# Patient Record
Sex: MALE | Race: WHITE | Employment: OTHER | URBAN - METROPOLITAN AREA
[De-identification: names, ages, dates, MRNs, and addresses within clinical notes are randomized per-mention and may not be internally consistent; named-entity substitution may affect disease eponyms.]

---

## 2017-12-06 ENCOUNTER — APPOINTMENT (OUTPATIENT)
Dept: GENERAL RADIOLOGY | Age: 47
End: 2017-12-06
Payer: COMMERCIAL

## 2017-12-06 ENCOUNTER — HOSPITAL ENCOUNTER (EMERGENCY)
Age: 47
Discharge: HOME | End: 2017-12-06
Attending: EMERGENCY MEDICINE
Payer: COMMERCIAL

## 2017-12-06 VITALS
SYSTOLIC BLOOD PRESSURE: 154 MMHG | WEIGHT: 281.2 LBS | BODY MASS INDEX: 37.27 KG/M2 | HEART RATE: 105 BPM | OXYGEN SATURATION: 95 % | DIASTOLIC BLOOD PRESSURE: 100 MMHG | RESPIRATION RATE: 20 BRPM | TEMPERATURE: 97.2 F | HEIGHT: 73 IN

## 2017-12-06 DIAGNOSIS — M77.9 TENDONITIS: Primary | ICD-10-CM

## 2017-12-06 LAB
ABSOLUTE EOS #: 0.2 K/UL (ref 0–0.4)
ABSOLUTE IMMATURE GRANULOCYTE: ABNORMAL K/UL (ref 0–0.3)
ABSOLUTE LYMPH #: 1.5 K/UL (ref 1–4.8)
ABSOLUTE MONO #: 0.8 K/UL (ref 0.2–0.8)
BASOPHILS # BLD: 2 % (ref 0–2)
BASOPHILS ABSOLUTE: 0.1 K/UL (ref 0–0.2)
DIFFERENTIAL TYPE: ABNORMAL
EOSINOPHILS RELATIVE PERCENT: 2 % (ref 1–4)
HCT VFR BLD CALC: 49.4 % (ref 41–53)
HEMOGLOBIN: 16.4 G/DL (ref 13.5–17.5)
IMMATURE GRANULOCYTES: ABNORMAL %
LYMPHOCYTES # BLD: 19 % (ref 24–44)
MCH RBC QN AUTO: 28.9 PG (ref 26–34)
MCHC RBC AUTO-ENTMCNC: 33.2 G/DL (ref 31–37)
MCV RBC AUTO: 87 FL (ref 80–100)
MONOCYTES # BLD: 10 % (ref 1–7)
PDW BLD-RTO: 13.1 % (ref 11.5–14.5)
PLATELET # BLD: 248 K/UL (ref 130–400)
PLATELET ESTIMATE: ABNORMAL
PMV BLD AUTO: 8.3 FL (ref 6–12)
RBC # BLD: 5.68 M/UL (ref 4.5–5.9)
RBC # BLD: ABNORMAL 10*6/UL
SEG NEUTROPHILS: 67 % (ref 36–66)
SEGMENTED NEUTROPHILS ABSOLUTE COUNT: 5.6 K/UL (ref 1.8–7.7)
URIC ACID: 7 MG/DL (ref 3.4–7)
WBC # BLD: 8.3 K/UL (ref 3.5–11)
WBC # BLD: ABNORMAL 10*3/UL

## 2017-12-06 PROCEDURE — 73630 X-RAY EXAM OF FOOT: CPT

## 2017-12-06 PROCEDURE — 84550 ASSAY OF BLOOD/URIC ACID: CPT

## 2017-12-06 PROCEDURE — 85025 COMPLETE CBC W/AUTO DIFF WBC: CPT

## 2017-12-06 PROCEDURE — 99283 EMERGENCY DEPT VISIT LOW MDM: CPT

## 2017-12-06 RX ORDER — IBUPROFEN 800 MG/1
800 TABLET ORAL EVERY 8 HOURS PRN
Qty: 30 TABLET | Refills: 0 | Status: SHIPPED | OUTPATIENT
Start: 2017-12-06 | End: 2018-10-18

## 2017-12-06 RX ORDER — HYDROCODONE BITARTRATE AND ACETAMINOPHEN 5; 325 MG/1; MG/1
1 TABLET ORAL EVERY 6 HOURS PRN
Qty: 10 TABLET | Refills: 0 | Status: SHIPPED | OUTPATIENT
Start: 2017-12-06 | End: 2017-12-13

## 2017-12-06 ASSESSMENT — PAIN DESCRIPTION - FREQUENCY: FREQUENCY: CONTINUOUS

## 2017-12-06 ASSESSMENT — PAIN DESCRIPTION - ORIENTATION: ORIENTATION: RIGHT

## 2017-12-06 ASSESSMENT — PAIN DESCRIPTION - LOCATION: LOCATION: FOOT

## 2017-12-06 ASSESSMENT — PAIN DESCRIPTION - DESCRIPTORS: DESCRIPTORS: THROBBING

## 2017-12-06 ASSESSMENT — PAIN SCALES - GENERAL: PAINLEVEL_OUTOF10: 10

## 2017-12-06 ASSESSMENT — PAIN SCALES - WONG BAKER: WONGBAKER_NUMERICALRESPONSE: 10

## 2017-12-07 ENCOUNTER — OFFICE VISIT (OUTPATIENT)
Dept: PODIATRY | Age: 47
End: 2017-12-07
Payer: COMMERCIAL

## 2017-12-07 VITALS — BODY MASS INDEX: 37.24 KG/M2 | HEIGHT: 73 IN | WEIGHT: 281 LBS

## 2017-12-07 DIAGNOSIS — M25.571 SINUS TARSI SYNDROME OF RIGHT FOOT: Primary | ICD-10-CM

## 2017-12-07 PROBLEM — G47.33 OBSTRUCTIVE SLEEP APNEA SYNDROME: Status: ACTIVE | Noted: 2017-06-09

## 2017-12-07 PROBLEM — M54.16 LUMBAR RADICULOPATHY: Status: ACTIVE | Noted: 2017-06-09

## 2017-12-07 PROBLEM — G25.81 RESTLESS LEGS SYNDROME: Status: ACTIVE | Noted: 2017-06-09

## 2017-12-07 PROBLEM — E11.9 DIABETES MELLITUS (HCC): Status: ACTIVE | Noted: 2017-06-09

## 2017-12-07 PROBLEM — I10 HYPERTENSION: Status: ACTIVE | Noted: 2017-06-09

## 2017-12-07 PROBLEM — E78.5 HYPERLIPIDEMIA: Status: ACTIVE | Noted: 2017-06-09

## 2017-12-07 PROCEDURE — 99203 OFFICE O/P NEW LOW 30 MIN: CPT | Performed by: PODIATRIST

## 2017-12-07 RX ORDER — ROPINIROLE 2 MG/1
2 TABLET, FILM COATED ORAL
COMMUNITY
Start: 2017-06-09 | End: 2018-10-18 | Stop reason: ALTCHOICE

## 2017-12-07 RX ORDER — ROSUVASTATIN CALCIUM 40 MG/1
40 TABLET, COATED ORAL
COMMUNITY
Start: 2017-06-09 | End: 2018-10-18

## 2017-12-07 RX ORDER — PREDNISONE 10 MG/1
TABLET ORAL
Qty: 20 TABLET | Refills: 0 | Status: SHIPPED | OUTPATIENT
Start: 2017-12-07 | End: 2018-10-18

## 2017-12-07 RX ORDER — HYDROCHLOROTHIAZIDE 25 MG/1
25 TABLET ORAL
COMMUNITY
Start: 2017-06-09 | End: 2018-10-18

## 2017-12-07 NOTE — PROGRESS NOTES
[facility]  New Patient History and Physical    Chief Complaint   Patient presents with    New Patient    Foot Pain     Calcium build up- Right Foot    Nail Problem     [place]         HPI: hCepe Turpin is a 49 y.o. male who presents to the office today complaining of possible calcium build and pain in bilateral feet. Symptoms began several month(s) ago. Patient relates pain is Present. Pain is rated 7 out of 10 and is described as constant, moderate, severe. Treatments prior to today's visit include: yes, previous seen [facility] ED. Currently denies F/C/N/V. Allergies   Allergen Reactions    Codeine     Ibuprofen        Past Medical History:   Diagnosis Date    Hyperlipidemia     Hypertension     Kidney calculi     Lumbar radiculopathy 6/9/2017    Obstructive sleep apnea syndrome 6/9/2017    Restless legs syndrome 6/9/2017       Prior to Admission medications    Medication Sig Start Date End Date Taking? Authorizing Provider   hydrochlorothiazide (HYDRODIURIL) 25 MG tablet Take 25 mg by mouth 6/9/17  Yes Historical Provider, MD   rosuvastatin (CRESTOR) 40 MG tablet Take 40 mg by mouth 6/9/17  Yes Historical Provider, MD   rOPINIRole (REQUIP) 2 MG tablet Take 2 mg by mouth 6/9/17  Yes Historical Provider, MD   ketorolac (TORADOL) 10 MG tablet Take 1 tablet by mouth every 6 hours as needed for Pain 7/10/15  Yes aRy Goodrich DO   ibuprofen (ADVIL;MOTRIN) 800 MG tablet Take 1 tablet by mouth every 8 hours as needed for Pain 12/6/17   Sarthak Neely CNP   HYDROcodone-acetaminophen (NORCO) 5-325 MG per tablet Take 1 tablet by mouth every 6 hours as needed for Pain . 12/6/17 12/13/17  Sarthak Neely CNP   tamsulosin (FLOMAX) 0.4 MG CAPS Take 0.4 mg by mouth daily for 5 doses 7/10/15 7/15/15  Donna Mcdonald DO       Past Surgical History:   Procedure Laterality Date    ANKLE SURGERY Right     SHOULDER SURGERY Right        History reviewed. No pertinent family history.     Social History Substance Use Topics    Smoking status: Former Smoker    Smokeless tobacco: Never Used    Alcohol use Yes      Comment: occasionally       Review of Systems    Lower Extremity Physical Examination:     Vitals: There were no vitals filed for this visit. General: AAO x 3 in NAD. Dermatologic Exam:  Skin lesion/ulceration Absent . Skin No rashes or nodules noted. .       Musculoskeletal:     1st MPJ ROM decreased, Bilateral.  Muscle strength 5/5, Bilateral.  Pain present upon palpation of left and right sinus tarsi area with pain elicited with eversion. .  Medial longitudinal arch, Bilateral WNL. Ankle ROM WNL,Bilateral.    Dorsally contracted digits absent digits 1-5 Bilateral.     Vascular: DP and PT pulses palpable 2/4, Bilateral.  CFT <3 seconds, Bilateral.  Hair growth present to the level of the digits, Bilateral.  Edema absent, Bilateral.  Varicosities absent, Bilateral. Erythema absent, Bilateral    Neurological: Sensation intact to light touch to level of digits, Bilateral.  Protective sensation intact 10/10 sites via 5.07/10g [place] Monofilament, Bilateral.  negative Tinel's, Bilateral.  negative Valleix sign, Bilateral.      Integument: Warm, dry, supple, Bilateral.  Open lesion absent, Bilateral.  Interdigital maceration absent to web spaces 1-4, Bilateral.  Nails are normal in length, thickness and color 1-5 bilateral.  Fissures absent, Bilateral.           Asessment: Patient is a 49 y.o. male with:   1. Sinus tarsi syndrome of right foot         Plan: Patient examined and evaluated. Current condition and treatment options discussed in detail. Discussed conservative and surgical options with the patient. rx given for prednisone to be tapered daily. Advised pt to obtain OTC orthotics and pt is understanding of that. Verbal and written instructions given to patient. Contact office with any questions/problems/concerns. RTC in 2week(s).     12/7/2017      Electronically signed by

## 2017-12-07 NOTE — ED PROVIDER NOTES
Attending Supervisory Note/Shared Visit   I have personally performed a face to face diagnostic evaluation on this patient. I have reviewed the mid-levels findings and agree.   History and Exam by me shows Foot pain      (Please note that portions of this note were completed with a voice recognition program.  Efforts were made to edit the dictations but occasionally words are mis-transcribed.)    Phuong Gonzalez MD  Attending Emergency Physician        Phuong Gonzalez MD  12/06/17 2173

## 2017-12-08 ASSESSMENT — ENCOUNTER SYMPTOMS
DIARRHEA: 0
CONSTIPATION: 0
COUGH: 0
SINUS PRESSURE: 0
SHORTNESS OF BREATH: 0
WHEEZING: 0
ABDOMINAL PAIN: 0
RHINORRHEA: 0
VOMITING: 0
COLOR CHANGE: 0
SORE THROAT: 0
NAUSEA: 0

## 2017-12-08 NOTE — ED PROVIDER NOTES
[address] [facility] ED  eMERGENCY dEPARTMENT eNCOUnter      Pt Name: Darin Sinha  MRN: [id number]  [place] 1970  Date of evaluation: 12/6/2017  Provider: Abiel Romero NP, Rehan 7631       Chief Complaint   Patient presents with    Foot Swelling     right    Foot Pain     right         HISTORY OF PRESENT ILLNESS  (Location/Symptom, Timing/Onset, Context/Setting, Quality, Duration, Modifying Factors, Severity.)   Darin Sinha is a 49 y.o. male who presents to the emergency department Today by private vehicle for evaluation of pain and swelling to the right lateral foot. Patient states that the last month he's been having pain and swelling to the right lateral foot. The pain has gotten Progressively worse. The pain is worse when he tries to put weight on it. He rates the pain a 10 on a 0-10 scale. He denies any known injury, fall, or trauma. Nursing Notes were reviewed. ALLERGIES     Codeine and Ibuprofen    CURRENT MEDICATIONS       Discharge Medication List as of 12/6/2017 10:19 PM      CONTINUE these medications which have NOT CHANGED    Details   tamsulosin (FLOMAX) 0.4 MG CAPS Take 0.4 mg by mouth daily for 5 doses, Disp-5 capsule, R-0      ketorolac (TORADOL) 10 MG tablet Take 1 tablet by mouth every 6 hours as needed for Pain, Disp-20 tablet, R-3             PAST MEDICAL HISTORY         Diagnosis Date    Hyperlipidemia     Hypertension     Kidney calculi     Lumbar radiculopathy 6/9/2017    Obstructive sleep apnea syndrome 6/9/2017    Restless legs syndrome 6/9/2017       SURGICAL HISTORY           Procedure Laterality Date    ANKLE SURGERY Right     SHOULDER SURGERY Right          FAMILY HISTORY     History reviewed. No pertinent family history. No family status information on file. SOCIAL HISTORY      reports that he has quit smoking. He has never used smokeless tobacco. He reports that he drinks alcohol. He reports that he does not use drugs.     REVIEW OF SYSTEMS    (2-9 systems for level 4, 10 or more for level 5)     Review of Systems   Constitutional: Negative for chills, fever and unexpected weight change. HENT: Negative for congestion, rhinorrhea, sinus pressure and sore throat. Respiratory: Negative for cough, shortness of breath and wheezing. Cardiovascular: Negative for chest pain and palpitations. Gastrointestinal: Negative for abdominal pain, constipation, diarrhea, nausea and vomiting. Genitourinary: Negative for dysuria and hematuria. Musculoskeletal: Negative for arthralgias and myalgias. Right foot pain   Skin: Negative for color change and rash. Neurological: Negative for dizziness, weakness and headaches. Hematological: Negative for adenopathy. Except as noted above the remainder of the review of systems was reviewed and negative. PHYSICAL EXAM    (up to 7 for level 4, 8 or more for level 5)     ED Triage Vitals [12/06/17 2120]   BP Temp Temp Source Pulse Resp SpO2 Height Weight   (!) 154/100 97.2 °F (36.2 °C) Oral 121 20 95 % 6' 1\" (1.854 m) 281 lb 3.2 oz (127.6 kg)       Physical Exam   Constitutional: He is oriented to person, place, and time. He appears well-developed and well-nourished. HENT:   Head: Normocephalic and atraumatic. Mouth/Throat: Oropharynx is clear and moist.   Eyes: Conjunctivae are normal. Pupils are equal, round, and reactive to light. Neck: Normal range of motion. Neck supple. Cardiovascular: Normal rate and regular rhythm. Pulmonary/Chest: Effort normal and breath sounds normal. No stridor. No respiratory distress. Abdominal: Soft. Bowel sounds are normal.   Musculoskeletal: Normal range of motion. Feet:    Lymphadenopathy:     He has no cervical adenopathy. Neurological: He is alert and oriented to person, place, and time. Skin: Skin is warm and dry. No rash noted. Psychiatric: He has a normal mood and affect. Vitals reviewed.         DIAGNOSTIC RESULTS RADIOLOGY:   Non-plain film images such as CT, Ultrasound and MRI are read by the radiologist. Martine Fields radiographic images are visualized and preliminarily interpreted by the emergency physician with the below findings:    Xr Foot Right (min 3 Views)    Result Date: 12/6/2017  EXAMINATION: 3 VIEWS OF THE RIGHT FOOT 12/6/2017 9:46 pm COMPARISON: None HISTORY: ORDERING SYSTEM PROVIDED HISTORY: Pain TECHNOLOGIST PROVIDED HISTORY: Reason for exam:->Pain Ordering Physician Provided Reason for Exam: right foot pain Acuity: Chronic Type of Exam: Initial FINDINGS: The examination demonstrated a small exostosis at the base of the 1st distal phalanx. Mild hypertrophy of the 1st metatarsal head present with early osteoarthritic change at 1st metatarsophalangeal joint. There is a well corticated bone density along the lateral aspect of the cuboid likely representing ossicle within the peroneus tendon versus old avulsed bone fragment. Bony cortices are otherwise smooth alignment and joint spaces are maintained. There are mild osteoarthritic changes present at distal interphalangeal joint. Nonsegmentation of the distal 5th phalanges noted. A well corticated bone density along the lateral aspect of the cuboid could potentially represent ossicle within the tendon versus old avulsed bone fragment. Mild hyperplasia/bunion 1st metatarsal head with mild osteoarthritic change at 1st metatarsophalangeal joint. No evidence of acute fracture or dislocation. Interpretation per the Radiologist below, if available at the time of this note:    XR FOOT RIGHT (MIN 3 VIEWS)   Final Result   A well corticated bone density along the lateral aspect of the cuboid could   potentially represent ossicle within the tendon versus old avulsed bone   fragment. Mild hyperplasia/bunion 1st metatarsal head with mild osteoarthritic change   at 1st metatarsophalangeal joint. No evidence of acute fracture or dislocation. LABS:  Labs Reviewed   CBC WITH AUTO DIFFERENTIAL - Abnormal; Notable for the following:        Result Value    Seg Neutrophils 67 (*)     Lymphocytes 19 (*)     Monocytes 10 (*)     All other components within normal limits   URIC ACID       All other labs were within normal range or not returned as of this dictation. EMERGENCY DEPARTMENT COURSE and DIFFERENTIAL DIAGNOSIS/MDM:   Vitals:    Vitals:    12/06/17 2120 12/06/17 2129   BP: (!) 154/100    Pulse: 121 105   Resp: 20    Temp: 97.2 °F (36.2 °C)    TempSrc: Oral    SpO2: 95%    Weight: 281 lb 3.2 oz (127.6 kg)    Height: 6' 1\" (1.854 m)        Medical Decision Making: Patient was placed in a postop shoe Patient checked by me and found to be appropriate and patient's neurovascular intact. He'll be given podiatry follow-up and some Norco for pain. FINAL IMPRESSION      1.  Tendonitis          DISPOSITION/PLAN   DISPOSITION Decision to Discharge    PATIENT REFERRED TO:   Samir Ferreira, ROBERT  [address]  [phone]    Call in 2 days      [facility] ED  [address] [facility]  [phone]    If symptoms worsen      DISCHARGE MEDICATIONS:     Discharge Medication List as of 12/6/2017 10:19 PM      START taking these medications    Details   ibuprofen (ADVIL;MOTRIN) 800 MG tablet Take 1 tablet by mouth every 8 hours as needed for Pain, Disp-30 tablet, R-0Print      HYDROcodone-acetaminophen (NORCO) 5-325 MG per tablet Take 1 tablet by mouth every 6 hours as needed for Pain ., Disp-10 tablet, R-0Print                 (Please note that portions of this note were completed with a voice recognition program.  Efforts were made to edit the dictations but occasionally words are mis-transcribed.)    [address] [facility] NP, CNP  Certified Nurse Practitioner           Malissa Asher, [facility]  12/08/17 9101

## 2017-12-12 ENCOUNTER — OFFICE VISIT (OUTPATIENT)
Dept: PODIATRY | Age: 47
End: 2017-12-12
Payer: COMMERCIAL

## 2017-12-12 VITALS
DIASTOLIC BLOOD PRESSURE: 77 MMHG | WEIGHT: 281 LBS | RESPIRATION RATE: 18 BRPM | SYSTOLIC BLOOD PRESSURE: 130 MMHG | BODY MASS INDEX: 37.24 KG/M2 | HEIGHT: 73 IN | HEART RATE: 83 BPM

## 2017-12-12 DIAGNOSIS — M25.571 SINUS TARSI SYNDROME OF RIGHT ANKLE: ICD-10-CM

## 2017-12-12 DIAGNOSIS — R60.0 EDEMA OF LOWER EXTREMITY: Primary | ICD-10-CM

## 2017-12-12 DIAGNOSIS — M79.604 LOWER LIMB PAIN, INFERIOR, RIGHT: ICD-10-CM

## 2017-12-12 PROCEDURE — 99213 OFFICE O/P EST LOW 20 MIN: CPT | Performed by: PODIATRIST

## 2017-12-19 ENCOUNTER — OFFICE VISIT (OUTPATIENT)
Dept: PODIATRY | Age: 47
End: 2017-12-19
Payer: COMMERCIAL

## 2017-12-19 VITALS
TEMPERATURE: 98.2 F | DIASTOLIC BLOOD PRESSURE: 78 MMHG | HEART RATE: 72 BPM | WEIGHT: 283 LBS | SYSTOLIC BLOOD PRESSURE: 128 MMHG | RESPIRATION RATE: 16 BRPM | BODY MASS INDEX: 34.46 KG/M2 | HEIGHT: 76 IN

## 2017-12-19 DIAGNOSIS — E13.49 OTHER DIABETIC NEUROLOGICAL COMPLICATION ASSOCIATED WITH OTHER SPECIFIED DIABETES MELLITUS (HCC): Primary | ICD-10-CM

## 2017-12-19 DIAGNOSIS — M79.604 LOWER LIMB PAIN, INFERIOR, RIGHT: ICD-10-CM

## 2017-12-19 DIAGNOSIS — G60.3 IDIOPATHIC PROGRESSIVE POLYNEUROPATHY: ICD-10-CM

## 2017-12-19 PROCEDURE — 99213 OFFICE O/P EST LOW 20 MIN: CPT | Performed by: PODIATRIST

## 2018-02-02 ENCOUNTER — HOSPITAL ENCOUNTER (EMERGENCY)
Age: 48
Discharge: HOME | End: 2018-02-02
Attending: EMERGENCY MEDICINE
Payer: COMMERCIAL

## 2018-02-02 VITALS
SYSTOLIC BLOOD PRESSURE: 146 MMHG | HEART RATE: 116 BPM | WEIGHT: 276 LBS | OXYGEN SATURATION: 96 % | TEMPERATURE: 98.1 F | RESPIRATION RATE: 16 BRPM | BODY MASS INDEX: 36.58 KG/M2 | HEIGHT: 73 IN | DIASTOLIC BLOOD PRESSURE: 83 MMHG

## 2018-02-02 DIAGNOSIS — L02.91 ABSCESS: Primary | ICD-10-CM

## 2018-02-02 LAB
ABSOLUTE EOS #: 0.1 K/UL (ref 0–0.4)
ABSOLUTE IMMATURE GRANULOCYTE: ABNORMAL K/UL (ref 0–0.3)
ABSOLUTE LYMPH #: 1.1 K/UL (ref 1–4.8)
ABSOLUTE MONO #: 0.8 K/UL (ref 0.2–0.8)
ANION GAP SERPL CALCULATED.3IONS-SCNC: 16 MMOL/L (ref 9–17)
BASOPHILS # BLD: 0 % (ref 0–2)
BASOPHILS ABSOLUTE: 0 K/UL (ref 0–0.2)
BUN BLDV-MCNC: 15 MG/DL (ref 6–20)
BUN/CREAT BLD: 20 (ref 9–20)
CALCIUM SERPL-MCNC: 9.4 MG/DL (ref 8.6–10.4)
CHLORIDE BLD-SCNC: 94 MMOL/L (ref 98–107)
CO2: 25 MMOL/L (ref 20–31)
CREAT SERPL-MCNC: 0.76 MG/DL (ref 0.7–1.2)
DIFFERENTIAL TYPE: ABNORMAL
EOSINOPHILS RELATIVE PERCENT: 1 % (ref 1–4)
GFR AFRICAN AMERICAN: >60 ML/MIN
GFR NON-AFRICAN AMERICAN: >60 ML/MIN
GFR SERPL CREATININE-BSD FRML MDRD: ABNORMAL ML/MIN/{1.73_M2}
GFR SERPL CREATININE-BSD FRML MDRD: ABNORMAL ML/MIN/{1.73_M2}
GLUCOSE BLD-MCNC: 360 MG/DL (ref 70–99)
HCT VFR BLD CALC: 50.1 % (ref 41–53)
HEMOGLOBIN: 16.8 G/DL (ref 13.5–17.5)
IMMATURE GRANULOCYTES: ABNORMAL %
LYMPHOCYTES # BLD: 12 % (ref 24–44)
MCH RBC QN AUTO: 28.6 PG (ref 26–34)
MCHC RBC AUTO-ENTMCNC: 33.5 G/DL (ref 31–37)
MCV RBC AUTO: 85.2 FL (ref 80–100)
MONOCYTES # BLD: 9 % (ref 1–7)
NRBC AUTOMATED: ABNORMAL PER 100 WBC
PDW BLD-RTO: 12.4 % (ref 11.5–14.5)
PLATELET # BLD: 228 K/UL (ref 130–400)
PLATELET ESTIMATE: ABNORMAL
PMV BLD AUTO: ABNORMAL FL (ref 6–12)
POTASSIUM SERPL-SCNC: 3.9 MMOL/L (ref 3.7–5.3)
RBC # BLD: 5.88 M/UL (ref 4.5–5.9)
RBC # BLD: ABNORMAL 10*6/UL
SEG NEUTROPHILS: 78 % (ref 36–66)
SEGMENTED NEUTROPHILS ABSOLUTE COUNT: 6.7 K/UL (ref 1.8–7.7)
SODIUM BLD-SCNC: 135 MMOL/L (ref 135–144)
WBC # BLD: 8.7 K/UL (ref 3.5–11)
WBC # BLD: ABNORMAL 10*3/UL

## 2018-02-02 PROCEDURE — 96375 TX/PRO/DX INJ NEW DRUG ADDON: CPT

## 2018-02-02 PROCEDURE — 96365 THER/PROPH/DIAG IV INF INIT: CPT

## 2018-02-02 PROCEDURE — 2580000003 HC RX 258: Performed by: EMERGENCY MEDICINE

## 2018-02-02 PROCEDURE — 87070 CULTURE OTHR SPECIMN AEROBIC: CPT

## 2018-02-02 PROCEDURE — 6360000002 HC RX W HCPCS: Performed by: EMERGENCY MEDICINE

## 2018-02-02 PROCEDURE — 87205 SMEAR GRAM STAIN: CPT

## 2018-02-02 PROCEDURE — 85025 COMPLETE CBC W/AUTO DIFF WBC: CPT

## 2018-02-02 PROCEDURE — 80048 BASIC METABOLIC PNL TOTAL CA: CPT

## 2018-02-02 PROCEDURE — 99283 EMERGENCY DEPT VISIT LOW MDM: CPT

## 2018-02-02 PROCEDURE — 96367 TX/PROPH/DG ADDL SEQ IV INF: CPT

## 2018-02-02 RX ORDER — DOXYCYCLINE 100 MG/1
100 TABLET ORAL 2 TIMES DAILY
Qty: 20 TABLET | Refills: 0 | Status: SHIPPED | OUTPATIENT
Start: 2018-02-02 | End: 2018-02-12

## 2018-02-02 RX ORDER — CEPHALEXIN 500 MG/1
500 CAPSULE ORAL 3 TIMES DAILY
Qty: 30 CAPSULE | Refills: 0 | Status: SHIPPED | OUTPATIENT
Start: 2018-02-02 | End: 2018-10-18

## 2018-02-02 RX ORDER — ONDANSETRON 2 MG/ML
4 INJECTION INTRAMUSCULAR; INTRAVENOUS ONCE
Status: COMPLETED | OUTPATIENT
Start: 2018-02-02 | End: 2018-02-02

## 2018-02-02 RX ORDER — 0.9 % SODIUM CHLORIDE 0.9 %
1000 INTRAVENOUS SOLUTION INTRAVENOUS ONCE
Status: COMPLETED | OUTPATIENT
Start: 2018-02-02 | End: 2018-02-02

## 2018-02-02 RX ORDER — OXYCODONE HYDROCHLORIDE AND ACETAMINOPHEN 5; 325 MG/1; MG/1
1 TABLET ORAL 3 TIMES DAILY PRN
Qty: 20 TABLET | Refills: 0 | Status: SHIPPED | OUTPATIENT
Start: 2018-02-02 | End: 2018-02-09

## 2018-02-02 RX ADMIN — HYDROMORPHONE HYDROCHLORIDE 1 MG: 1 INJECTION, SOLUTION INTRAMUSCULAR; INTRAVENOUS; SUBCUTANEOUS at 12:41

## 2018-02-02 RX ADMIN — CEFTRIAXONE 2 G: 2 INJECTION, POWDER, FOR SOLUTION INTRAMUSCULAR; INTRAVENOUS at 15:31

## 2018-02-02 RX ADMIN — SODIUM CHLORIDE 1000 ML: 9 INJECTION, SOLUTION INTRAVENOUS at 12:31

## 2018-02-02 RX ADMIN — DEXTROSE MONOHYDRATE 1750 MG: 50 INJECTION, SOLUTION INTRAVENOUS at 13:04

## 2018-02-02 RX ADMIN — ONDANSETRON 4 MG: 2 INJECTION INTRAMUSCULAR; INTRAVENOUS at 12:39

## 2018-02-02 ASSESSMENT — PAIN DESCRIPTION - ORIENTATION: ORIENTATION: RIGHT

## 2018-02-02 ASSESSMENT — PAIN DESCRIPTION - PROGRESSION: CLINICAL_PROGRESSION: GRADUALLY IMPROVING

## 2018-02-02 ASSESSMENT — PAIN DESCRIPTION - LOCATION: LOCATION: GROIN

## 2018-02-02 ASSESSMENT — PAIN DESCRIPTION - DESCRIPTORS: DESCRIPTORS: PRESSURE;THROBBING

## 2018-02-02 ASSESSMENT — PAIN SCALES - GENERAL
PAINLEVEL_OUTOF10: 7
PAINLEVEL_OUTOF10: 9

## 2018-02-02 ASSESSMENT — PAIN DESCRIPTION - PAIN TYPE: TYPE: ACUTE PAIN

## 2018-02-04 LAB
CULTURE: ABNORMAL
CULTURE: ABNORMAL
DIRECT EXAM: ABNORMAL
DIRECT EXAM: ABNORMAL
Lab: ABNORMAL
SPECIMEN DESCRIPTION: ABNORMAL
STATUS: ABNORMAL

## 2018-03-21 ENCOUNTER — HOSPITAL ENCOUNTER (EMERGENCY)
Age: 48
Discharge: HOME | End: 2018-03-21
Attending: EMERGENCY MEDICINE
Payer: COMMERCIAL

## 2018-03-21 VITALS
SYSTOLIC BLOOD PRESSURE: 105 MMHG | WEIGHT: 274.31 LBS | HEART RATE: 124 BPM | TEMPERATURE: 97.8 F | DIASTOLIC BLOOD PRESSURE: 74 MMHG | OXYGEN SATURATION: 96 % | BODY MASS INDEX: 36.35 KG/M2 | RESPIRATION RATE: 18 BRPM | HEIGHT: 73 IN

## 2018-03-21 DIAGNOSIS — T23.261A PARTIAL THICKNESS BURN OF BACK OF RIGHT HAND, INITIAL ENCOUNTER: Primary | ICD-10-CM

## 2018-03-21 PROCEDURE — 99282 EMERGENCY DEPT VISIT SF MDM: CPT

## 2018-03-21 PROCEDURE — 6370000000 HC RX 637 (ALT 250 FOR IP): Performed by: NURSE PRACTITIONER

## 2018-03-21 PROCEDURE — 2500000003 HC RX 250 WO HCPCS: Performed by: NURSE PRACTITIONER

## 2018-03-21 RX ORDER — HYDROCODONE BITARTRATE AND ACETAMINOPHEN 5; 325 MG/1; MG/1
1 TABLET ORAL EVERY 8 HOURS PRN
Qty: 15 TABLET | Refills: 0 | Status: SHIPPED | OUTPATIENT
Start: 2018-03-21 | End: 2018-03-26

## 2018-03-21 RX ORDER — HYDROCODONE BITARTRATE AND ACETAMINOPHEN 5; 325 MG/1; MG/1
2 TABLET ORAL ONCE
Status: COMPLETED | OUTPATIENT
Start: 2018-03-21 | End: 2018-03-21

## 2018-03-21 RX ORDER — GABAPENTIN 300 MG/1
300 CAPSULE ORAL 3 TIMES DAILY
COMMUNITY
End: 2018-10-18

## 2018-03-21 RX ADMIN — HYDROCODONE BITARTRATE AND ACETAMINOPHEN 2 TABLET: 5; 325 TABLET ORAL at 20:53

## 2018-03-21 RX ADMIN — SILVER SULFADIAZINE: 10 CREAM TOPICAL at 20:54

## 2018-03-21 ASSESSMENT — PAIN DESCRIPTION - LOCATION: LOCATION: HAND

## 2018-03-21 ASSESSMENT — PAIN DESCRIPTION - ORIENTATION: ORIENTATION: RIGHT

## 2018-03-21 ASSESSMENT — ENCOUNTER SYMPTOMS
VOMITING: 0
SORE THROAT: 0
SHORTNESS OF BREATH: 0
CONSTIPATION: 0
WHEEZING: 0
NAUSEA: 0
COLOR CHANGE: 0
ABDOMINAL PAIN: 0
COUGH: 0
RHINORRHEA: 0
DIARRHEA: 0
SINUS PRESSURE: 0

## 2018-03-21 ASSESSMENT — PAIN DESCRIPTION - PAIN TYPE: TYPE: ACUTE PAIN

## 2018-03-21 ASSESSMENT — PAIN SCALES - GENERAL: PAINLEVEL_OUTOF10: 10

## 2018-03-21 ASSESSMENT — PAIN DESCRIPTION - DESCRIPTORS: DESCRIPTORS: BURNING
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
## 2018-06-11 ENCOUNTER — OFFICE VISIT (OUTPATIENT)
Dept: PODIATRY | Age: 48
End: 2018-06-11
Payer: COMMERCIAL

## 2018-06-11 VITALS — BODY MASS INDEX: 36.31 KG/M2 | HEIGHT: 73 IN | HEART RATE: 68 BPM | WEIGHT: 274 LBS | RESPIRATION RATE: 16 BRPM

## 2018-06-11 DIAGNOSIS — B35.1 DERMATOPHYTOSIS OF NAIL: Primary | ICD-10-CM

## 2018-06-11 DIAGNOSIS — M79.605 PAIN IN BOTH LOWER EXTREMITIES: ICD-10-CM

## 2018-06-11 DIAGNOSIS — E11.42 DIABETIC POLYNEUROPATHY ASSOCIATED WITH TYPE 2 DIABETES MELLITUS (HCC): ICD-10-CM

## 2018-06-11 DIAGNOSIS — B35.1 DERMATOPHYTOSIS OF NAIL: ICD-10-CM

## 2018-06-11 DIAGNOSIS — M79.604 PAIN IN BOTH LOWER EXTREMITIES: ICD-10-CM

## 2018-06-11 DIAGNOSIS — E11.42 DIABETIC POLYNEUROPATHY ASSOCIATED WITH TYPE 2 DIABETES MELLITUS (HCC): Primary | ICD-10-CM

## 2018-06-11 DIAGNOSIS — R26.2 DIFFICULTY WALKING: ICD-10-CM

## 2018-06-11 PROCEDURE — 11721 DEBRIDE NAIL 6 OR MORE: CPT | Performed by: PODIATRIST

## 2018-06-11 RX ORDER — PREGABALIN 50 MG/1
50 CAPSULE ORAL 3 TIMES DAILY
Qty: 90 CAPSULE | Refills: 3 | Status: SHIPPED | OUTPATIENT
Start: 2018-06-11 | End: 2018-10-18

## 2018-06-15 ENCOUNTER — TELEPHONE (OUTPATIENT)
Dept: PODIATRY | Age: 48
End: 2018-06-15

## 2018-08-06 ENCOUNTER — OFFICE VISIT (OUTPATIENT)
Dept: PODIATRY | Age: 48
End: 2018-08-06
Payer: MEDICARE

## 2018-08-06 VITALS — WEIGHT: 274 LBS | BODY MASS INDEX: 36.31 KG/M2 | HEIGHT: 73 IN

## 2018-08-06 DIAGNOSIS — R26.2 DIFFICULTY WALKING: ICD-10-CM

## 2018-08-06 DIAGNOSIS — M79.675 PAIN OF TOES OF BOTH FEET: ICD-10-CM

## 2018-08-06 DIAGNOSIS — E11.42 DIABETIC POLYNEUROPATHY ASSOCIATED WITH TYPE 2 DIABETES MELLITUS (HCC): Primary | ICD-10-CM

## 2018-08-06 DIAGNOSIS — B35.1 DERMATOPHYTOSIS OF NAIL: ICD-10-CM

## 2018-08-06 DIAGNOSIS — M79.674 PAIN OF TOES OF BOTH FEET: ICD-10-CM

## 2018-08-06 DIAGNOSIS — L60.0 INGROWN NAIL: ICD-10-CM

## 2018-08-06 PROBLEM — E66.01 SEVERE OBESITY (BMI 35.0-39.9) WITH COMORBIDITY (HCC): Status: ACTIVE | Noted: 2018-07-31

## 2018-08-06 PROCEDURE — 4004F PT TOBACCO SCREEN RCVD TLK: CPT | Performed by: PODIATRIST

## 2018-08-06 PROCEDURE — G8427 DOCREV CUR MEDS BY ELIG CLIN: HCPCS | Performed by: PODIATRIST

## 2018-08-06 PROCEDURE — 3046F HEMOGLOBIN A1C LEVEL >9.0%: CPT | Performed by: PODIATRIST

## 2018-08-06 PROCEDURE — 2022F DILAT RTA XM EVC RTNOPTHY: CPT | Performed by: PODIATRIST

## 2018-08-06 PROCEDURE — 11721 DEBRIDE NAIL 6 OR MORE: CPT | Performed by: PODIATRIST

## 2018-08-06 PROCEDURE — G8417 CALC BMI ABV UP PARAM F/U: HCPCS | Performed by: PODIATRIST

## 2018-08-06 PROCEDURE — 11750 EXCISION NAIL&NAIL MATRIX: CPT | Performed by: PODIATRIST

## 2018-08-06 NOTE — PROGRESS NOTES
rosuvastatin (CRESTOR) 40 MG tablet Take 40 mg by mouth      rOPINIRole (REQUIP) 2 MG tablet Take 2 mg by mouth      predniSONE (DELTASONE) 10 MG tablet Take one pill 3 times a day x 3 days  Take one pill 2 times a day x 3 days  Take one pill 1 time a day x 3 days  Take 1/2 pill 1 time a day x 4 days 20 tablet 0    ibuprofen (ADVIL;MOTRIN) 800 MG tablet Take 1 tablet by mouth every 8 hours as needed for Pain 30 tablet 0    ketorolac (TORADOL) 10 MG tablet Take 1 tablet by mouth every 6 hours as needed for Pain 20 tablet 3    tamsulosin (FLOMAX) 0.4 MG CAPS Take 0.4 mg by mouth daily for 5 doses 5 capsule 0     No current facility-administered medications on file prior to visit. Review of Systems  Review of Systems - History obtained from chart review and the patient  General ROS: negative for - chills, fatigue, fever, night sweats or weight gain  Constitutional: Negative for chills, diaphoresis, fatigue, fever and unexpected weight change. Musculoskeletal: Positive for arthralgias, gait problem and joint swelling. Neurological ROS: negative for - behavioral changes, confusion, headaches or seizures. Negative for weakness and numbness. Dermatological ROS: negative for - mole changes, rash  Cardiovascular: Negative for leg swelling. Gastrointestinal: Negative for constipation, diarrhea, nausea and vomiting. Objective:  General: AAO x 3 in NAD.     Derm  Toenail Description  Sites of Onychomycosis Involvement (Check affected area)  [x] [x] [x] [x] [x] [x] [x] [x] [x] [x]  5 4 3 2 1 1 2 3 4 5                          Right                                        Left    Thickness  [x] [x] [x] [x] [x] [x] [x] [x] [x] [x]  5 4 3 2 1 1 2 3 4 5                         Right                                        Left    Dystrophic Changes   [x] [x] [x] [x] [x] [x] [x] [x] [x] [x]  5 4 3 2 1 1 2 3 4 5                         Right                                        Left    Color [x] [x] [x] [x] [x] [x] [x] [x] [x] [x]  5 4 3 2 1 1 2 3 4 5                          Right                                        Left    Incurvation/Ingrowin   [] [] [] [] [] [] [] [] [] []  5 4 3 2 1 1 2 3 4 5                         Right                                        Left    Inflammation/Pain   [x] [x] [x] [x] [x] [x] [x] [x] [x] [x]  5 4 3 2 1 1 2 3 4 5                         Right                                        Left      Dermatologic Exam:  Skin lesion/ulceration Absent . Skin No rashes or nodules noted. .       Musculoskeletal:     1st MPJ ROM decreased, Bilateral.  Muscle strength 5/5, Bilateral.  Pain present upon palpation of toenails 1-5, Bilateral. decreased medial longitudinal arch, Bilateral.  Ankle ROM decreased,Bilateral.    Dorsally contracted digits present digits 2, Bilateral.     Vascular: DP and PT pulses palpable 1/4, Bilateral.  CFT <5 seconds, Bilateral.  Hair growth absent to the level of the digits, Bilateral.  Edema present, Bilateral.  Varicosities absent, Bilateral. Erythema absent, Bilateral    Neurological: Sensation diminshed to light touch to level of digits, Bilateral.  Protective sensation intact 6/10 sites via 5.07/10g [place] Monofilament, Bilateral.  negative Tinel's, Bilateral.  negative Valleix sign, Bilateral.      Integument: Warm, dry, supple, Bilateral.  Open lesion absent, Bilateral.  Interdigital maceration absent to web spaces 4, Bilateral.  Nails 1-5 left and 1-5 right thickened > 3.0 mm, dystrophic and crumbly, discolored with subungual debris.   Fissures absent, Bilateral.     Visual inspection:  Deformity: hammertoe deformity digna feet  amputation: absent  Skin lesions: absent  Edema: right- 2+ pitting edema, left- 2+ pitting edema    Sensory exam:  Monofilament sensation: abnormal - 6/10 via SW 5.07/10g monofilament to the plantar foot bilateral feet    Pulses: abnormal - 1/4 dorsalis pedis pulse and 1/4 Posterior tibial pulse, pain. Post-operative chemical matrixectomy instruction sheet dispensed to patient. Pt will follow up in 1 weeks or sooner if any problems arise. Diagnosis was discussed with the pt and all of their questions were answered in detail. Proper foot hygiene and care was discussed with the pt. Informed patient on proper diabetic foot care and importance of tight glycemic control. Patient to check feet daily and contact the office with any questions/problems/concerns.    Other comorbidity noted and will be managed by PCP.  8/6/2018    Electronically signed by Malou Byrne DPM on 8/6/2018 at 2:28 PM  8/6/2018

## 2018-08-13 ENCOUNTER — OFFICE VISIT (OUTPATIENT)
Dept: PODIATRY | Age: 48
End: 2018-08-13

## 2018-08-13 VITALS — BODY MASS INDEX: 36.31 KG/M2 | WEIGHT: 274 LBS | RESPIRATION RATE: 16 BRPM | HEIGHT: 73 IN

## 2018-08-13 DIAGNOSIS — B35.1 DERMATOPHYTOSIS OF NAIL: Primary | ICD-10-CM

## 2018-08-13 DIAGNOSIS — I73.9 PVD (PERIPHERAL VASCULAR DISEASE) (HCC): ICD-10-CM

## 2018-08-13 DIAGNOSIS — E11.9 DIABETES MELLITUS WITHOUT COMPLICATION (HCC): ICD-10-CM

## 2018-08-13 DIAGNOSIS — M79.672 BILATERAL FOOT PAIN: ICD-10-CM

## 2018-08-13 DIAGNOSIS — Z98.890 POST-OPERATIVE STATE: ICD-10-CM

## 2018-08-13 DIAGNOSIS — M79.671 BILATERAL FOOT PAIN: ICD-10-CM

## 2018-08-13 PROCEDURE — 99024 POSTOP FOLLOW-UP VISIT: CPT | Performed by: PODIATRIST

## 2018-08-13 NOTE — PROGRESS NOTES
40 MG tablet Take 40 mg by mouth 6/9/17   Historical Provider, MD   rOPINIRole (REQUIP) 2 MG tablet Take 2 mg by mouth 6/9/17   Historical Provider, MD   predniSONE (DELTASONE) 10 MG tablet Take one pill 3 times a day x 3 days  Take one pill 2 times a day x 3 days  Take one pill 1 time a day x 3 days  Take 1/2 pill 1 time a day x 4 days 12/7/17   Kyle Melendez DPM   ibuprofen (ADVIL;MOTRIN) 800 MG tablet Take 1 tablet by mouth every 8 hours as needed for Pain 12/6/17   BRENNON Edmond - CNP   tamsulosin (FLOMAX) 0.4 MG CAPS Take 0.4 mg by mouth daily for 5 doses 7/10/15 7/15/15  Ray Goodrich DO   ketorolac (TORADOL) 10 MG tablet Take 1 tablet by mouth every 6 hours as needed for Pain 7/10/15   Jose Elias Black DO       Review of Systems  Review of Systems - History obtained from chart review and the patient  General ROS: negative for - chills, fatigue, fever, night sweats or weight gain  Constitutional: Negative for chills, diaphoresis, fatigue, fever and unexpected weight change. Musculoskeletal: Positive for arthralgias, gait problem and joint swelling. Neurological ROS: negative for - behavioral changes, confusion, headaches or seizures. Negative for weakness and numbness. Dermatological ROS: negative for - mole changes, rash  Cardiovascular: Negative for leg swelling. Gastrointestinal: Negative for constipation, diarrhea, nausea and vomiting. Lower Extremity Physical Examination:     Vitals:   Vitals:    08/13/18 0942   Resp: 16     General: AAO x 3 in NAD. Dermatologic Exam:  Skin lesion/ulceration Absent . Skin No rashes or nodules noted. .       Musculoskeletal:     1st MPJ ROM decreased, Bilateral.  Muscle strength 5/5, Bilateral.  Pain present upon palpation of toenails 1-5, Bilateral. decreased medial longitudinal arch, Bilateral.  Ankle ROM decreased,Bilateral.    Dorsally contracted digits present digits 2, Bilateral.     Vascular: DP and PT pulses palpable 1/4, Bilateral.

## 2018-08-27 ENCOUNTER — PROCEDURE VISIT (OUTPATIENT)
Dept: PODIATRY | Age: 48
End: 2018-08-27
Payer: MEDICARE

## 2018-08-27 VITALS — WEIGHT: 274 LBS | HEIGHT: 73 IN | RESPIRATION RATE: 16 BRPM | BODY MASS INDEX: 36.31 KG/M2

## 2018-08-27 DIAGNOSIS — B35.1 DERMATOPHYTOSIS OF NAIL: ICD-10-CM

## 2018-08-27 DIAGNOSIS — E11.51 TYPE II DIABETES MELLITUS WITH PERIPHERAL CIRCULATORY DISORDER (HCC): Primary | ICD-10-CM

## 2018-08-27 DIAGNOSIS — R60.0 EDEMA OF LOWER EXTREMITY: ICD-10-CM

## 2018-08-27 DIAGNOSIS — L60.0 INGROWN NAIL OF GREAT TOE OF LEFT FOOT: ICD-10-CM

## 2018-08-27 DIAGNOSIS — E11.42 DIABETIC POLYNEUROPATHY ASSOCIATED WITH TYPE 2 DIABETES MELLITUS (HCC): ICD-10-CM

## 2018-08-27 DIAGNOSIS — M79.675 PAIN IN TOES OF BOTH FEET: ICD-10-CM

## 2018-08-27 DIAGNOSIS — M79.674 PAIN IN TOES OF BOTH FEET: ICD-10-CM

## 2018-08-27 PROCEDURE — 2022F DILAT RTA XM EVC RTNOPTHY: CPT | Performed by: PODIATRIST

## 2018-08-27 PROCEDURE — 3046F HEMOGLOBIN A1C LEVEL >9.0%: CPT | Performed by: PODIATRIST

## 2018-08-27 PROCEDURE — G8417 CALC BMI ABV UP PARAM F/U: HCPCS | Performed by: PODIATRIST

## 2018-08-27 PROCEDURE — 4004F PT TOBACCO SCREEN RCVD TLK: CPT | Performed by: PODIATRIST

## 2018-08-27 PROCEDURE — G8427 DOCREV CUR MEDS BY ELIG CLIN: HCPCS | Performed by: PODIATRIST

## 2018-08-27 PROCEDURE — 11750 EXCISION NAIL&NAIL MATRIX: CPT | Performed by: PODIATRIST

## 2018-08-27 NOTE — PROGRESS NOTES
[facility]  [facility] [address]  [address] [place] [address]  Dept: [phone]  Dept Fax: [phone]    RETURN PATIENT PROGRESS NOTE  Date of patient's visit: 8/27/2018  Patient's Name:  Alma Sebastian YOB: 1970            Patient Care Team:  Libby Barbour DO as PCP - General  Sammie Francis DPM as Physician (Podiatry)       Alma Sebastian 51 y.o. male that presents for possible nail avulsion of left great toe nail  Chief Complaint   Patient presents with    Diabetes    Nail Problem     left great toe nail       Symptoms began  Off and on for years. Patient relates he has neuropathy so can't assess the level of painTreatments prior to today's visit include: trimming by himself. Currently denies F/C/N/V. Allergies   Allergen Reactions    Codeine     Ibuprofen        Past Medical History:   Diagnosis Date    Hyperlipidemia     Hypertension     Kidney calculi     Lumbar radiculopathy 6/9/2017    Obstructive sleep apnea syndrome 6/9/2017    Restless legs syndrome 6/9/2017    Severe obesity (BMI 35.0-39. 9) with comorbidity ([address] 7/31/2018       Prior to Admission medications    Medication Sig Start Date End Date Taking? Authorizing Provider   pregabalin (LYRICA) 50 MG capsule Take 1 capsule by mouth 3 times daily for 120 days. . 6/11/18 10/9/18  Sammie Francis DPM   Insulin Lispro (HUMALOG KWIKPEN SC) Inject into the skin 3 times daily (with meals) Per scale    Historical Provider, MD   gabapentin (NEURONTIN) 300 MG capsule Take 300 mg by mouth 3 times daily.     Historical Provider, MD   Insulin Glargine (LANTUS SC) Inject 60 Units into the skin nightly    Historical Provider, MD   cephALEXin (KEFLEX) 500 MG capsule Take 1 capsule by mouth 3 times daily 2/2/18   Slade Bernardo MD   hydrochlorothiazide (HYDRODIURIL) 25 MG tablet Take 25 mg by mouth 6/9/17   Historical Provider, MD   rosuvastatin (CRESTOR) 40 MG tablet Take 40 mg by mouth 6/9/17 dressing. The patient was instructed to leave this dressing clean/dry/intact until the following am at which point they will begin warm epsom salt soaks followed by application of antibiotic ointment and Band-Aid BID. Patient instructed to take Tylenol PRN pain. Post-operative chemical matrixectomy instruction sheet dispensed to patient. Verbal and written instructions given to patient. Contact office with any questions/problems/concerns. No orders of the defined types were placed in this encounter. No orders of the defined types were placed in this encounter.        RTC in 1week(s).    8/27/2018      Electronically signed by Rose Drake DPM on 8/27/2018 at 1:59 PM  8/27/2018

## 2018-09-05 ENCOUNTER — OFFICE VISIT (OUTPATIENT)
Dept: PODIATRY | Age: 48
End: 2018-09-05

## 2018-09-05 VITALS — RESPIRATION RATE: 16 BRPM | HEIGHT: 73 IN | WEIGHT: 274 LBS | BODY MASS INDEX: 36.31 KG/M2

## 2018-09-05 DIAGNOSIS — E11.51 TYPE II DIABETES MELLITUS WITH PERIPHERAL CIRCULATORY DISORDER (HCC): Primary | ICD-10-CM

## 2018-09-05 DIAGNOSIS — Z98.890 POST-OPERATIVE STATE: ICD-10-CM

## 2018-09-05 PROCEDURE — 99024 POSTOP FOLLOW-UP VISIT: CPT | Performed by: PODIATRIST

## 2018-09-05 NOTE — PROGRESS NOTES
seconds, Bilateral.  Hair growth absent to the level of the digits, Bilateral.  Edema present, Bilateral.  Varicosities absent, Bilateral. Erythema absent, Bilateral    Neurological: Sensation diminshed to light touch to level of digits, Bilateral.  Protective sensation intact 6/10 sites via 5.07/10g [place] Monofilament, Bilateral.  negative Tinel's, Bilateral.  negative Valleix sign, Bilateral.      Integument: Warm, dry, supple, Bilateral.  Open lesion absent, Bilateral.  Interdigital maceration absent to web spaces 4, Bilateral.  Nails 1-5 left and 1-5 right thickened > 3.0 mm, dystrophic and crumbly, discolored with subungual debris. Fissures absent, Bilateral.     Asessment: Patient is a 51 y.o. male with:    Diagnosis Orders   1. Type II diabetes mellitus with peripheral circulatory disorder (HCC)     2. Post-operative state           Plan: Patient examined and evaluated. Current condition and treatment options discussed in detail. Advised pt to continue soaking and apply bandages daily. Let open to air at night. .  Verbal and written instructions given to patient. Contact office with any questions/problems/concerns. No orders of the defined types were placed in this encounter. No orders of the defined types were placed in this encounter. RTC in 3week(s).     9/5/2018      Electronically signed by Angie Lira DPM on 9/5/2018 at 2:44 PM  9/5/2018

## 2018-10-18 ENCOUNTER — OFFICE VISIT (OUTPATIENT)
Dept: FAMILY MEDICINE CLINIC | Age: 48
End: 2018-10-18
Payer: MEDICARE

## 2018-10-18 VITALS
DIASTOLIC BLOOD PRESSURE: 72 MMHG | WEIGHT: 284.2 LBS | BODY MASS INDEX: 37.67 KG/M2 | HEART RATE: 103 BPM | SYSTOLIC BLOOD PRESSURE: 122 MMHG | OXYGEN SATURATION: 92 % | HEIGHT: 73 IN

## 2018-10-18 DIAGNOSIS — B37.9 YEAST INFECTION: ICD-10-CM

## 2018-10-18 DIAGNOSIS — E66.01 CLASS 2 SEVERE OBESITY DUE TO EXCESS CALORIES WITH SERIOUS COMORBIDITY AND BODY MASS INDEX (BMI) OF 37.0 TO 37.9 IN ADULT (HCC): ICD-10-CM

## 2018-10-18 DIAGNOSIS — E11.42 DIABETIC POLYNEUROPATHY ASSOCIATED WITH TYPE 2 DIABETES MELLITUS (HCC): ICD-10-CM

## 2018-10-18 DIAGNOSIS — E11.65 TYPE 2 DIABETES MELLITUS WITH HYPERGLYCEMIA, WITH LONG-TERM CURRENT USE OF INSULIN (HCC): Primary | ICD-10-CM

## 2018-10-18 DIAGNOSIS — Z79.4 TYPE 2 DIABETES MELLITUS WITH HYPERGLYCEMIA, WITH LONG-TERM CURRENT USE OF INSULIN (HCC): Primary | ICD-10-CM

## 2018-10-18 DIAGNOSIS — Z87.891 PERSONAL HISTORY OF TOBACCO USE, PRESENTING HAZARDS TO HEALTH: ICD-10-CM

## 2018-10-18 LAB — HBA1C MFR BLD: 5.5 %

## 2018-10-18 PROCEDURE — G8484 FLU IMMUNIZE NO ADMIN: HCPCS | Performed by: NURSE PRACTITIONER

## 2018-10-18 PROCEDURE — 83036 HEMOGLOBIN GLYCOSYLATED A1C: CPT | Performed by: NURSE PRACTITIONER

## 2018-10-18 PROCEDURE — 99205 OFFICE O/P NEW HI 60 MIN: CPT | Performed by: NURSE PRACTITIONER

## 2018-10-18 PROCEDURE — 2022F DILAT RTA XM EVC RTNOPTHY: CPT | Performed by: NURSE PRACTITIONER

## 2018-10-18 PROCEDURE — 1036F TOBACCO NON-USER: CPT | Performed by: NURSE PRACTITIONER

## 2018-10-18 PROCEDURE — G8417 CALC BMI ABV UP PARAM F/U: HCPCS | Performed by: NURSE PRACTITIONER

## 2018-10-18 PROCEDURE — G8427 DOCREV CUR MEDS BY ELIG CLIN: HCPCS | Performed by: NURSE PRACTITIONER

## 2018-10-18 PROCEDURE — 3044F HG A1C LEVEL LT 7.0%: CPT | Performed by: NURSE PRACTITIONER

## 2018-10-18 RX ORDER — NYSTATIN 100000 U/G
CREAM TOPICAL
Qty: 15 G | Refills: 3 | Status: SHIPPED | OUTPATIENT
Start: 2018-10-18 | End: 2020-02-12 | Stop reason: SDUPTHER

## 2018-10-18 RX ORDER — NYSTATIN 100000 [USP'U]/G
POWDER TOPICAL
Qty: 15 G | Refills: 3 | Status: SHIPPED | OUTPATIENT
Start: 2018-10-18 | End: 2019-03-05 | Stop reason: SDUPTHER

## 2018-10-18 ASSESSMENT — PATIENT HEALTH QUESTIONNAIRE - PHQ9
2. FEELING DOWN, DEPRESSED OR HOPELESS: 0
SUM OF ALL RESPONSES TO PHQ9 QUESTIONS 1 & 2: 0
SUM OF ALL RESPONSES TO PHQ QUESTIONS 1-9: 0
SUM OF ALL RESPONSES TO PHQ QUESTIONS 1-9: 0
1. LITTLE INTEREST OR PLEASURE IN DOING THINGS: 0

## 2018-10-18 NOTE — PATIENT INSTRUCTIONS
Patient Education        Learning About Diabetes Food Guidelines  Your Care Instructions    Meal planning is important to manage diabetes. It helps keep your blood sugar at a target level (which you set with your doctor). You don't have to eat special foods. You can eat what your family eats, including sweets once in a while. But you do have to pay attention to how often you eat and how much you eat of certain foods. You may want to work with a dietitian or a certified diabetes educator (CDE) to help you plan meals and snacks. A dietitian or CDE can also help you lose weight if that is one of your goals. What should you know about eating carbs? Managing the amount of carbohydrate (carbs) you eat is an important part of healthy meals when you have diabetes. Carbohydrate is found in many foods. · Learn which foods have carbs. And learn the amounts of carbs in different foods. ¨ Bread, cereal, pasta, and rice have about 15 grams of carbs in a serving. A serving is 1 slice of bread (1 ounce), ½ cup of cooked cereal, or 1/3 cup of cooked pasta or rice. ¨ Fruits have 15 grams of carbs in a serving. A serving is 1 small fresh fruit, such as an apple or orange; ½ of a banana; ½ cup of cooked or canned fruit; ½ cup of fruit juice; 1 cup of melon or raspberries; or 2 tablespoons of dried fruit. ¨ Milk and no-sugar-added yogurt have 15 grams of carbs in a serving. A serving is 1 cup of milk or 2/3 cup of no-sugar-added yogurt. ¨ Starchy vegetables have 15 grams of carbs in a serving. A serving is ½ cup of mashed potatoes or sweet potato; 1 cup winter squash; ½ of a small baked potato; ½ cup of cooked beans; or ½ cup cooked corn or green peas. · Learn how much carbs to eat each day and at each meal. A dietitian or CDE can teach you how to keep track of the amount of carbs you eat. This is called carbohydrate counting. · If you are not sure how to count carbohydrate grams, use the Plate Method to plan meals.  It is a some people, it is hard to have a drink with friends without smoking. For others, they might skip a coffee break with coworkers who smoke. ¨ Change your daily routine. Take a different route to work or eat a meal in a different place. · Cut down on stress. Calm yourself or release tension by doing an activity you enjoy, such as reading a book, taking a hot bath, or gardening. · Talk to your doctor or pharmacist about nicotine replacement therapy, which replaces the nicotine in your body. You still get nicotine but you do not use tobacco. Nicotine replacement products help you slowly reduce the amount of nicotine you need. These products come in several forms, many of them available over-the-counter:  ¨ Nicotine patches  ¨ Nicotine gum and lozenges  ¨ Nicotine inhaler  · Ask your doctor about bupropion (Wellbutrin) or varenicline (Chantix), which are prescription medicines. They do not contain nicotine. They help you by reducing withdrawal symptoms, such as stress and anxiety. · Some people find hypnosis, acupuncture, and massage helpful for ending the smoking habit. · Eat a healthy diet and get regular exercise. Having healthy habits will help your body move past its craving for nicotine. · Be prepared to keep trying. Most people are not successful the first few times they try to quit. Do not get mad at yourself if you smoke again. Make a list of things you learned and think about when you want to try again, such as next week, next month, or next year. Where can you learn more? Go to https://DelporfionaVaultLogix.MediaWheel. org and sign in to your Canvita account. Enter D662 in the [facility] box to learn more about \"Stopping Smoking: Care Instructions. \"     If you do not have an account, please click on the \"Sign Up Now\" link. Current as of: November 29, 2017  Content Version: 11.7  © 6000-0171 [employer]rooms.[employer], Incorporated. Care instructions adapted under license by [facility] ([facility]).  If you have risk of dying from lung cancer is cut by about half. And your risk for many other types of cancer is lower too. How would quitting help others in your life? When you quit smoking, you improve the health of everyone who now breathes in your smoke. · Their heart, lung, and cancer risks drop, much like yours. · They are sick less. For babies and small children, living smoke-free means they're less likely to have ear infections, pneumonia, and bronchitis. · If you're a woman who is or will be pregnant someday, quitting smoking means a healthier . · Children who are close to you are less likely to become adult smokers. Where can you learn more? Go to https://EdRover.Mimoco. org and sign in to your Embrace+ account. Enter [phone] in the OrderingOnlineSystem.com box to learn more about \"Learning About Benefits From Quitting Smoking. \"     If you do not have an account, please click on the \"Sign Up Now\" link. Current as of: 2017  Content Version: 11.7  © 2429-1472 [employer]orporated. Care instructions adapted under license by [facility] ([facility]). If you have questions about a medical condition or this instruction, always ask your healthcare professional. [place] [address] any warranty or liability for your use of this information.

## 2018-10-18 NOTE — PROGRESS NOTES
Catheline Soulier, FNP-C    Ronnie Sanchez is a 51 y.o. male who is here with c/o of:    Chief Complaint: New Patient and Diabetes      Patient Accompanied by: patient and wife    CATRACHITA Sanchez is here to establish care and to discuss his chronic conditions including:    Diabetes Mellitus Type 2: Current symptoms/problems include none and neuropathy. Medication compliance:  compliant all of the time  Diabetic diet compliance:  compliant most of the time,  Weight trend: stable  Current exercise: no regular exercise  Barriers: lack of motivation    Home blood sugar records: fasting range: 100's  Any episodes of hypoglycemia? no  Eye exam current (within one year): yes   reports that he has quit smoking. His smoking use included E-Cigarettes. He has a 60.00 pack-year smoking history. He has never used smokeless tobacco.   Daily Aspirin? No:     Lab Results   Component Value Date    LABA1C 5.5 10/18/2018     Lab Results   Component Value Date    CREATININE 0.76 02/02/2018     No results found for: ALT, AST  No results found for: CHOL, TRIG, HDL, LDLCALC, LDLDIRECT       Patient Active Problem List:     Diabetes mellitus ([address]     Hyperlipidemia     Hypertension     Lumbar radiculopathy     Obstructive sleep apnea syndrome     Restless legs syndrome     Severe obesity (BMI 35.0-39. 9) with comorbidity [facility])     Past Medical History:   Diagnosis Date    Hyperlipidemia     Hypertension     Kidney calculi     Lumbar radiculopathy 6/9/2017    Obstructive sleep apnea syndrome 6/9/2017    Restless legs syndrome 6/9/2017    Severe obesity (BMI 35.0-39. 9) with comorbidity ([address] 7/31/2018      Past Surgical History:   Procedure Laterality Date    ANKLE SURGERY Right     SHOULDER SURGERY Right      Family History   Problem Relation Age of Onset    High Cholesterol Mother     Other Mother     Depression Mother     Heart Disease Father     High Cholesterol Father     Alcohol Abuse Father      Social

## 2018-10-19 ENCOUNTER — TELEPHONE (OUTPATIENT)
Dept: FAMILY MEDICINE CLINIC | Age: 48
End: 2018-10-19

## 2018-10-24 DIAGNOSIS — E11.65 TYPE 2 DIABETES MELLITUS WITH HYPERGLYCEMIA, WITH LONG-TERM CURRENT USE OF INSULIN (HCC): ICD-10-CM

## 2018-10-24 DIAGNOSIS — Z79.4 TYPE 2 DIABETES MELLITUS WITH HYPERGLYCEMIA, WITH LONG-TERM CURRENT USE OF INSULIN (HCC): ICD-10-CM

## 2018-11-13 ENCOUNTER — OFFICE VISIT (OUTPATIENT)
Dept: NEUROLOGY | Age: 48
End: 2018-11-13
Payer: MEDICARE

## 2018-11-13 VITALS
WEIGHT: 286.2 LBS | DIASTOLIC BLOOD PRESSURE: 81 MMHG | BODY MASS INDEX: 38.76 KG/M2 | HEART RATE: 100 BPM | SYSTOLIC BLOOD PRESSURE: 126 MMHG | HEIGHT: 72 IN

## 2018-11-13 DIAGNOSIS — M79.2 INTRACTABLE NEUROPATHIC PAIN OF FOOT, UNSPECIFIED LATERALITY: Primary | ICD-10-CM

## 2018-11-13 DIAGNOSIS — R20.0 NUMBNESS AND TINGLING IN BOTH HANDS: ICD-10-CM

## 2018-11-13 DIAGNOSIS — E11.42 DIABETIC POLYNEUROPATHY ASSOCIATED WITH TYPE 2 DIABETES MELLITUS (HCC): ICD-10-CM

## 2018-11-13 DIAGNOSIS — M62.838 MUSCLE SPASM OF BOTH LOWER LEGS: ICD-10-CM

## 2018-11-13 DIAGNOSIS — R20.2 NUMBNESS AND TINGLING IN BOTH HANDS: ICD-10-CM

## 2018-11-13 PROCEDURE — G8484 FLU IMMUNIZE NO ADMIN: HCPCS | Performed by: PSYCHIATRY & NEUROLOGY

## 2018-11-13 PROCEDURE — G8427 DOCREV CUR MEDS BY ELIG CLIN: HCPCS | Performed by: PSYCHIATRY & NEUROLOGY

## 2018-11-13 PROCEDURE — 99204 OFFICE O/P NEW MOD 45 MIN: CPT | Performed by: PSYCHIATRY & NEUROLOGY

## 2018-11-13 PROCEDURE — 2022F DILAT RTA XM EVC RTNOPTHY: CPT | Performed by: PSYCHIATRY & NEUROLOGY

## 2018-11-13 PROCEDURE — 3044F HG A1C LEVEL LT 7.0%: CPT | Performed by: PSYCHIATRY & NEUROLOGY

## 2018-11-13 PROCEDURE — G8417 CALC BMI ABV UP PARAM F/U: HCPCS | Performed by: PSYCHIATRY & NEUROLOGY

## 2018-11-13 PROCEDURE — 1036F TOBACCO NON-USER: CPT | Performed by: PSYCHIATRY & NEUROLOGY

## 2018-11-13 RX ORDER — AMITRIPTYLINE HYDROCHLORIDE 25 MG/1
TABLET, FILM COATED ORAL
Qty: 30 TABLET | Refills: 0 | Status: SHIPPED | OUTPATIENT
Start: 2018-11-13 | End: 2018-12-26 | Stop reason: SDUPTHER

## 2018-11-13 RX ORDER — AMITRIPTYLINE HYDROCHLORIDE 25 MG/1
TABLET, FILM COATED ORAL
Qty: 60 TABLET | Refills: 0 | Status: SHIPPED | OUTPATIENT
Start: 2018-12-12 | End: 2019-09-05 | Stop reason: SDUPTHER

## 2018-11-13 RX ORDER — TIZANIDINE 2 MG/1
TABLET ORAL
Qty: 10 TABLET | Refills: 0 | Status: SHIPPED | OUTPATIENT
Start: 2018-11-13 | End: 2019-03-05 | Stop reason: ALTCHOICE

## 2018-11-13 NOTE — PROGRESS NOTES
NEUROLOGY CONSULT  Patient Name:       Lili Diallo  :        1970  Clinic Visit Date:    2018    Dear Dr. Tenzin Walker, APRN - CNP     I had the opportunity to see your patient, Mr. Lili Diallo in neurology consultation today. As you know he  is a 51 y.o. right handed  male presented with c/o pain in both lower legs and feet for the past 3 years. He stated that he was borderline diabetic about 3 years ago and let her he was told to have full blown diabetes with uncontrolled blood sugars. He was on multiple diabetic medications including insulin, Victoza, etc.  His blood sugars had been under control but his symptoms had been getting worse. He has had NCS/EMG testing of lower extremities and he was told to have neuropathy. He was on gabapentin 300 mg 3 times daily and it has caused increased sleepiness and he stopped taking it. He also tried Lyrica and Cymbalta with no help. He has been having stinging sensation along with tingling and numbness in lower legs and feet. He also has been having painful sensations in both hands and those are described as clue-like sensation in medial aspect of both forearms and last 2 digits. He has been having trouble walking with increased pain upon walking. He denies radiating pain and paresthesias across the lower back. Denies bladder and bowel incontinence. He has been having extreme difficulty walking with unsteady gait with progressive pain in bilateral lower extremities. Review of systems done by staff, Martine Sparks reviewed and pertinent positives include numbness, weakness, spasms, pain in both legs, unsteady gait, back pain and restless legs as stated above.         Current Outpatient Prescriptions on File Prior to Visit   Medication Sig Dispense Refill    insulin lispro (HUMALOG KWIKPEN) 100 UNIT/ML pen Sliding scale - maximum dose 16 units 5 pen 3    insulin glargine (BASAGLAR KWIKPEN) 100 UNIT/ML injection pen Inject 50 Units into program.  Although every effort was made to ensure the accuracy of this  automated transcription, some errors in transcription may have occurred, occasionally words are mis-transcribed.

## 2018-11-13 NOTE — LETTER
[facility] Neurology Specialist  [address] [place] [address] Road [address]  Phone: [phone]  Fax: [phone]    Magno Reyna MD        2018     BRENNON Liriano - CNP  [address] [place] [address],[address] [address]    Patient: Hansel Pepe  MR Number: [id number]  YOB: 1970  Date of Visit: 2018    Dear Dr. Jasmin Smith: Thank you for the request for consultation for Tamir Irving to me for the evaluation of PUNEET MELARA  Below are the relevant portions of my assessment and plan of care. NEUROLOGY FOLLOW-UP  Patient Name:  Hansel Pepe  :   1970  Clinic Visit Date: 2018        REVIEW OF SYSTEMS  Constitutional Weight changes: absent, Fevers : absent, Fatigue: absent; Any recent hospitalizations:  absent.    HEENT Ears: normal, Eyes: glasses, Visual disturbance: absent   Reespiratory Shortness of breath: absent, Cough: absent   Cardivascular Chest pain: absent, Leg swelling :present   GI Constipation: absent, Diarrhea: absent, Swallowing change: absent    Urinary frequency: absent, Urinary urgency: absent, Urinary incontinence: absent   Musculoskeletal Neck pain: absent, Back pain: present, Stiffness: absent, Muscle pain: absent, Joint pain: absent   Dermatological Hair loss: present, Skin changes: absent   Neurological Memory loss: absent, Confusion: absent, Seizures: absent; Trouble walking or imbalance: present, Dizziness: absent, Weakness: absent, Numbness present, Tremor: absent, Spasm: present, Speech difficulty: absent, Headache: absent, Light sensitivity: absent   Psychiatric Anxiety: absent, Depression  absent, Suicidal ideations absent   Hematologic Abnormal bleeding: absent, Anemia: absent, Clotting disorder: absent, Lymph gland changes: absent           NEUROLOGY CONSULT  Patient Name:       Hansel Pepe  :        1970  Clinic Visit Date:    2018    Dear Dr. Jasmin Smith, APRN - CNP

## 2018-11-13 NOTE — PROGRESS NOTES
NEUROLOGY FOLLOW-UP  Patient Name:  Frida Hastings  :   1970  Clinic Visit Date: 2018        REVIEW OF SYSTEMS  Constitutional Weight changes: absent, Fevers : absent, Fatigue: absent; Any recent hospitalizations:  absent.    HEENT Ears: normal, Eyes: glasses, Visual disturbance: absent   Reespiratory Shortness of breath: absent, Cough: absent   Cardivascular Chest pain: absent, Leg swelling :present   GI Constipation: absent, Diarrhea: absent, Swallowing change: absent    Urinary frequency: absent, Urinary urgency: absent, Urinary incontinence: absent   Musculoskeletal Neck pain: absent, Back pain: present, Stiffness: absent, Muscle pain: absent, Joint pain: absent   Dermatological Hair loss: present, Skin changes: absent   Neurological Memory loss: absent, Confusion: absent, Seizures: absent; Trouble walking or imbalance: present, Dizziness: absent, Weakness: absent, Numbness present, Tremor: absent, Spasm: present, Speech difficulty: absent, Headache: absent, Light sensitivity: absent   Psychiatric Anxiety: absent, Depression  absent, Suicidal ideations absent   Hematologic Abnormal bleeding: absent, Anemia: absent, Clotting disorder: absent, Lymph gland changes: absent

## 2018-11-14 ENCOUNTER — OFFICE VISIT (OUTPATIENT)
Dept: PODIATRY | Age: 48
End: 2018-11-14
Payer: MEDICARE

## 2018-11-14 VITALS — HEIGHT: 73 IN | BODY MASS INDEX: 37.64 KG/M2 | WEIGHT: 284 LBS | RESPIRATION RATE: 16 BRPM

## 2018-11-14 DIAGNOSIS — B35.1 DERMATOPHYTOSIS OF NAIL: ICD-10-CM

## 2018-11-14 DIAGNOSIS — M79.672 BILATERAL FOOT PAIN: ICD-10-CM

## 2018-11-14 DIAGNOSIS — I73.9 PVD (PERIPHERAL VASCULAR DISEASE) (HCC): ICD-10-CM

## 2018-11-14 DIAGNOSIS — E11.51 TYPE II DIABETES MELLITUS WITH PERIPHERAL CIRCULATORY DISORDER (HCC): Primary | ICD-10-CM

## 2018-11-14 DIAGNOSIS — M79.671 BILATERAL FOOT PAIN: ICD-10-CM

## 2018-11-14 PROCEDURE — G8417 CALC BMI ABV UP PARAM F/U: HCPCS | Performed by: PODIATRIST

## 2018-11-14 PROCEDURE — 1036F TOBACCO NON-USER: CPT | Performed by: PODIATRIST

## 2018-11-14 PROCEDURE — 2022F DILAT RTA XM EVC RTNOPTHY: CPT | Performed by: PODIATRIST

## 2018-11-14 PROCEDURE — 3044F HG A1C LEVEL LT 7.0%: CPT | Performed by: PODIATRIST

## 2018-11-14 PROCEDURE — G8484 FLU IMMUNIZE NO ADMIN: HCPCS | Performed by: PODIATRIST

## 2018-11-14 PROCEDURE — G8428 CUR MEDS NOT DOCUMENT: HCPCS | Performed by: PODIATRIST

## 2018-11-14 PROCEDURE — 11721 DEBRIDE NAIL 6 OR MORE: CPT | Performed by: PODIATRIST

## 2018-11-14 PROCEDURE — 99213 OFFICE O/P EST LOW 20 MIN: CPT | Performed by: PODIATRIST

## 2018-11-14 NOTE — PROGRESS NOTES
[]Yes    []No    Plan:   Pt was evaluated and examined. Patient was given personalized discharge instructions. Nails 1-10 were debrided sharply in length and thickness with a nipper and , without incident. Pt will follow up in 9 weeks or sooner if any problems arise. Diagnosis was discussed with the pt and all of their questions were answered in detail. Proper foot hygiene and care was discussed with the pt. Informed patient on proper diabetic foot care and importance of tight glycemic control. Patient to check feet daily and contact the office with any questions/problems/concerns.    Other comorbidity noted and will be managed by PCP.  11/14/2018    Electronically signed by Shakila Márquez DPM on 11/14/2018 at 1:39 PM  11/14/2018

## 2018-12-27 RX ORDER — AMITRIPTYLINE HYDROCHLORIDE 25 MG/1
50 TABLET, FILM COATED ORAL NIGHTLY
Qty: 60 TABLET | Refills: 1 | Status: SHIPPED | OUTPATIENT
Start: 2018-12-27 | End: 2019-01-29 | Stop reason: SDUPTHER

## 2019-01-02 ENCOUNTER — TELEPHONE (OUTPATIENT)
Dept: INTERNAL MEDICINE CLINIC | Age: 49
End: 2019-01-02

## 2019-01-02 DIAGNOSIS — K21.9 GASTROESOPHAGEAL REFLUX DISEASE WITHOUT ESOPHAGITIS: Primary | ICD-10-CM

## 2019-01-02 RX ORDER — DEXLANSOPRAZOLE 30 MG/1
30 CAPSULE, DELAYED RELEASE ORAL DAILY
Qty: 30 CAPSULE | Refills: 0 | Status: SHIPPED | OUTPATIENT
Start: 2019-01-02 | End: 2019-01-03 | Stop reason: ALTCHOICE

## 2019-01-03 ENCOUNTER — TELEPHONE (OUTPATIENT)
Dept: FAMILY MEDICINE CLINIC | Age: 49
End: 2019-01-03

## 2019-01-03 DIAGNOSIS — K21.9 GASTROESOPHAGEAL REFLUX DISEASE WITHOUT ESOPHAGITIS: Primary | ICD-10-CM

## 2019-01-03 RX ORDER — OMEPRAZOLE 20 MG/1
20 CAPSULE, DELAYED RELEASE ORAL
Qty: 30 CAPSULE | Refills: 5 | Status: SHIPPED | OUTPATIENT
Start: 2019-01-03 | End: 2019-11-15 | Stop reason: SDUPTHER

## 2019-01-16 ENCOUNTER — OFFICE VISIT (OUTPATIENT)
Dept: PODIATRY | Age: 49
End: 2019-01-16
Payer: MEDICARE

## 2019-01-16 ENCOUNTER — OFFICE VISIT (OUTPATIENT)
Dept: FAMILY MEDICINE CLINIC | Age: 49
End: 2019-01-16
Payer: MEDICARE

## 2019-01-16 VITALS — RESPIRATION RATE: 16 BRPM | WEIGHT: 284 LBS | HEIGHT: 73 IN | BODY MASS INDEX: 37.64 KG/M2

## 2019-01-16 VITALS
BODY MASS INDEX: 38 KG/M2 | WEIGHT: 288 LBS | DIASTOLIC BLOOD PRESSURE: 72 MMHG | OXYGEN SATURATION: 95 % | RESPIRATION RATE: 16 BRPM | HEART RATE: 95 BPM | SYSTOLIC BLOOD PRESSURE: 158 MMHG

## 2019-01-16 DIAGNOSIS — E11.42 DIABETIC POLYNEUROPATHY ASSOCIATED WITH TYPE 2 DIABETES MELLITUS (HCC): Primary | ICD-10-CM

## 2019-01-16 DIAGNOSIS — B35.1 DERMATOPHYTOSIS OF NAIL: ICD-10-CM

## 2019-01-16 DIAGNOSIS — L91.8 SKIN TAGS, MULTIPLE ACQUIRED: ICD-10-CM

## 2019-01-16 DIAGNOSIS — I73.9 PVD (PERIPHERAL VASCULAR DISEASE) (HCC): ICD-10-CM

## 2019-01-16 DIAGNOSIS — R03.0 ELEVATED BP WITHOUT DIAGNOSIS OF HYPERTENSION: ICD-10-CM

## 2019-01-16 DIAGNOSIS — E11.8 TYPE 2 DIABETES MELLITUS WITH COMPLICATION, UNSPECIFIED WHETHER LONG TERM INSULIN USE: ICD-10-CM

## 2019-01-16 DIAGNOSIS — Z23 IMMUNIZATION DUE: ICD-10-CM

## 2019-01-16 DIAGNOSIS — M79.671 BILATERAL FOOT PAIN: ICD-10-CM

## 2019-01-16 DIAGNOSIS — M79.672 BILATERAL FOOT PAIN: ICD-10-CM

## 2019-01-16 DIAGNOSIS — E11.51 TYPE II DIABETES MELLITUS WITH PERIPHERAL CIRCULATORY DISORDER (HCC): Primary | ICD-10-CM

## 2019-01-16 LAB
BILIRUBIN, POC: NORMAL
BLOOD URINE, POC: NORMAL
CLARITY, POC: NORMAL
COLOR, POC: YELLOW
CREATININE URINE POCT: NORMAL
GLUCOSE URINE, POC: NORMAL
HBA1C MFR BLD: 5.6 %
KETONES, POC: NORMAL
LEUKOCYTE EST, POC: NORMAL
MICROALBUMIN/CREAT 24H UR: NORMAL MG/G{CREAT}
MICROALBUMIN/CREAT UR-RTO: NORMAL
NITRITE, POC: NORMAL
PH, POC: 5.5
PROTEIN, POC: NORMAL
SPECIFIC GRAVITY, POC: 1.02
UROBILINOGEN, POC: 0.2

## 2019-01-16 PROCEDURE — G8484 FLU IMMUNIZE NO ADMIN: HCPCS | Performed by: NURSE PRACTITIONER

## 2019-01-16 PROCEDURE — 1036F TOBACCO NON-USER: CPT | Performed by: NURSE PRACTITIONER

## 2019-01-16 PROCEDURE — 11721 DEBRIDE NAIL 6 OR MORE: CPT | Performed by: PODIATRIST

## 2019-01-16 PROCEDURE — 99215 OFFICE O/P EST HI 40 MIN: CPT | Performed by: NURSE PRACTITIONER

## 2019-01-16 PROCEDURE — 90732 PPSV23 VACC 2 YRS+ SUBQ/IM: CPT | Performed by: NURSE PRACTITIONER

## 2019-01-16 PROCEDURE — G8417 CALC BMI ABV UP PARAM F/U: HCPCS | Performed by: NURSE PRACTITIONER

## 2019-01-16 PROCEDURE — 2022F DILAT RTA XM EVC RTNOPTHY: CPT | Performed by: PODIATRIST

## 2019-01-16 PROCEDURE — 3044F HG A1C LEVEL LT 7.0%: CPT | Performed by: PODIATRIST

## 2019-01-16 PROCEDURE — G8427 DOCREV CUR MEDS BY ELIG CLIN: HCPCS | Performed by: PODIATRIST

## 2019-01-16 PROCEDURE — G8427 DOCREV CUR MEDS BY ELIG CLIN: HCPCS | Performed by: NURSE PRACTITIONER

## 2019-01-16 PROCEDURE — 90471 IMMUNIZATION ADMIN: CPT | Performed by: NURSE PRACTITIONER

## 2019-01-16 PROCEDURE — 3044F HG A1C LEVEL LT 7.0%: CPT | Performed by: NURSE PRACTITIONER

## 2019-01-16 PROCEDURE — G8417 CALC BMI ABV UP PARAM F/U: HCPCS | Performed by: PODIATRIST

## 2019-01-16 PROCEDURE — 82044 UR ALBUMIN SEMIQUANTITATIVE: CPT | Performed by: NURSE PRACTITIONER

## 2019-01-16 PROCEDURE — G8484 FLU IMMUNIZE NO ADMIN: HCPCS | Performed by: PODIATRIST

## 2019-01-16 PROCEDURE — 99213 OFFICE O/P EST LOW 20 MIN: CPT | Performed by: PODIATRIST

## 2019-01-16 PROCEDURE — 1036F TOBACCO NON-USER: CPT | Performed by: PODIATRIST

## 2019-01-16 PROCEDURE — 83036 HEMOGLOBIN GLYCOSYLATED A1C: CPT | Performed by: NURSE PRACTITIONER

## 2019-01-16 PROCEDURE — 90472 IMMUNIZATION ADMIN EACH ADD: CPT | Performed by: NURSE PRACTITIONER

## 2019-01-16 PROCEDURE — 90715 TDAP VACCINE 7 YRS/> IM: CPT | Performed by: NURSE PRACTITIONER

## 2019-01-16 PROCEDURE — 2022F DILAT RTA XM EVC RTNOPTHY: CPT | Performed by: NURSE PRACTITIONER

## 2019-01-21 RX ORDER — CALCIUM CITRATE/VITAMIN D3 200MG-6.25
TABLET ORAL
Qty: 100 EACH | Refills: 1 | Status: SHIPPED | OUTPATIENT
Start: 2019-01-21 | End: 2019-03-13 | Stop reason: SDUPTHER

## 2019-01-29 ENCOUNTER — OFFICE VISIT (OUTPATIENT)
Dept: NEUROLOGY | Age: 49
End: 2019-01-29
Payer: MEDICARE

## 2019-01-29 VITALS
HEIGHT: 73 IN | HEART RATE: 93 BPM | DIASTOLIC BLOOD PRESSURE: 86 MMHG | BODY MASS INDEX: 38.57 KG/M2 | WEIGHT: 291 LBS | SYSTOLIC BLOOD PRESSURE: 136 MMHG

## 2019-01-29 DIAGNOSIS — G56.03 BILATERAL CARPAL TUNNEL SYNDROME: ICD-10-CM

## 2019-01-29 DIAGNOSIS — M62.838 MUSCLE SPASM OF BOTH LOWER LEGS: ICD-10-CM

## 2019-01-29 DIAGNOSIS — E11.42 DIABETIC PERIPHERAL NEUROPATHY (HCC): Primary | ICD-10-CM

## 2019-01-29 PROCEDURE — 99214 OFFICE O/P EST MOD 30 MIN: CPT | Performed by: PSYCHIATRY & NEUROLOGY

## 2019-01-29 PROCEDURE — G8484 FLU IMMUNIZE NO ADMIN: HCPCS | Performed by: PSYCHIATRY & NEUROLOGY

## 2019-01-29 PROCEDURE — 3044F HG A1C LEVEL LT 7.0%: CPT | Performed by: PSYCHIATRY & NEUROLOGY

## 2019-01-29 PROCEDURE — G8417 CALC BMI ABV UP PARAM F/U: HCPCS | Performed by: PSYCHIATRY & NEUROLOGY

## 2019-01-29 PROCEDURE — G8427 DOCREV CUR MEDS BY ELIG CLIN: HCPCS | Performed by: PSYCHIATRY & NEUROLOGY

## 2019-01-29 PROCEDURE — 2022F DILAT RTA XM EVC RTNOPTHY: CPT | Performed by: PSYCHIATRY & NEUROLOGY

## 2019-01-29 PROCEDURE — 1036F TOBACCO NON-USER: CPT | Performed by: PSYCHIATRY & NEUROLOGY

## 2019-01-29 RX ORDER — AMITRIPTYLINE HYDROCHLORIDE 25 MG/1
TABLET, FILM COATED ORAL
Qty: 90 TABLET | Refills: 1 | Status: SHIPPED | OUTPATIENT
Start: 2019-02-10 | End: 2019-03-05 | Stop reason: SDUPTHER

## 2019-02-27 ENCOUNTER — OFFICE VISIT (OUTPATIENT)
Dept: ORTHOPEDIC SURGERY | Age: 49
End: 2019-02-27
Payer: MEDICARE

## 2019-02-27 VITALS — HEIGHT: 73 IN | BODY MASS INDEX: 37.91 KG/M2 | WEIGHT: 286 LBS

## 2019-02-27 DIAGNOSIS — G56.03 BILATERAL CARPAL TUNNEL SYNDROME: Primary | ICD-10-CM

## 2019-02-27 DIAGNOSIS — G56.23 CUBITAL TUNNEL SYNDROME OF BOTH UPPER EXTREMITIES: ICD-10-CM

## 2019-02-27 PROCEDURE — 99203 OFFICE O/P NEW LOW 30 MIN: CPT | Performed by: STUDENT IN AN ORGANIZED HEALTH CARE EDUCATION/TRAINING PROGRAM

## 2019-02-27 PROCEDURE — 3017F COLORECTAL CA SCREEN DOC REV: CPT | Performed by: STUDENT IN AN ORGANIZED HEALTH CARE EDUCATION/TRAINING PROGRAM

## 2019-02-27 PROCEDURE — G8484 FLU IMMUNIZE NO ADMIN: HCPCS | Performed by: STUDENT IN AN ORGANIZED HEALTH CARE EDUCATION/TRAINING PROGRAM

## 2019-02-27 PROCEDURE — G8427 DOCREV CUR MEDS BY ELIG CLIN: HCPCS | Performed by: STUDENT IN AN ORGANIZED HEALTH CARE EDUCATION/TRAINING PROGRAM

## 2019-02-27 PROCEDURE — G8417 CALC BMI ABV UP PARAM F/U: HCPCS | Performed by: STUDENT IN AN ORGANIZED HEALTH CARE EDUCATION/TRAINING PROGRAM

## 2019-02-27 PROCEDURE — 1036F TOBACCO NON-USER: CPT | Performed by: STUDENT IN AN ORGANIZED HEALTH CARE EDUCATION/TRAINING PROGRAM

## 2019-02-27 RX ORDER — CLINDAMYCIN PHOSPHATE 10 UG/ML
LOTION TOPICAL
COMMUNITY
Start: 2019-02-11 | End: 2019-03-05 | Stop reason: ALTCHOICE

## 2019-03-05 ENCOUNTER — HOSPITAL ENCOUNTER (OUTPATIENT)
Age: 49
Setting detail: SPECIMEN
Discharge: HOME | End: 2019-03-05
Payer: MEDICARE

## 2019-03-05 ENCOUNTER — OFFICE VISIT (OUTPATIENT)
Dept: DERMATOLOGY | Age: 49
End: 2019-03-05
Payer: MEDICARE

## 2019-03-05 VITALS
SYSTOLIC BLOOD PRESSURE: 163 MMHG | BODY MASS INDEX: 37.67 KG/M2 | HEIGHT: 73 IN | WEIGHT: 284.2 LBS | HEART RATE: 92 BPM | OXYGEN SATURATION: 95 % | DIASTOLIC BLOOD PRESSURE: 112 MMHG

## 2019-03-05 DIAGNOSIS — L91.8 SKIN TAG: ICD-10-CM

## 2019-03-05 DIAGNOSIS — L73.9 FOLLICULITIS: ICD-10-CM

## 2019-03-05 DIAGNOSIS — L91.8 INFLAMED SKIN TAG: ICD-10-CM

## 2019-03-05 DIAGNOSIS — L71.9 ROSACEA: ICD-10-CM

## 2019-03-05 DIAGNOSIS — L82.0 INFLAMED SEBORRHEIC KERATOSIS: Primary | ICD-10-CM

## 2019-03-05 PROCEDURE — 11200 RMVL SKIN TAGS UP TO&INC 15: CPT | Performed by: DERMATOLOGY

## 2019-03-05 PROCEDURE — 11305 SHAVE SKIN LESION 0.5 CM/<: CPT | Performed by: DERMATOLOGY

## 2019-03-05 PROCEDURE — 1036F TOBACCO NON-USER: CPT | Performed by: DERMATOLOGY

## 2019-03-05 PROCEDURE — G8427 DOCREV CUR MEDS BY ELIG CLIN: HCPCS | Performed by: DERMATOLOGY

## 2019-03-05 PROCEDURE — G8417 CALC BMI ABV UP PARAM F/U: HCPCS | Performed by: DERMATOLOGY

## 2019-03-05 PROCEDURE — 99203 OFFICE O/P NEW LOW 30 MIN: CPT | Performed by: DERMATOLOGY

## 2019-03-05 PROCEDURE — 3017F COLORECTAL CA SCREEN DOC REV: CPT | Performed by: DERMATOLOGY

## 2019-03-05 PROCEDURE — G8484 FLU IMMUNIZE NO ADMIN: HCPCS | Performed by: DERMATOLOGY

## 2019-03-05 RX ORDER — LIDOCAINE HYDROCHLORIDE AND EPINEPHRINE 10; 10 MG/ML; UG/ML
0.5 INJECTION, SOLUTION INFILTRATION; PERINEURAL ONCE
Status: COMPLETED | OUTPATIENT
Start: 2019-03-05 | End: 2019-03-05

## 2019-03-05 RX ADMIN — LIDOCAINE HYDROCHLORIDE AND EPINEPHRINE 0.5 ML: 10; 10 INJECTION, SOLUTION INFILTRATION; PERINEURAL at 10:12

## 2019-03-07 LAB — DERMATOLOGY PATHOLOGY REPORT: NORMAL

## 2019-03-13 RX ORDER — CALCIUM CITRATE/VITAMIN D3 200MG-6.25
TABLET ORAL
Qty: 100 EACH | Refills: 1 | Status: SHIPPED | OUTPATIENT
Start: 2019-03-13 | End: 2021-06-09

## 2019-03-13 RX ORDER — CALCIUM CITRATE/VITAMIN D3 200MG-6.25
TABLET ORAL
Qty: 100 EACH | Refills: 1 | Status: CANCELLED | OUTPATIENT
Start: 2019-03-13

## 2019-03-13 RX ORDER — LANCETS 33 GAUGE
EACH MISCELLANEOUS
Qty: 150 EACH | Refills: 11 | Status: SHIPPED | OUTPATIENT
Start: 2019-03-13 | End: 2019-10-15 | Stop reason: SDUPTHER

## 2019-03-13 RX ORDER — LANCING DEVICE
EACH MISCELLANEOUS
Qty: 120 EACH | Refills: 3 | Status: SHIPPED | OUTPATIENT
Start: 2019-03-13 | End: 2019-11-15

## 2019-03-14 RX ORDER — LANCING DEVICE
EACH MISCELLANEOUS
Qty: 1 EACH | Refills: 3 | Status: SHIPPED | OUTPATIENT
Start: 2019-03-14

## 2019-03-15 DIAGNOSIS — Z79.4 TYPE 2 DIABETES MELLITUS WITH HYPERGLYCEMIA, WITH LONG-TERM CURRENT USE OF INSULIN (HCC): ICD-10-CM

## 2019-03-15 DIAGNOSIS — E11.65 TYPE 2 DIABETES MELLITUS WITH HYPERGLYCEMIA, WITH LONG-TERM CURRENT USE OF INSULIN (HCC): ICD-10-CM

## 2019-03-15 RX ORDER — INSULIN GLARGINE 100 [IU]/ML
INJECTION, SOLUTION SUBCUTANEOUS
Qty: 60 ML | Refills: 11 | Status: SHIPPED | OUTPATIENT
Start: 2019-03-15 | End: 2019-08-14 | Stop reason: SDUPTHER

## 2019-03-20 ENCOUNTER — OFFICE VISIT (OUTPATIENT)
Dept: PODIATRY | Age: 49
End: 2019-03-20
Payer: MEDICARE

## 2019-03-20 VITALS — WEIGHT: 286 LBS | HEIGHT: 72 IN | BODY MASS INDEX: 38.74 KG/M2 | RESPIRATION RATE: 16 BRPM

## 2019-03-20 DIAGNOSIS — M79.672 BILATERAL FOOT PAIN: ICD-10-CM

## 2019-03-20 DIAGNOSIS — I73.9 PERIPHERAL VASCULAR DISORDER (HCC): ICD-10-CM

## 2019-03-20 DIAGNOSIS — B35.1 DERMATOPHYTOSIS OF NAIL: ICD-10-CM

## 2019-03-20 DIAGNOSIS — M79.671 BILATERAL FOOT PAIN: ICD-10-CM

## 2019-03-20 DIAGNOSIS — E11.51 TYPE II DIABETES MELLITUS WITH PERIPHERAL CIRCULATORY DISORDER (HCC): Primary | ICD-10-CM

## 2019-03-20 PROCEDURE — G8427 DOCREV CUR MEDS BY ELIG CLIN: HCPCS | Performed by: PODIATRIST

## 2019-03-20 PROCEDURE — 2022F DILAT RTA XM EVC RTNOPTHY: CPT | Performed by: PODIATRIST

## 2019-03-20 PROCEDURE — 3017F COLORECTAL CA SCREEN DOC REV: CPT | Performed by: PODIATRIST

## 2019-03-20 PROCEDURE — 11721 DEBRIDE NAIL 6 OR MORE: CPT | Performed by: PODIATRIST

## 2019-03-20 PROCEDURE — 99213 OFFICE O/P EST LOW 20 MIN: CPT | Performed by: PODIATRIST

## 2019-03-20 PROCEDURE — 3044F HG A1C LEVEL LT 7.0%: CPT | Performed by: PODIATRIST

## 2019-03-20 PROCEDURE — G8484 FLU IMMUNIZE NO ADMIN: HCPCS | Performed by: PODIATRIST

## 2019-03-20 PROCEDURE — G8417 CALC BMI ABV UP PARAM F/U: HCPCS | Performed by: PODIATRIST

## 2019-03-20 PROCEDURE — 1036F TOBACCO NON-USER: CPT | Performed by: PODIATRIST

## 2019-03-20 RX ORDER — AMMONIUM LACTATE 12 G/100G
CREAM TOPICAL
Qty: 1 BOTTLE | Refills: 4 | Status: SHIPPED | OUTPATIENT
Start: 2019-03-20 | End: 2019-10-03 | Stop reason: SDUPTHER

## 2019-04-04 ENCOUNTER — OFFICE VISIT (OUTPATIENT)
Dept: NEUROLOGY | Age: 49
End: 2019-04-04
Payer: MEDICARE

## 2019-04-04 DIAGNOSIS — R20.0 NUMBNESS AND TINGLING IN BOTH HANDS: Primary | ICD-10-CM

## 2019-04-04 DIAGNOSIS — R20.2 NUMBNESS AND TINGLING IN BOTH HANDS: Primary | ICD-10-CM

## 2019-04-04 PROCEDURE — 95912 NRV CNDJ TEST 11-12 STUDIES: CPT | Performed by: PSYCHIATRY & NEUROLOGY

## 2019-04-04 PROCEDURE — 95886 MUSC TEST DONE W/N TEST COMP: CPT | Performed by: PSYCHIATRY & NEUROLOGY

## 2019-04-04 NOTE — PROGRESS NOTES
[facility] [address] [place] [address]         Patient: Izabella Huerta Address: [address] [place] [address]. Sex: Male City: [place]  Age: 47 State: [place]  Height: 73 inches ZIP: [id number]  Weight: 291 lbs Phone: [phone]  I. D.#: [id number] Physician: KATTY Larsen   Ref. M.D.: Whitney Gannon MD Test Date: 04/04/19  PCP: ARSLAN Cuenca      History/Comments: This is a 48 y/o right handed  male with hx of diabetes presented with c/o numbness and tingling along with pins and needles sensation involving both hands predominantly involving all of the digits with nocturnal paresthesias and dysesthesias for the past 2 years. He has been having weak hand  and has been dropping objects due to weakness. He has severe numbness in the last 2 digits of both hands. He denies neck pain radiating down the extremities. Denies bladder dysfunction. Motor Nerve Study    Median Nerve  Rec Site:   APB Lat (ms) Norm Lat Amp (mV) Norm Amp Dist (mm) C.V. (m/s) Norm C.V.  STIM SITE L R   L R   L R L R    Wrist 4.6 4.1 <4.4  4.3 7.4 >4  70 70      Elbow 9.9 9.1   4.3 7.1   246 246 46.1 49.2 >50     Ulnar Nerve  Rec Site:   ADM Lat (ms) Norm Lat Amp (mV) Norm Amp Dist (mm) C.V. (m/s) Norm C.V.  STIM SITE L R   L R   L R L R    Wrist 3.3 3.4 <3.3  10.7 12.5 >6  70 70      B. Elbow 8.0 7.1   9.4 11.8   238 248 51.0 67.6 >50   A. Elbow 9.8 9.1   8.5 10.8   100 100 57.1 50.0        Sensory Nerve Study    Median Nerve  Rec Site:   2nd digit Norm Lat Pk Lat (ms)Amp (uV) Norm Amp Dist (mm) C.V. (m/s) Norm C.V.  STIM SITE   L R L R   L R L R    Wrist <3.5  3.8 4.0 26.0 13.6 >20  130 130 41.3 41.7 >50       Ulnar Nerve  Rec Site:   5th dig Norm Lat Pk Lat (ms)Amp (uV) Norm Amp Dist (mm) C.V. (m/s) Norm C.V.  STIM SITE   L R L R   L R L R    Wrist <3.1  3.3 3.3 15.0 14.3 >17  110 110 43.1 47.1 >50       S.  Radial Nerve  Rec Site:   snuff box Norm Lat Pk Lat (ms)Amp (uV) Norm Amp Dist (mm)  STIM 5. Right ENDER potential demonstrated reduced amplitude and left ENDER potential demonstrated normal amplitude. 6. Right median motor potential demonstrated slightly prolonged distal latency and slightly reduced velocity but with normal amplitude. 7. Left median motor potential demonstrated significantly prolonged distal latency with reduced amplitude and conduction velocity on side-to-side comparison. 8. Left ulnar motor potential recording ADM muscle demonstrated normal distal latency and conduction velocity with no velocity drop across the elbow segment  9. Right ulnar motor potential recording ADM muscle demonstrated normal distal latency and amplitude. But there is velocity drop across the elbow segment. 10. Needle recording using monopolar needle was performed in a sampling of C5-T1 innervated limb and paraspinal muscles in bilateral upper extremities. 11. Needle study of left upper extremity demonstrated increased insertional activity with small fibrillations in left ADM muscle. Rest of the muscles demonstrated normal-appearing motor units without any abnormal spontaneous activity in the muscles tested as stated above. 12. Needle study of right upper extremity demonstrated increased insertional activity with small fibrillations in right ADM muscle. Rest of the muscles demonstrated normal-appearing motor units without any abnormal spontaneous activity in the muscles tested as stated above. Electrodiagnostic Interpretation:  · There is electrophysiologic evidence of ulnar neuropathy at right elbow with demyelinating and axonal features. Supporting features include low ulnar snap, prolonged ulnar cmap with slowing of greater than 10-11 m/s across the elbow segment compared with the forearm segment. · This study also demonstrated electrophysiologic evidence of non-localizable ulnar neuropathy in left upper extremity with axonal features alone.   Supporting features include low ulnar snap and

## 2019-04-04 NOTE — PROGRESS NOTES
bladder/bowel incontinence? No    Did you have CT scan of the neck done? No    Did you have EMG/Nerve Conduction Study in the past? Yes    If so, where and when did you have the test? Yes [facility]   Did you hove any motor vehicle accident? No    Did you have any work related injury No    Do you use splints?  No

## 2019-04-10 DIAGNOSIS — E11.42 DIABETIC PERIPHERAL NEUROPATHY (HCC): ICD-10-CM

## 2019-04-10 RX ORDER — AMITRIPTYLINE HYDROCHLORIDE 25 MG/1
TABLET, FILM COATED ORAL
Qty: 90 TABLET | Refills: 1 | Status: SHIPPED | OUTPATIENT
Start: 2019-04-10 | End: 2019-06-05 | Stop reason: SDUPTHER

## 2019-04-15 ENCOUNTER — OFFICE VISIT (OUTPATIENT)
Dept: FAMILY MEDICINE CLINIC | Age: 49
End: 2019-04-15
Payer: MEDICARE

## 2019-04-15 VITALS
BODY MASS INDEX: 37.57 KG/M2 | SYSTOLIC BLOOD PRESSURE: 128 MMHG | OXYGEN SATURATION: 98 % | WEIGHT: 277 LBS | HEART RATE: 105 BPM | DIASTOLIC BLOOD PRESSURE: 82 MMHG

## 2019-04-15 DIAGNOSIS — Z79.4 TYPE 2 DIABETES MELLITUS WITH HYPERGLYCEMIA, WITH LONG-TERM CURRENT USE OF INSULIN (HCC): Primary | ICD-10-CM

## 2019-04-15 DIAGNOSIS — E11.65 TYPE 2 DIABETES MELLITUS WITH HYPERGLYCEMIA, WITH LONG-TERM CURRENT USE OF INSULIN (HCC): Primary | ICD-10-CM

## 2019-04-15 DIAGNOSIS — Z13.220 SCREENING CHOLESTEROL LEVEL: ICD-10-CM

## 2019-04-15 LAB — HBA1C MFR BLD: 5.5 %

## 2019-04-15 PROCEDURE — G8427 DOCREV CUR MEDS BY ELIG CLIN: HCPCS | Performed by: NURSE PRACTITIONER

## 2019-04-15 PROCEDURE — 99213 OFFICE O/P EST LOW 20 MIN: CPT | Performed by: NURSE PRACTITIONER

## 2019-04-15 PROCEDURE — 1036F TOBACCO NON-USER: CPT | Performed by: NURSE PRACTITIONER

## 2019-04-15 PROCEDURE — 2022F DILAT RTA XM EVC RTNOPTHY: CPT | Performed by: NURSE PRACTITIONER

## 2019-04-15 PROCEDURE — 3017F COLORECTAL CA SCREEN DOC REV: CPT | Performed by: NURSE PRACTITIONER

## 2019-04-15 PROCEDURE — 83036 HEMOGLOBIN GLYCOSYLATED A1C: CPT | Performed by: NURSE PRACTITIONER

## 2019-04-15 PROCEDURE — 3044F HG A1C LEVEL LT 7.0%: CPT | Performed by: NURSE PRACTITIONER

## 2019-04-15 PROCEDURE — G8417 CALC BMI ABV UP PARAM F/U: HCPCS | Performed by: NURSE PRACTITIONER

## 2019-04-15 ASSESSMENT — PATIENT HEALTH QUESTIONNAIRE - PHQ9
SUM OF ALL RESPONSES TO PHQ QUESTIONS 1-9: 1
SUM OF ALL RESPONSES TO PHQ QUESTIONS 1-9: 1
1. LITTLE INTEREST OR PLEASURE IN DOING THINGS: 0
SUM OF ALL RESPONSES TO PHQ9 QUESTIONS 1 & 2: 1
2. FEELING DOWN, DEPRESSED OR HOPELESS: 1

## 2019-04-15 NOTE — PROGRESS NOTES
Visit Information    Have you changed or started any medications since your last visit including any over-the-counter medicines, vitamins, or herbal medicines? no   Have you stopped taking any of your medications? Is so, why? -  no  Are you having any side effects from any of your medications? - no    Have you seen any other physician or provider since your last visit? yes - Neurology and Podiatry   Have you had any other diagnostic tests since your last visit?  no   Have you been seen in the emergency room and/or had an admission in a hospital since we last saw you?  no   Have you had your routine dental cleaning in the past 6 months? Do you have an active MyChart account? If no, what is the barrier?   No: declined    Patient Care Team:  BRENNON Aguillon - CNP as PCP - General (Nurse Practitioner)  Estella Thurman DPM as Physician (Podiatry)    Medical History Review  Past Medical, Family, and Social History reviewed and contribute to the patient presenting condition    Health Maintenance   Topic Date Due    Diabetic retinal exam  02/16/1979    Lipid screen  02/16/1979    HIV screen  02/16/1984    Hepatitis B Vaccine (1 of 3 - Risk 3-dose series) 02/16/1988    Shingles Vaccine (1 of 2) 02/16/2019    Colon cancer screen colonoscopy  02/16/2019    A1C test (Diabetic or Prediabetic)  01/16/2020    Diabetic microalbuminuria test  01/16/2020    Diabetic foot exam  03/20/2020    DTaP/Tdap/Td vaccine (2 - Td) 01/16/2029    Flu vaccine  Completed    Pneumococcal 0-64 years Vaccine  Completed

## 2019-04-19 ENCOUNTER — TELEPHONE (OUTPATIENT)
Dept: FAMILY MEDICINE CLINIC | Age: 49
End: 2019-04-19

## 2019-04-19 DIAGNOSIS — E11.65 TYPE 2 DIABETES MELLITUS WITH HYPERGLYCEMIA, WITHOUT LONG-TERM CURRENT USE OF INSULIN (HCC): Primary | ICD-10-CM

## 2019-04-26 DIAGNOSIS — E11.51 TYPE II DIABETES MELLITUS WITH PERIPHERAL CIRCULATORY DISORDER (HCC): Primary | ICD-10-CM

## 2019-04-26 PROCEDURE — A5500 DIAB SHOE FOR DENSITY INSERT: HCPCS | Performed by: PODIATRIST

## 2019-04-26 PROCEDURE — A5513 MULTI DEN INSERT CUSTOM MOLD: HCPCS | Performed by: PODIATRIST

## 2019-04-29 NOTE — PROGRESS NOTES
Marquise Credit, FNP-C  [address]  [phone] [facility]. [address]  [facility], [facility]  [phone]  [phone]    Martha Latif is a 47 y.o. male who is here with c/o of:    Chief Complaint: Diabetes      Patient Accompanied by: patient    HPI - Martha Latif is here today with c/o:    Diabetes Mellitus  Patient presents for follow up of diabetes. Current symptoms include: none. Symptoms have stabilized. Patient denies foot ulcerations, hypoglycemia , nausea, paresthesia of the feet, polydipsia, polyuria and visual disturbances. Evaluation to date has included: fasting blood sugar, fasting lipid panel, hemoglobin A1C and microalbuminuria. Home sugars: patient does not check sugars. Current treatment: none. Last dilated eye exam: 2018. Patient Active Problem List:     Diabetes mellitus ([address]     Hyperlipidemia     Hypertension     Lumbar radiculopathy     Obstructive sleep apnea syndrome     Restless legs syndrome     Severe obesity (BMI 35.0-39. 9) with comorbidity [facility])     Past Medical History:   Diagnosis Date    Hyperlipidemia     Hypertension     Kidney calculi     Lumbar radiculopathy 6/9/2017    Obstructive sleep apnea syndrome 6/9/2017    Restless legs syndrome 6/9/2017    Severe obesity (BMI 35.0-39. 9) with comorbidity ([facility] [address] 7/31/2018      Past Surgical History:   Procedure Laterality Date    ANKLE SURGERY Right     GLAUCOMA SURGERY Bilateral 12/2018    SHOULDER SURGERY Right      Family History   Problem Relation Age of Onset    High Cholesterol Mother     Other Mother     Depression Mother     Heart Disease Father     High Cholesterol Father     Alcohol Abuse Father      Social History     Tobacco Use    Smoking status: Former Smoker     Packs/day: 3.00     Years: 20.00     Pack years: 60.00     Types: E-Cigarettes    Smokeless tobacco: Never Used   Substance Use Topics    Alcohol use: No     Comment: occasionally     ALLERGIES:    Allergies   Allergen Reactions  Codeine     Corticosteroids     Ibuprofen           Subjective     · Constitutional:  Negative for activity change, appetite change,unexpected weight change, chills, fever, and fatigue. · HENT: Negative for ear pain, sore throat,  Rhinorrhea, sinus pain, sinus pressure, congestion. · Eyes:  Negative for pain and discharge. · Respiratory:  Negative for chest tightness, shortness of breath, wheezing, and cough. · Cardiovascular:  Negative for chest pain, palpitations and leg swelling. · Gastrointestinal: Negative for abdominal pain, blood in stool, constipation,diarrhea, nausea and vomiting. · Endocrine: Negative for cold intolerance, heat intolerance, polydipsia, polyphagia and polyuria. · Genitourinary: Negative for difficulty urinating, dysuria, flank pain, frequency, hematuria and urgency. · Musculoskeletal: Negative for arthralgias, back pain, joint swelling, myalgias, neck pain and neck stiffness. · Skin: Negative for rash and wound. · Allergic/Immunologic: Negative for environmental allergies and food allergies. · Neurological:  Negative for dizziness, light-headedness, numbness and headaches. · Hematological:  Negative for adenopathy. Does not bruise/bleed easily. · Psychiatric/Behavioral: Negative for self-injury, sleep disturbance and suicidal ideas. Objective     PHYSICAL EXAM:   · Constitutional: Irene Barnett is oriented to person, place, and time. Vital signs are normal. Appears well-developed and well-nourished. · HEENT:   · Head: Normocephalic and atraumatic. Right Ear: Hearing and external ear normal.     · Left Ear: Hearing and external ear normal.    · Nose: Nares normal   · Eyes:PERRL, EOMI, Conjunctiva normal, No discharge. · Neck: Full passive range of motion. Non-tender on palpation. Neck supple. No thyromegaly present. Trachea normal.  · Cardiovascular: Normal rate, regular rhythm, S1, S2, no murmur, no gallop, no friction rub, intact distal pulses. · Pulmonary/Chest: Breath sounds are clear throughout, No respiratory distress, No wheezing, No chest tenderness. Effort normal  · Abdominal: Soft. Normal appearance, bowel sounds are present and normoactive. There is no hepatosplenomegaly. There is no tenderness. There is no CVA tenderness. · Musculoskeletal: Extremities appear regular and symmetric. No evident masses, lesions, foreign bodies, or other abnormalities. No edema. No tenderness on palpation. Joints are stable. Full ROM, strength and tone are within normal limits. · Lymphadenopathy: No lymphadenopathy noted. · Neurological: Alert and oriented to person, place, and time. Normal motor function, Normal sensory function, No focal deficits noted. He has normal strength. · Skin: Skin is warm, dry and intact. No obvious lesions on exposed skin  · Psychiatric: Normal mood and affect. Speech is normal and behavior is normal.       Nursing note and vitals reviewed. Blood pressure 128/82, pulse 105, weight 277 lb (125.6 kg), SpO2 98 %. Body mass index is 37.57 kg/m². Wt Readings from Last 3 Encounters:   04/15/19 277 lb (125.6 kg)   03/20/19 286 lb (129.7 kg)   03/05/19 284 lb 3.2 oz (128.9 kg)     BP Readings from Last 3 Encounters:   04/15/19 128/82   03/05/19 (!) 163/112   01/29/19 136/86       No results found for this visit on 04/15/19. Completed Orders/Prescriptions   Orders Placed This Encounter   Medications    DISCONTD: Dulaglutide 1.5 MG/0.5ML SOPN     Sig: Inject 1.5 mg into the skin once a week     Dispense:  3 pen     Refill:  6          Lab Results   Component Value Date    LABA1C 5.5 04/15/2019    LABA1C 5.6 01/16/2019    LABA1C 5.5 10/18/2018     Lab Results   Component Value Date    CREATININE 0.76 02/02/2018             AssessmentPlan/Medical Decision Making     1.  Type 2 diabetes mellitus with hyperglycemia, with long-term current use of insulin (HCC)  - A1C very well controlled  - Discontinue Victoza as insurance does not

## 2019-05-15 ENCOUNTER — OFFICE VISIT (OUTPATIENT)
Dept: FAMILY MEDICINE CLINIC | Age: 49
End: 2019-05-15
Payer: MEDICARE

## 2019-05-15 VITALS
SYSTOLIC BLOOD PRESSURE: 134 MMHG | BODY MASS INDEX: 36.75 KG/M2 | WEIGHT: 271 LBS | HEART RATE: 92 BPM | DIASTOLIC BLOOD PRESSURE: 82 MMHG | OXYGEN SATURATION: 98 %

## 2019-05-15 DIAGNOSIS — E11.65 TYPE 2 DIABETES MELLITUS WITH HYPERGLYCEMIA, WITHOUT LONG-TERM CURRENT USE OF INSULIN (HCC): Primary | ICD-10-CM

## 2019-05-15 DIAGNOSIS — L91.8 SKIN TAGS, MULTIPLE ACQUIRED: ICD-10-CM

## 2019-05-15 LAB — HBA1C MFR BLD: 5.5 %

## 2019-05-15 PROCEDURE — G8417 CALC BMI ABV UP PARAM F/U: HCPCS | Performed by: NURSE PRACTITIONER

## 2019-05-15 PROCEDURE — 99214 OFFICE O/P EST MOD 30 MIN: CPT | Performed by: NURSE PRACTITIONER

## 2019-05-15 PROCEDURE — 1036F TOBACCO NON-USER: CPT | Performed by: NURSE PRACTITIONER

## 2019-05-15 PROCEDURE — 2022F DILAT RTA XM EVC RTNOPTHY: CPT | Performed by: NURSE PRACTITIONER

## 2019-05-15 PROCEDURE — 83036 HEMOGLOBIN GLYCOSYLATED A1C: CPT | Performed by: NURSE PRACTITIONER

## 2019-05-15 PROCEDURE — 11200 RMVL SKIN TAGS UP TO&INC 15: CPT | Performed by: NURSE PRACTITIONER

## 2019-05-15 PROCEDURE — G8427 DOCREV CUR MEDS BY ELIG CLIN: HCPCS | Performed by: NURSE PRACTITIONER

## 2019-05-15 PROCEDURE — 3044F HG A1C LEVEL LT 7.0%: CPT | Performed by: NURSE PRACTITIONER

## 2019-05-15 PROCEDURE — 3017F COLORECTAL CA SCREEN DOC REV: CPT | Performed by: NURSE PRACTITIONER

## 2019-05-15 NOTE — PROGRESS NOTES
Visit Information    Have you changed or started any medications since your last visit including any over-the-counter medicines, vitamins, or herbal medicines? no   Are you having any side effects from any of your medications? -  no  Have you stopped taking any of your medications? Is so, why? -  no    Have you seen any other physician or provider since your last visit? No  Have you had any other diagnostic tests since your last visit? No  Have you been seen in the emergency room and/or had an admission to a hospital since we last saw you? No  Have you had your routine dental cleaning in the past 6 months? yes -     Have you activated your [employer] account? If not, what are your barriers?  Yes     Patient Care Team:  BRENNON Hernandez CNP as PCP - General (Nurse Practitioner)  Luz Marina Stock DPM as Physician (Podiatry)    Medical History Review  Past Medical, Family, and Social History reviewed and does not contribute to the patient presenting condition    Health Maintenance   Topic Date Due    Diabetic retinal exam  02/16/1979    Lipid screen  02/16/1979    HIV screen  02/16/1984    Hepatitis B Vaccine (1 of 3 - Risk 3-dose series) 02/16/1988    Shingles Vaccine (1 of 2) 02/16/2019    Colon cancer screen colonoscopy  02/16/2019    Diabetic microalbuminuria test  01/16/2020    Diabetic foot exam  03/20/2020    A1C test (Diabetic or Prediabetic)  04/15/2020    DTaP/Tdap/Td vaccine (2 - Td) 01/16/2029    Flu vaccine  Completed    Pneumococcal 0-64 years Vaccine  Completed

## 2019-05-15 NOTE — PROGRESS NOTES
Yanely Landeros, FNP-C  [address]  [phone] [facility]. [facility]  [address]  [phone]  [phone]    Racheal Rowland is a 47 y.o. male who is here with c/o of:    Chief Complaint: Diabetes (1 month check up )      Patient Accompanied by: wife    HPI - Racheal Rowland is here today with c/o:    Diabetes Mellitus Type 2: Current symptoms/problems include neuropathy. Medication compliance:  compliant all of the time  Diabetic diet compliance:  compliant all of the time,  Weight trend: decreasing  Current exercise: increased activity   Barriers: none    Home blood sugar records: fasting range: 120's  Any episodes of hypoglycemia? yes - patient has not checked blood during these times, but reports feeling shaky - reports eating during these episodes and it resolves  Eye exam current (within one year): yes   reports that he has quit smoking. His smoking use included e-cigarettes. He has a 60.00 pack-year smoking history. He has never used smokeless tobacco.   Daily Aspirin? Yes    Lab Results   Component Value Date    LABA1C 5.5 05/15/2019    LABA1C 5.5 04/15/2019    LABA1C 5.6 01/16/2019     Lab Results   Component Value Date    CREATININE 0.76 02/02/2018     No results found for: ALT, AST  No results found for: CHOL, TRIG, HDL, LDLCALC, LDLDIRECT     Skin Tags  Patient complains of skin tags. Lesions are located primarily on the axillary region. The patient wishes skin tag(s) removed as the lesion(s) is/are getting caught on clothing and/or jewelry and recurrently irritated. Patient Active Problem List:     Diabetes mellitus ([address]     Hyperlipidemia     Hypertension     Lumbar radiculopathy     Obstructive sleep apnea syndrome     Restless legs syndrome     Severe obesity (BMI 35.0-39. 9) with comorbidity [facility])     Past Medical History:   Diagnosis Date    Hyperlipidemia     Hypertension     Kidney calculi     Lumbar radiculopathy 6/9/2017    Obstructive sleep apnea syndrome have been caught on clothing and pulled  · Psychiatric: Normal mood and affect. Speech is normal and behavior is normal.       Nursing note and vitals reviewed. Blood pressure 134/82, pulse 92, weight 271 lb (122.9 kg), SpO2 98 %. Body mass index is 36.75 kg/m². Wt Readings from Last 3 Encounters:   05/15/19 271 lb (122.9 kg)   04/15/19 277 lb (125.6 kg)   03/20/19 286 lb (129.7 kg)     BP Readings from Last 3 Encounters:   05/15/19 134/82   04/15/19 128/82   03/05/19 (!) 163/112       Results for POC orders placed in visit on 05/15/19   POCT glycosylated hemoglobin (Hb A1C)   Result Value Ref Range    Hemoglobin A1C 5.5 %       Completed Orders/Prescriptions   Orders Placed This Encounter   Medications    Semaglutide 1 MG/DOSE SOPN     Sig: Inject 1 mg into the skin once a week     Dispense:  3 pen     Refill:  6     Please note dose adjustment     Skin Tag Removal Procedure Note    Pre-operative Diagnosis: Classic skin tags (acrochordon)    Post-operative Diagnosis: Classic skin tags (acrochordon)    Locations:bilateral axila    Indications: bleeding, painful, getting caught on clothing    Anesthesia: not required     Procedure Details   The risks (including bleeding and infection) and benefits of the procedure and Verbal informed consent obtained. Using liquid nitrogen, multiple skin tags were frozen. There was no bleeding and mild discomfort noted. Findings:  Pathognomonic benign lesions  not sent for pathological exam.    Condition:  Stable    Complications:  none. Plan:  1. Instructed to keep the wounds dry and covered for 24-48h and clean thereafter. 2. Warning signs of infection were reviewed. 3. Recommended that the patient use OTC acetaminophen, OTC ibuprofen as needed for pain. 4. Return as needed. AssessmentPlan/Medical Decision Making     1.  Type 2 diabetes mellitus with hyperglycemia, without long-term current use of insulin (HCC)  - continue Basaglar 30 units nightly  - Continue humalog sliding scale  - Continue semaglutide (Ozempic) 1mg weekly  - POCT glycosylated hemoglobin (Hb A1C)    2. Skin tags, multiple acquired  - skin care reviewed  - [address] - [place] REMOVAL OF SKIN TAGS, UP TO 15      Return in about 3 months (around 8/15/2019) for DM II. 1.  Sarah Roblero received counseling on the following healthy behaviors: nutrition, exercise and medication adherence  2. Patient given educational materials - see patient instructions  3. Was a self-tracking handout given in paper form or via [employer]? No  If yes, see orders or list here. 4.  Discussed use, benefit, and side effects of prescribed medications. Barriers to medication compliance addressed. All patient questions answered. Pt voiced understanding. 5.  Reviewed prior labs, imaging, consultation, follow up, and health maintenance  6. Continue current medications, diet and exercise. 7. Discussed use, benefit, and side effects of prescribed medications. Barriers to medication compliance addressed. All her questions were answered. Pt voiced understanding. Sarah Roblero will continue current medications, diet and exercise. Patient given educational materials on skin tags    Of the 25 minute duration appointment visit, Fara Lowery CNP spent at least 50% of the face-to-face time in counseling, explanation of diagnosis, planning of further management, and answering all questions. Signed:  Fara Lowery CNP    This note is created with the assistance of a speech-recognition program.  While intending to generate a document that actually reflects the content of the visit, no guarantees can be provided that every mistake has been identified and corrected by editing.

## 2019-05-22 ENCOUNTER — OFFICE VISIT (OUTPATIENT)
Dept: PODIATRY | Age: 49
End: 2019-05-22
Payer: MEDICARE

## 2019-05-22 VITALS — BODY MASS INDEX: 34.59 KG/M2 | WEIGHT: 261 LBS | RESPIRATION RATE: 16 BRPM | HEIGHT: 73 IN

## 2019-05-22 DIAGNOSIS — L98.8 MACERATION OF SKIN: ICD-10-CM

## 2019-05-22 DIAGNOSIS — M79.671 BILATERAL FOOT PAIN: ICD-10-CM

## 2019-05-22 DIAGNOSIS — I73.9 PERIPHERAL VASCULAR DISORDER (HCC): ICD-10-CM

## 2019-05-22 DIAGNOSIS — E11.51 TYPE II DIABETES MELLITUS WITH PERIPHERAL CIRCULATORY DISORDER (HCC): Primary | ICD-10-CM

## 2019-05-22 DIAGNOSIS — M79.672 BILATERAL FOOT PAIN: ICD-10-CM

## 2019-05-22 DIAGNOSIS — B35.1 DERMATOPHYTOSIS OF NAIL: ICD-10-CM

## 2019-05-22 PROCEDURE — 11721 DEBRIDE NAIL 6 OR MORE: CPT | Performed by: PODIATRIST

## 2019-05-22 PROCEDURE — 2022F DILAT RTA XM EVC RTNOPTHY: CPT | Performed by: PODIATRIST

## 2019-05-22 PROCEDURE — 3017F COLORECTAL CA SCREEN DOC REV: CPT | Performed by: PODIATRIST

## 2019-05-22 PROCEDURE — G8417 CALC BMI ABV UP PARAM F/U: HCPCS | Performed by: PODIATRIST

## 2019-05-22 PROCEDURE — 1036F TOBACCO NON-USER: CPT | Performed by: PODIATRIST

## 2019-05-22 PROCEDURE — G8427 DOCREV CUR MEDS BY ELIG CLIN: HCPCS | Performed by: PODIATRIST

## 2019-05-22 PROCEDURE — 3044F HG A1C LEVEL LT 7.0%: CPT | Performed by: PODIATRIST

## 2019-05-22 NOTE — PROGRESS NOTES
UNITS UNDER THE SKIN THREE TIMES DAILY BEFORE MEALS PER SLIDING SCALE 90 mL 11    [employer] (SIMPLE DIAGNOSTICS LANCING DEV) MISC USE TO TEST BLOOD SUGAR 4 TIMES A DAY 1 each 3    TRUE [employer] BLOOD GLUCOSE TEST strip TEST BLOOD SUGAR 3 TO 4 TIMES DAILY 100 each 1    PHARMACIST CHOICE LANCETS MISC USE TO TEST BLOOD SUGAR 4 TIMES A  each 3    PHARMACIST CHOICE ALCOHOL 70 % PADS USE BEFORE TESTING SUGAR (4 TIMES) PLUS BEFORE USING INSULIN (5 TIMES A DAY) 270 each 11    COMFORT EZ PEN NEEDLES 32G X 4 MM MISC USE 5 TIMES A DAY (CHANGE NEEDLE WITH INSULIN EACH TIME USE OF VICTOZA, BASAGLAR AND WITH HUMALOG) 150 each 11    benzoyl peroxide 5 % external liquid Wash face, chest and back 1-2 times daily 227 g 3    omeprazole (PRILOSEC) 20 MG delayed release capsule Take 1 capsule by mouth every morning (before breakfast) 30 capsule 5    amitriptyline (ELAVIL) 25 MG tablet TAKE 1.5 TAB NIGHTLY FOR ONE WEEK AND THEN 2 TAB NIGHTLY 60 tablet 0    nystatin (MYCOSTATIN) 345653 UNIT/GM cream Apply topically 2 times daily. 15 g 3     No current facility-administered medications on file prior to visit. Review of Systems    Review of Systems:   History obtained from chart review and the patient  General ROS: negative for - chills, fatigue, fever, night sweats or weight gain  Constitutional: Negative for chills, diaphoresis, fatigue, fever and unexpected weight change. Musculoskeletal: Positive for arthralgias, gait problem and joint swelling. Neurological ROS: negative for - behavioral changes, confusion, headaches or seizures. Negative for weakness and numbness. Dermatological ROS: negative for - mole changes, rash  Cardiovascular: Negative for leg swelling. Gastrointestinal: Negative for constipation, diarrhea, nausea and vomiting. Objective:  General: AAO x 3 in NAD.     Derm  Toenail Description  Sites of Onychomycosis Involvement (Check affected area)  [x] [x] [x] [x] [x] [x] [x] [x] [x] [x]  5 4 3 2 1 1 2 3 4 5                          Right                                        Left    Thickness  [x] [x] [x] [x] [x] [x] [x] [x] [x] [x]  5 4 3 2 1 1 2 3 4 5                         Right                                        Left    Dystrophic Changes   [x] [x] [x] [x] [x] [x] [x] [x] [x] [x]  5 4 3 2 1 1 2 3 4 5                         Right                                        Left    Color   [x] [x] [x] [x] [x] [x] [x] [x] [x] [x]  5 4 3 2 1 1 2 3 4 5                          Right                                        Left    Incurvation/Ingrowin   [] [] [] [] [] [] [] [] [] []  5 4 3 2 1 1 2 3 4 5                         Right                                        Left    Inflammation/Pain   [x] [x] [x] [x] [x] [x] [x] [x] [x] [x]  5 4 3 2 1 1 2 3 4 5                         Right                                        Left      Dermatologic Exam:  Skin lesion/ulceration Absent . Skin No rashes or nodules noted. .       Musculoskeletal:     1st MPJ ROM decreased, Bilateral.  Muscle strength 5/5, Bilateral.  Pain present upon palpation of toenails 1-5, Bilateral. decreased medial longitudinal arch, Bilateral.  Ankle ROM decreased,Bilateral.    Dorsally contracted digits present digits 2, Bilateral.     Vascular: DP and PT pulses palpable 1/4, Bilateral.  CFT <5 seconds, Bilateral.  Hair growth absent to the level of the digits, Bilateral.  Edema present, Bilateral.  Varicosities absent, Bilateral. Erythema absent, Bilateral    Neurological: Sensation diminshed to light touch to level of digits, Bilateral.  Protective sensation intact 6/10 sites via 5.07/10g [place] Monofilament, Bilateral.  negative Tinel's, Bilateral.  negative Valleix sign, Bilateral.      Integument: Warm, dry, supple, Bilateral.  Open lesion absent, Bilateral.  Interdigital maceration absent to web spaces 4, Bilateral.  Nails 1-5 left and 1-5 right thickened > 3.0 mm, dystrophic and crumbly, discolored with subungual debris. Fissures absent, Bilateral.     Visual inspection:  Deformity: hammertoe deformity digna feet  amputation: absent  Skin lesions: absent  Edema: right- 2+ pitting edema, left- 2+ pitting edema    Sensory exam:  Monofilament sensation: abnormal - 6/10 via SW 5.07/10g monofilament to the plantar foot bilateral feet    Pulses: abnormal - 1/4 dorsalis pedis pulse and 1/4 Posterior tibial pulse,   Pinprick: Impaired  Proprioception: Impaired  Vibration (128 Hz): Impaired       DM with PVD       [x]Yes    []No      Assessment:  47 y.o. male with:  1. Type II diabetes mellitus with peripheral circulatory disorder (HCC)    2. Dermatophytosis of nail    3. Peripheral vascular disorder ([address]    4. Bilateral foot pain     No orders of the defined types were placed in this encounter. Q7   []Yes    []No                Q8   [x]Yes    []No                     Q9   []Yes    []No    Plan:   Pt was evaluated and examined. Patient was given personalized discharge instructions. Nails 1-10 were debrided sharply in length and thickness with a nipper and , without incident. Pt will follow up in 2 months or sooner if any problems arise. Diagnosis was discussed with the pt and all of their questions were answered in detail. Proper foot hygiene and care was discussed with the pt. Informed patient on proper diabetic foot care and importance of tight glycemic control. Patient to check feet daily and contact the office with any questions/problems/concerns.    Other comorbidity noted and will be managed by PCP.  5/22/2019    Electronically signed by Theresa Chavez DPM on 5/22/2019 at 1:59 PM  5/22/2019

## 2019-05-29 ENCOUNTER — OFFICE VISIT (OUTPATIENT)
Dept: NEUROLOGY | Age: 49
End: 2019-05-29
Payer: MEDICARE

## 2019-05-29 VITALS
SYSTOLIC BLOOD PRESSURE: 159 MMHG | BODY MASS INDEX: 36.58 KG/M2 | HEART RATE: 98 BPM | HEIGHT: 73 IN | DIASTOLIC BLOOD PRESSURE: 92 MMHG | WEIGHT: 276 LBS

## 2019-05-29 DIAGNOSIS — R20.0 NUMBNESS AND TINGLING IN BOTH HANDS: Primary | ICD-10-CM

## 2019-05-29 DIAGNOSIS — R20.2 NUMBNESS AND TINGLING IN BOTH HANDS: Primary | ICD-10-CM

## 2019-05-29 DIAGNOSIS — G56.03 BILATERAL CARPAL TUNNEL SYNDROME: ICD-10-CM

## 2019-05-29 DIAGNOSIS — E11.42 DIABETIC PERIPHERAL NEUROPATHY (HCC): ICD-10-CM

## 2019-05-29 DIAGNOSIS — G25.81 RESTLESS LEGS SYNDROME: ICD-10-CM

## 2019-05-29 DIAGNOSIS — G56.21 ULNAR NEUROPATHY OF RIGHT UPPER EXTREMITY: ICD-10-CM

## 2019-05-29 DIAGNOSIS — M62.838 MUSCLE SPASM OF BOTH LOWER LEGS: ICD-10-CM

## 2019-05-29 PROCEDURE — 99214 OFFICE O/P EST MOD 30 MIN: CPT | Performed by: PSYCHIATRY & NEUROLOGY

## 2019-05-29 PROCEDURE — 2022F DILAT RTA XM EVC RTNOPTHY: CPT | Performed by: PSYCHIATRY & NEUROLOGY

## 2019-05-29 PROCEDURE — 3017F COLORECTAL CA SCREEN DOC REV: CPT | Performed by: PSYCHIATRY & NEUROLOGY

## 2019-05-29 PROCEDURE — 3044F HG A1C LEVEL LT 7.0%: CPT | Performed by: PSYCHIATRY & NEUROLOGY

## 2019-05-29 PROCEDURE — G8417 CALC BMI ABV UP PARAM F/U: HCPCS | Performed by: PSYCHIATRY & NEUROLOGY

## 2019-05-29 PROCEDURE — G8427 DOCREV CUR MEDS BY ELIG CLIN: HCPCS | Performed by: PSYCHIATRY & NEUROLOGY

## 2019-05-29 PROCEDURE — 1036F TOBACCO NON-USER: CPT | Performed by: PSYCHIATRY & NEUROLOGY

## 2019-05-29 RX ORDER — CAPSAICIN 0.07 G/100G
CREAM TOPICAL
Qty: 1 TUBE | Refills: 1 | Status: SHIPPED | OUTPATIENT
Start: 2019-05-29 | End: 2019-06-28

## 2019-05-29 NOTE — PROGRESS NOTES
NEUROLOGY FOLLOW UP VISIT   Patient Name:       Elaina Osgood  :        1970  Clinic Visit Date:    2019    Dear Dr. Charles Chowdhury, APRN - CNP     I saw Mr. Elaina Osgood in follow-up in the office today in continuation of neurologic care. As you know he  is a 47 y.o. right handed  male with diabetic peripheral polyneuropathy superimposed with bilateral carpal tunnel syndrome. Today he comes to clinic after EMG testing done on 19 as requested by hand surgeon, Dr. Sarah Greer. Patient stated that he still has abnormal sensations with the numbness and tingling in fingers and pain in right elbow along with stinging sensation and cramps in both calves. He also has been having restless legs with intermittent myoclonic jerks through the night. He also has been having significant gait difficulties with \"occasional right leg giving out\". He has ongoing dysesthesias in bilateral lower extremities with marginal relief with amitriptyline. Increasing the dose of amitriptyline is making him lethargic. He is taking 50 mg every night. He was borderline diabetic for > 3 years. He is on multiple diabetic medications including insulin, Victoza, etc.  His blood sugars had been under control but his symptoms had been getting worse. He has had NCS/EMG testing of lower extremities and he was told to have neuropathy. He was on gabapentin 300 mg 3 times daily and it has caused increased sleepiness and he stopped taking it. He also tried Lyrica and Cymbalta with no help. He has been having stinging sensation along with tingling and numbness in lower legs and feet. He also has been having painful sensations in both hands and those are described as clue-like sensation in medial aspect of both forearms and last 2 digits. He has been having trouble walking with increased pain upon walking. He denies radiating pain and paresthesias across the lower back. Denies bladder and bowel incontinence.   He has been having extreme difficulty walking with unsteady gait with progressive pain in bilateral lower extremities. Review of systems done by staff reviewed and pertinent positives include numbness, weakness, spasms, pain in both legs, unsteady gait, back pain and restless legs as stated above. Current Outpatient Medications on File Prior to Visit   Medication Sig Dispense Refill    gentian violet 1 % topical solution Apply 0.5 mLs topically daily 1 Bottle 2    Semaglutide 1 MG/DOSE SOPN Inject 1 mg into the skin once a week 3 pen 6    amitriptyline (ELAVIL) 25 MG tablet Take 3 tab nightly. 90 tablet 1    ammonium lactate (LAC-HYDRIN) 12 % cream Apply topically as needed.  1 Bottle 4    BASAGLAR KWIKPEN 100 UNIT/ML injection pen INJECT 70 UNITS SUBCUTANEOUSLY ONCE NIGHTLY (Patient taking differently: Inject 30 units daily) 60 mL 11    insulin lispro (HUMALOG KWIKPEN) 100 UNIT/ML pen INJECT 1 TO 14 UNITS UNDER THE SKIN THREE TIMES DAILY BEFORE MEALS PER SLIDING SCALE 90 mL 11    Lancet Devices (SIMPLE DIAGNOSTICS LANCING DEV) MISC USE TO TEST BLOOD SUGAR 4 TIMES A DAY 1 each 3    TRUE METRIX BLOOD GLUCOSE TEST strip TEST BLOOD SUGAR 3 TO 4 TIMES DAILY 100 each 1    PHARMACIST CHOICE LANCETS MISC USE TO TEST BLOOD SUGAR 4 TIMES A  each 3    PHARMACIST CHOICE ALCOHOL 70 % PADS USE BEFORE TESTING SUGAR (4 TIMES) PLUS BEFORE USING INSULIN (5 TIMES A DAY) 270 each 11    COMFORT EZ PEN NEEDLES 32G X 4 MM MISC USE 5 TIMES A DAY (CHANGE NEEDLE WITH INSULIN EACH TIME USE OF VICTOZA, BASAGLAR AND WITH HUMALOG) 150 each 11    benzoyl peroxide 5 % external liquid Wash face, chest and back 1-2 times daily 227 g 3    omeprazole (PRILOSEC) 20 MG delayed release capsule Take 1 capsule by mouth every morning (before breakfast) 30 capsule 5    amitriptyline (ELAVIL) 25 MG tablet TAKE 1.5 TAB NIGHTLY FOR ONE WEEK AND THEN 2 TAB NIGHTLY 60 tablet 0    nystatin (MYCOSTATIN) 723412 UNIT/GM cream Apply topically 2 times daily. 15 g 3     No current facility-administered medications on file prior to visit. Allergies: Humberto Huitron is allergic to codeine; corticosteroids; and ibuprofen. Past Medical History:   Diagnosis Date    Hyperlipidemia     Hypertension     Kidney calculi     Lumbar radiculopathy 6/9/2017    Obstructive sleep apnea syndrome 6/9/2017    Restless legs syndrome 6/9/2017    Severe obesity (BMI 35.0-39. 9) with comorbidity ([address] 7/31/2018       Past Surgical History:   Procedure Laterality Date    ANKLE SURGERY Right     GLAUCOMA SURGERY Bilateral 12/2018    SHOULDER SURGERY Right      Social History: Humberto Huitron  reports that he has quit smoking. His smoking use included e-cigarettes. He has a 60.00 pack-year smoking history. He has never used smokeless tobacco. He reports that he does not drink alcohol or use drugs. Family History   Problem Relation Age of Onset    High Cholesterol Mother     Other Mother     Depression Mother     Heart Disease Father     High Cholesterol Father     Alcohol Abuse Father        On exam: Blood pressure (!) 159/92, pulse 98, height 6' 1\" (1.854 m), weight 276 lb (125.2 kg). GENERAL  Appears comfortable and in no distress   HEENT  NC/ AT   NECK  Supple and no bruits heard   MENTAL STATUS:  Alert, oriented, intact memory, no confusion, normal speech, normal language, no hallucination or delusion   CRANIAL NERVES: II     -      PERRLA, Fundi reveal intact venous pulsations;  Visual fields intact to confrontation  III,IV,VI -  EOMs full, no afferent defect, no                      KRISTINE, no ptosis  V     -     Normal facial sensation  VII    -     Normal facial symmetry  VIII   -     Intact hearing  IX,X -     Symmetrical palate  XI    -     Symmetrical shoulder shrug  XII   -     Midline tongue, no atrophy    MOTOR FUNCTION:  significant for good strength of grade 5/5 in bilateral proximal and distal muscle groups of both upper and lower extremities with normal bulk, normal tone and no involuntary movements, no tremor   SENSORY FUNCTION:  Impaired light touch, pinprick and temperature in distal lower extremities bilaterally. CEREBELLAR FUNCTION:  Intact fine motor control over upper limbs   REFLEX FUNCTION:  Symmetric, 1+ DTRs at both kneed and diminished ankle jerks bilaterally   STATION and GAIT  Normal station, Wide-based gait; could not do tandem gait; +ve romberg sign       Diagnostic data reviewed with the patient:   NCS/EMG of bilateral upper and lower extremities 5/29/18:    Performed by Dr. Yolande Dykes:   Abnormal study; electrophysiologic evidence of mild bilateral carpal tunnel syndrome. EMG of Bilateral UE (4/4/19):   Electrodiagnostic Interpretation:   There is electrophysiologic evidence of ulnar neuropathy at right elbow with demyelinating and axonal features. Supporting features include low ulnar snap, prolonged ulnar cmap with slowing of greater than 10-11 m/s across the elbow segment compared with the forearm segment. This study also demonstrated electrophysiologic evidence of bilateral median neuropathy (bilateral CTS)  of mild-moderate severity.            Impression and Plan: Mr. Jeannette Oliveira is a 47 y.o. male with   Entrapment neuropathy; right ulnar neuropathy superimposed on bilateral Internal syndrome: Has upcoming appointment with hand surgeon. Restless leg syndrome sec to diabetic neuropathy; periodic leg movements in sleep; will start him on Sinemet at low-dose with gradual dose escalation. Three- four year hx of Progressive dysesthesias involving bilateral lower extremities extending to upper extremities  Gait difficulties secondary to above    Failed gabapentin, Lyrica and duloxetine  Could not tolerate higher dose of amitriptyline; advised to continue amitriptyline 50mg nightly to help for dysesthesias.   Will start him on capsaicin cream to help for lower legs dysesthesias and also to start Sinemet to help for restless legs/PLMS  Will also refer to occupational therapy to help for entrapment neuropathy symptomatology in bilateral upper extremities. Fall precautions discussed at length. He was referred to PT for gait strengthening. He was advised aqua therapy and he is waiting for insurance coverage. For muscle spasms; he will continue tizanidine 2 mg every evening. Follow-up in 3-4 months or sooner for further questions and concerns. Greater than 50% of visit was spent explaining the rationale for diagnosis and treatment. Please note that portions of this note were completed with a voice recognition program.  Although every effort was made to ensure the accuracy of this  automated transcription, some errors in transcription may have occurred, occasionally words are mis-transcribed.

## 2019-05-29 NOTE — PROGRESS NOTES
NEUROLOGY FOLLOW-UP  Patient Name:  Hayde Mccloud  :   1970  Clinic Visit Date: 2019        REVIEW OF SYSTEMS  Constitutional Weight changes: present, Fevers : absent, Fatigue: present; Any recent hospitalizations:  absent.    HEENT Ears: normal, Eyes: glasses, Visual disturbance: absent   Reespiratory Shortness of breath: absent, Cough: absent   Cardivascular Chest pain: absent, Leg swelling :present   GI Constipation: absent, Diarrhea: absent, Swallowing change: absent    Urinary frequency: absent, Urinary urgency: absent, Urinary incontinence: absent   Musculoskeletal Neck pain: absent, Back pain: absent, Stiffness: absent, Muscle pain: present, Joint pain: absent Restless Legs: present   Dermatological Hair loss: absent, Skin changes: absent   Neurological Memory loss: absent, Confusion: absent, Seizures: absent; Trouble walking or imbalance: present, Dizziness: absent, Weakness: absent, Numbness absent, Tremor: absent, Spasm: absent, Speech difficulty: absent, Headache: absent, Light sensitivity: absent   Psychiatric Anxiety: absent, Depression  absent, Suicidal ideations absent   Hematologic Abnormal bleeding: absent, Anemia: absent, Clotting disorder: absent, Lymph gland changes: absent

## 2019-06-05 DIAGNOSIS — E11.42 DIABETIC PERIPHERAL NEUROPATHY (HCC): ICD-10-CM

## 2019-06-05 RX ORDER — AMITRIPTYLINE HYDROCHLORIDE 25 MG/1
TABLET, FILM COATED ORAL
Qty: 60 TABLET | Refills: 3 | Status: SHIPPED | OUTPATIENT
Start: 2019-06-05 | End: 2019-09-05

## 2019-06-18 NOTE — TELEPHONE ENCOUNTER
Future Appointments   Date Time Provider Bryson Swartz   7/24/2019  1:30 PM Kaleb Washington DPM [facility]   8/15/2019  1:00 PM Damaris Bonds, APRN - CNP [facility]   9/5/2019 10:30 AM Cheri Frazier MD  derm [facility]   9/5/2019  2:00 PM Dariel Gerber MD [facility]

## 2019-07-24 ENCOUNTER — OFFICE VISIT (OUTPATIENT)
Dept: PODIATRY | Age: 49
End: 2019-07-24
Payer: MEDICARE

## 2019-07-24 VITALS — BODY MASS INDEX: 35.42 KG/M2 | HEIGHT: 74 IN | WEIGHT: 276 LBS | RESPIRATION RATE: 16 BRPM

## 2019-07-24 DIAGNOSIS — M79.671 BILATERAL FOOT PAIN: ICD-10-CM

## 2019-07-24 DIAGNOSIS — E11.51 TYPE II DIABETES MELLITUS WITH PERIPHERAL CIRCULATORY DISORDER (HCC): Primary | ICD-10-CM

## 2019-07-24 DIAGNOSIS — B35.3 TINEA PEDIS OF BOTH FEET: ICD-10-CM

## 2019-07-24 DIAGNOSIS — I73.9 PERIPHERAL VASCULAR DISORDER (HCC): ICD-10-CM

## 2019-07-24 DIAGNOSIS — B35.1 DERMATOPHYTOSIS OF NAIL: ICD-10-CM

## 2019-07-24 DIAGNOSIS — L98.8 MACERATION OF SKIN: ICD-10-CM

## 2019-07-24 DIAGNOSIS — M79.672 BILATERAL FOOT PAIN: ICD-10-CM

## 2019-07-24 PROCEDURE — 3044F HG A1C LEVEL LT 7.0%: CPT | Performed by: PODIATRIST

## 2019-07-24 PROCEDURE — 3017F COLORECTAL CA SCREEN DOC REV: CPT | Performed by: PODIATRIST

## 2019-07-24 PROCEDURE — G8428 CUR MEDS NOT DOCUMENT: HCPCS | Performed by: PODIATRIST

## 2019-07-24 PROCEDURE — G8417 CALC BMI ABV UP PARAM F/U: HCPCS | Performed by: PODIATRIST

## 2019-07-24 PROCEDURE — 2022F DILAT RTA XM EVC RTNOPTHY: CPT | Performed by: PODIATRIST

## 2019-07-24 PROCEDURE — 99213 OFFICE O/P EST LOW 20 MIN: CPT | Performed by: PODIATRIST

## 2019-07-24 PROCEDURE — 11721 DEBRIDE NAIL 6 OR MORE: CPT | Performed by: PODIATRIST

## 2019-07-24 PROCEDURE — 1036F TOBACCO NON-USER: CPT | Performed by: PODIATRIST

## 2019-08-12 DIAGNOSIS — G25.81 RESTLESS LEGS SYNDROME: ICD-10-CM

## 2019-08-14 ENCOUNTER — OFFICE VISIT (OUTPATIENT)
Dept: FAMILY MEDICINE CLINIC | Age: 49
End: 2019-08-14
Payer: MEDICARE

## 2019-08-14 VITALS
DIASTOLIC BLOOD PRESSURE: 82 MMHG | HEART RATE: 97 BPM | BODY MASS INDEX: 33.38 KG/M2 | WEIGHT: 260 LBS | SYSTOLIC BLOOD PRESSURE: 128 MMHG | OXYGEN SATURATION: 98 %

## 2019-08-14 DIAGNOSIS — Z79.4 TYPE 2 DIABETES MELLITUS WITH HYPERGLYCEMIA, WITH LONG-TERM CURRENT USE OF INSULIN (HCC): ICD-10-CM

## 2019-08-14 DIAGNOSIS — E11.65 TYPE 2 DIABETES MELLITUS WITH HYPERGLYCEMIA, WITH LONG-TERM CURRENT USE OF INSULIN (HCC): ICD-10-CM

## 2019-08-14 DIAGNOSIS — M77.11 LATERAL EPICONDYLITIS OF RIGHT ELBOW: Primary | ICD-10-CM

## 2019-08-14 LAB — HBA1C MFR BLD: 5.3 %

## 2019-08-14 PROCEDURE — 3017F COLORECTAL CA SCREEN DOC REV: CPT | Performed by: NURSE PRACTITIONER

## 2019-08-14 PROCEDURE — G8427 DOCREV CUR MEDS BY ELIG CLIN: HCPCS | Performed by: NURSE PRACTITIONER

## 2019-08-14 PROCEDURE — 83036 HEMOGLOBIN GLYCOSYLATED A1C: CPT | Performed by: NURSE PRACTITIONER

## 2019-08-14 PROCEDURE — G8417 CALC BMI ABV UP PARAM F/U: HCPCS | Performed by: NURSE PRACTITIONER

## 2019-08-14 PROCEDURE — 3044F HG A1C LEVEL LT 7.0%: CPT | Performed by: NURSE PRACTITIONER

## 2019-08-14 PROCEDURE — 99214 OFFICE O/P EST MOD 30 MIN: CPT | Performed by: NURSE PRACTITIONER

## 2019-08-14 PROCEDURE — 1036F TOBACCO NON-USER: CPT | Performed by: NURSE PRACTITIONER

## 2019-08-14 PROCEDURE — 2022F DILAT RTA XM EVC RTNOPTHY: CPT | Performed by: NURSE PRACTITIONER

## 2019-08-14 RX ORDER — PREDNISONE 10 MG/1
10 TABLET ORAL
Qty: 15 TABLET | Refills: 0 | Status: SHIPPED | OUTPATIENT
Start: 2019-08-14 | End: 2019-08-19

## 2019-08-14 RX ORDER — METHYLPREDNISOLONE SODIUM SUCCINATE 125 MG/2ML
125 INJECTION, POWDER, LYOPHILIZED, FOR SOLUTION INTRAMUSCULAR; INTRAVENOUS ONCE
Status: COMPLETED | OUTPATIENT
Start: 2019-08-14 | End: 2019-08-14

## 2019-08-14 RX ADMIN — METHYLPREDNISOLONE SODIUM SUCCINATE 125 MG: 125 INJECTION, POWDER, LYOPHILIZED, FOR SOLUTION INTRAMUSCULAR; INTRAVENOUS at 16:05

## 2019-08-14 NOTE — PATIENT INSTRUCTIONS
Patient Education   Patient Education        Tennis Elbow: Exercises  Your Care Instructions  Here are some examples of typical rehabilitation exercises for your condition. Start each exercise slowly. Ease off the exercise if you start to have pain. Your doctor or physical therapist will tell you when you can start these exercises and which ones will work best for you. How to do the exercises  Wrist flexor stretch    1. Extend your arm in front of you with your palm up. 2. Bend your wrist, pointing your hand toward the floor. 3. With your other hand, gently bend your wrist farther until you feel a mild to moderate stretch in your forearm. 4. Hold for at least 15 to 30 seconds. Repeat 2 to 4 times. Wrist extensor stretch    1. Repeat steps 1 to 4 of the stretch above but begin with your extended hand palm down. Ball or sock squeeze    1. Hold a tennis ball (or a rolled-up sock) in your hand. 2. Make a fist around the ball (or sock) and squeeze. 3. Hold for about 6 seconds, and then relax for up to 10 seconds. 4. Repeat 8 to 12 times. 5. Switch the ball (or sock) to your other hand and do 8 to 12 times. Wrist deviation    1. Sit so that your arm is supported but your hand hangs off the edge of a flat surface, such as a table. 2. Hold your hand out like you are shaking hands with someone. 3. Move your hand up and down. 4. Repeat this motion 8 to 12 times. 5. Switch arms. 6. Try to do this exercise twice with each hand. Wrist curls    1. Place your forearm on a table with your hand hanging over the edge of the table, palm up. 2. Place a 1- to 2-pound weight in your hand. This may be a dumbbell, a can of food, or a filled water bottle. 3. Slowly raise and lower the weight while keeping your forearm on the table and your palm facing up. 4. Repeat this motion 8 to 12 times. 5. Switch arms, and do steps 1 through 4.  6. Repeat with your hand facing down toward the floor. Switch arms.     Biceps

## 2019-08-14 NOTE — PROGRESS NOTES
hemoglobin (Hb A1C)   Result Value Ref Range    Hemoglobin A1C 5.3 %       Completed Orders/Prescriptions   Orders Placed This Encounter   Medications    insulin glargine (BASAGLAR KWIKPEN) 100 UNIT/ML injection pen     Sig: Inject 26 Units into the skin nightly     Dispense:  60 mL     Refill:  11    methylPREDNISolone sodium (SOLU-MEDROL) injection 125 mg    predniSONE (DELTASONE) 10 MG tablet     Sig: Take 1 tablet by mouth 3 times daily (with meals) for 5 days     Dispense:  15 tablet     Refill:  0       Administrations This Visit     methylPREDNISolone sodium (SOLU-MEDROL) injection 125 mg     Admin Date  08/14/2019  16:05 Action  Given Dose  125 mg Route  Intramuscular Site  Dorsogluteal Right Administered By  BRENNON Aguilar CNP    Ordering Provider:  BRENNON Aguilar CNP    Patient Supplied?:  No    Comments:  Given by: Talib Casillas MA                    AssessmentPlan/Medical Decision Making     1. Lateral epicondylitis of right elbow  - OTC brace  - handout provided with exercises/stretches  - methylPREDNISolone sodium (SOLU-MEDROL) injection 125 mg  - predniSONE (DELTASONE) 10 MG tablet; Take 1 tablet by mouth 3 times daily (with meals) for 5 days  Dispense: 15 tablet; Refill: 0    2. Type 2 diabetes mellitus with hyperglycemia, with long-term current use of insulin ([address]  - reviewed diabetic diet  - explained that he should not decrease insulin until after the steroid burst as this will cause his blood sugars to be elevated. - insulin glargine (BASAGLAR KWIKPEN) 100 UNIT/ML injection pen; Inject 26 Units into the skin nightly  Dispense: 60 mL; Refill: 11      Return in about 6 months (around 2/14/2020) for Routine follow up of chronic conditions. 1.  Angela Jameson received counseling on the following healthy behaviors: nutrition, exercise and medication adherence  2. Patient given educational materials - see patient instructions  3.   Was a self-tracking handout given in paper form or via

## 2019-08-15 ENCOUNTER — OFFICE VISIT (OUTPATIENT)
Dept: PODIATRY | Age: 49
End: 2019-08-15
Payer: MEDICARE

## 2019-08-15 VITALS — WEIGHT: 260 LBS | RESPIRATION RATE: 16 BRPM | HEIGHT: 73 IN | BODY MASS INDEX: 34.46 KG/M2

## 2019-08-15 DIAGNOSIS — E11.51 TYPE II DIABETES MELLITUS WITH PERIPHERAL CIRCULATORY DISORDER (HCC): Primary | ICD-10-CM

## 2019-08-15 DIAGNOSIS — B35.1 DERMATOPHYTOSIS OF NAIL: ICD-10-CM

## 2019-08-15 PROCEDURE — 99213 OFFICE O/P EST LOW 20 MIN: CPT | Performed by: PODIATRIST

## 2019-08-15 PROCEDURE — G8427 DOCREV CUR MEDS BY ELIG CLIN: HCPCS | Performed by: PODIATRIST

## 2019-08-15 PROCEDURE — 1036F TOBACCO NON-USER: CPT | Performed by: PODIATRIST

## 2019-08-15 PROCEDURE — 3044F HG A1C LEVEL LT 7.0%: CPT | Performed by: PODIATRIST

## 2019-08-15 PROCEDURE — G8417 CALC BMI ABV UP PARAM F/U: HCPCS | Performed by: PODIATRIST

## 2019-08-15 PROCEDURE — 3017F COLORECTAL CA SCREEN DOC REV: CPT | Performed by: PODIATRIST

## 2019-08-15 PROCEDURE — 2022F DILAT RTA XM EVC RTNOPTHY: CPT | Performed by: PODIATRIST

## 2019-08-15 NOTE — PROGRESS NOTES
[facility] PHYSICIANS  [facility]  [address] [place] [address]  Dept: [phone]  Dept Fax: [phone]    RETURN PATIENT PROGRESS NOTE  Date of patient's visit: 8/15/2019  Patient's Name:  Maria A Field YOB: 1970            Patient Care Team:  BRENNON Bhakta CNP as PCP - General (Nurse Practitioner)  BRENNON Bhakta CNP as PCP - [facility] Empaneled Provider  Jose Antonio Jessica DPM as Physician (Podiatry)       Maria A Field 47 y.o. male that presents for follow-up of   Chief Complaint   Patient presents with    Diabetes    Nail Problem     nail snagged on shoe, left great toenail     Pt's primary care physician is BRENNON Bhakta CNP last seen 8/14/2019  Symptoms began today  and are unchanged . Patient relates pain is Present. Pain is rated 4 out of 10 and is described as constant, mild. Treatments prior to today's visit include: putting a bandaid on the toe to allow it to stay down. Currently denies F/C/N/V. Allergies   Allergen Reactions    Codeine     Corticosteroids     Ibuprofen        Past Medical History:   Diagnosis Date    Hyperlipidemia     Hypertension     Kidney calculi     Lumbar radiculopathy 6/9/2017    Obstructive sleep apnea syndrome 6/9/2017    Restless legs syndrome 6/9/2017    Severe obesity (BMI 35.0-39. 9) with comorbidity ([address] 7/31/2018       Prior to Admission medications    Medication Sig Start Date End Date Taking? Authorizing Provider   insulin glargine (BASAGLAR KWIKPEN) 100 UNIT/ML injection pen Inject 26 Units into the skin nightly 8/14/19  Yes BRENNON Sanchez CNP   predniSONE (DELTASONE) 10 MG tablet Take 1 tablet by mouth 3 times daily (with meals) for 5 days 8/14/19 8/19/19 Yes BRENNON Gilmore CNP   metroNIDAZOLE (METROCREAM) 0.75 % cream Apply topically 2 times daily to nose and cheeks 6/18/19  Yes Pieter Ignacio MD   amitriptyline (ELAVIL) 25 MG tablet Take 2 tab nightly.  6/5/19  Yes diaphoresis, fatigue, fever and unexpected weight change. Musculoskeletal: Positive for arthralgias, gait problem and joint swelling. Neurological ROS: negative for - behavioral changes, confusion, headaches or seizures. Negative for weakness and numbness. Dermatological ROS: negative for - mole changes, rash  Cardiovascular: Negative for leg swelling. Gastrointestinal: Negative for constipation, diarrhea, nausea and vomiting. Lower Extremity Physical Examination:     Vitals:   Vitals:    08/15/19 1504   Resp: 16     General: AAO x 3 in NAD. Dermatologic Exam:  Skin lesion/ulceration Absent . Skin No rashes or nodules noted. .       Musculoskeletal:     1st MPJ ROM decreased, Bilateral.  Muscle strength 5/5, Bilateral.  Pain present upon palpation of left great toe nail. There is a small hematoma 10% of the nail involvement. There is no loosening of the left great toenail at the base  . Medial longitudinal arch, Bilateral WNL. Ankle ROM WNL,Bilateral.    Dorsally contracted digits absent digits 1-5 Bilateral.     Vascular: DP and PT pulses palpable 2/4, Bilateral.  CFT <3 seconds, Bilateral.  Hair growth present to the level of the digits, Bilateral.  Edema absent, Bilateral.  Varicosities absent, Bilateral. Erythema absent, Bilateral    Neurological: Sensation intact to light touch to level of digits, Bilateral.  Protective sensation intact 10/10 sites via 5.07/10g [place] Monofilament, Bilateral.  negative Tinel's, Bilateral.  negative Valleix sign, Bilateral.      Integument: Warm, dry, supple, Bilateral.  Open lesion absent, Bilateral.  Interdigital maceration absent to web spaces 1-4, Bilateral.  Nails are normal in length, thickness and color 1-5 bilateral.  Fissures absent, Bilateral.       Asessment: Patient is a 47 y.o. male with:    Diagnosis Orders   1. Type II diabetes mellitus with peripheral circulatory disorder (HCC)  [facility] DIABETES FOOT EXAM   2.  Dermatophytosis of nail

## 2019-09-04 ENCOUNTER — TELEPHONE (OUTPATIENT)
Dept: FAMILY MEDICINE CLINIC | Age: 49
End: 2019-09-04

## 2019-09-05 ENCOUNTER — OFFICE VISIT (OUTPATIENT)
Dept: NEUROLOGY | Age: 49
End: 2019-09-05
Payer: MEDICARE

## 2019-09-05 ENCOUNTER — OFFICE VISIT (OUTPATIENT)
Dept: DERMATOLOGY | Age: 49
End: 2019-09-05
Payer: MEDICARE

## 2019-09-05 VITALS
WEIGHT: 270 LBS | HEART RATE: 91 BPM | HEIGHT: 73 IN | SYSTOLIC BLOOD PRESSURE: 139 MMHG | DIASTOLIC BLOOD PRESSURE: 88 MMHG | BODY MASS INDEX: 35.78 KG/M2

## 2019-09-05 VITALS
SYSTOLIC BLOOD PRESSURE: 142 MMHG | OXYGEN SATURATION: 96 % | HEIGHT: 72 IN | BODY MASS INDEX: 36.68 KG/M2 | WEIGHT: 270.8 LBS | DIASTOLIC BLOOD PRESSURE: 92 MMHG | HEART RATE: 98 BPM

## 2019-09-05 DIAGNOSIS — R20.2 NUMBNESS AND TINGLING IN BOTH HANDS: ICD-10-CM

## 2019-09-05 DIAGNOSIS — L73.9 FOLLICULITIS: ICD-10-CM

## 2019-09-05 DIAGNOSIS — L71.9 ROSACEA: Primary | ICD-10-CM

## 2019-09-05 DIAGNOSIS — E11.42 DIABETIC PERIPHERAL NEUROPATHY (HCC): Primary | ICD-10-CM

## 2019-09-05 DIAGNOSIS — R26.9 GAIT DIFFICULTY: ICD-10-CM

## 2019-09-05 DIAGNOSIS — E11.42 DIABETIC POLYNEUROPATHY ASSOCIATED WITH TYPE 2 DIABETES MELLITUS (HCC): ICD-10-CM

## 2019-09-05 DIAGNOSIS — G25.81 RESTLESS LEGS SYNDROME: ICD-10-CM

## 2019-09-05 DIAGNOSIS — R20.0 NUMBNESS AND TINGLING IN BOTH HANDS: ICD-10-CM

## 2019-09-05 PROCEDURE — 3017F COLORECTAL CA SCREEN DOC REV: CPT | Performed by: PSYCHIATRY & NEUROLOGY

## 2019-09-05 PROCEDURE — 99214 OFFICE O/P EST MOD 30 MIN: CPT | Performed by: DERMATOLOGY

## 2019-09-05 PROCEDURE — 1036F TOBACCO NON-USER: CPT | Performed by: PSYCHIATRY & NEUROLOGY

## 2019-09-05 PROCEDURE — 1036F TOBACCO NON-USER: CPT | Performed by: DERMATOLOGY

## 2019-09-05 PROCEDURE — 2022F DILAT RTA XM EVC RTNOPTHY: CPT | Performed by: PSYCHIATRY & NEUROLOGY

## 2019-09-05 PROCEDURE — G8417 CALC BMI ABV UP PARAM F/U: HCPCS | Performed by: PSYCHIATRY & NEUROLOGY

## 2019-09-05 PROCEDURE — G8427 DOCREV CUR MEDS BY ELIG CLIN: HCPCS | Performed by: DERMATOLOGY

## 2019-09-05 PROCEDURE — 3017F COLORECTAL CA SCREEN DOC REV: CPT | Performed by: DERMATOLOGY

## 2019-09-05 PROCEDURE — G8417 CALC BMI ABV UP PARAM F/U: HCPCS | Performed by: DERMATOLOGY

## 2019-09-05 PROCEDURE — G8427 DOCREV CUR MEDS BY ELIG CLIN: HCPCS | Performed by: PSYCHIATRY & NEUROLOGY

## 2019-09-05 PROCEDURE — 3044F HG A1C LEVEL LT 7.0%: CPT | Performed by: PSYCHIATRY & NEUROLOGY

## 2019-09-05 PROCEDURE — 99214 OFFICE O/P EST MOD 30 MIN: CPT | Performed by: PSYCHIATRY & NEUROLOGY

## 2019-09-05 RX ORDER — AMITRIPTYLINE HYDROCHLORIDE 25 MG/1
TABLET, FILM COATED ORAL
Qty: 60 TABLET | Refills: 5 | Status: SHIPPED | OUTPATIENT
Start: 2019-09-05 | End: 2020-05-04 | Stop reason: SDUPTHER

## 2019-09-05 RX ORDER — DOXYCYCLINE HYCLATE 100 MG/1
CAPSULE ORAL
Qty: 60 CAPSULE | Refills: 2 | Status: SHIPPED | OUTPATIENT
Start: 2019-09-05 | End: 2019-12-06 | Stop reason: ALTCHOICE

## 2019-09-05 NOTE — PATIENT INSTRUCTIONS
1. Doxycycline 100 mg twice daily; take with food to avoid stomach upset  2. Follow up in 3 months  3. Continue BPO wash to body daily  4.  Discontinue metrocream

## 2019-09-05 NOTE — PROGRESS NOTES
NEUROLOGY FOLLOW UP VISIT   Patient Name:       Roshan Stone  :        1970  Clinic Visit Date:    2019    Dear Dr. Sudhakar Elizondo, APRN - CNP     I saw Mr. Roshan Stone in follow-up in the office today in continuation of neurologic care. As you know he  is a 47 y.o. right handed  male with diabetic peripheral polyneuropathy superimposed with bilateral carpal tunnel syndrome. Today he comes to clinic after follow-up visit with hand surgeon. Patient stated that he was told to have severe entrapment neuropathy, right ulnar neuropathy superimposed on bilateral CTS and he was told that his neuropathy is a severe enough that they may not be able to help him much. He was advised to continue medications for neuropathic pain relief. Patient also stated that low-dose of Sinemet has been helping significantly for restless legs. Has not had any medication related side effects and he wants to continue the same. Patient stated that he still has abnormal sensations with the numbness and tingling in fingers and pain in right elbow along with stinging sensation and cramps in both calves. He has restless legs with intermittent myoclonic jerks through the night. He also has been having significant gait difficulties with \"occasional right leg giving out\". But these are getting better with Sinemet. He has ongoing dysesthesias in bilateral lower extremities with marginal relief with amitriptyline. Increasing the dose of amitriptyline is making him lethargic. He is taking 50 mg every night. He was borderline diabetic for > 3 years. He is on multiple diabetic medications including insulin, Victoza, etc.  His blood sugars had been under control but his symptoms had been getting worse. He has had NCS/EMG testing of lower extremities and he was told to have neuropathy. He was on gabapentin 300 mg 3 times daily and it has caused increased sleepiness and he stopped taking it.   He also tried Lyrica and language, no hallucination or delusion: Appropriate affect   CRANIAL NERVES: II     -      PERRLA, Fundi reveal intact venous pulsations; Visual fields intact to confrontation  III,IV,VI -  EOMs full, no afferent defect, no                      KRISTINE, no ptosis  V     -     Normal facial sensation  VII    -     Normal facial symmetry  VIII   -     Intact hearing  IX,X -     Symmetrical palate  XI    -     Symmetrical shoulder shrug  XII   -     Midline tongue, no atrophy    MOTOR FUNCTION:  significant for good strength of grade 5/5 in bilateral proximal and distal muscle groups of both upper and lower extremities with normal bulk, normal tone and no involuntary movements, no tremor   SENSORY FUNCTION:  Impaired light touch, pinprick and temperature in distal lower extremities bilaterally. CEREBELLAR FUNCTION:  Intact fine motor control over upper limbs   REFLEX FUNCTION:  Symmetric, 1+ DTRs at both kneed and diminished ankle jerks bilaterally   STATION and GAIT  Normal station, Wide-based gait; could not do tandem gait; +ve romberg sign       Diagnostic data reviewed with the patient:   NCS/EMG of bilateral upper and lower extremities 5/29/18:    Performed by Dr. Samantha Bell:   Abnormal study; electrophysiologic evidence of mild bilateral carpal tunnel syndrome. EMG of Bilateral UE (4/4/19):   Electrodiagnostic Interpretation:   There is electrophysiologic evidence of ulnar neuropathy at right elbow with demyelinating and axonal features. Supporting features include low ulnar snap, prolonged ulnar cmap with slowing of greater than 10-11 m/s across the elbow segment compared with the forearm segment.     This study also demonstrated electrophysiologic evidence of bilateral median neuropathy (bilateral CTS)  of mild-moderate severity.            Impression and Plan: Mr. Kevin Harding is a 47 y.o. male with   Entrapment neuropathy; right ulnar neuropathy superimposed on bilateral CTS: has had consultation with

## 2019-09-06 NOTE — PROGRESS NOTES
TO 4 TIMES DAILY 100 each 1    PHARMACIST CHOICE LANCETS MISC USE TO TEST BLOOD SUGAR 4 TIMES A  each 3    PHARMACIST CHOICE ALCOHOL 70 % PADS USE BEFORE TESTING SUGAR (4 TIMES) PLUS BEFORE USING INSULIN (5 TIMES A DAY) 270 each 11    COMFORT EZ PEN NEEDLES 32G X 4 MM MISC USE 5 TIMES A DAY (CHANGE NEEDLE WITH INSULIN EACH TIME USE OF VICTOZA, BASAGLAR AND WITH HUMALOG) 150 each 11    omeprazole (PRILOSEC) 20 MG delayed release capsule Take 1 capsule by mouth every morning (before breakfast) 30 capsule 5    nystatin (MYCOSTATIN) 722450 UNIT/GM cream Apply topically 2 times daily. 15 g 3    carbidopa-levodopa (SINEMET)  MG per tablet TAKE ONE-HALF TABLET BY MOUTH AT 8 PM AND 11 PM 30 tablet 5    amitriptyline (ELAVIL) 25 MG tablet TAKE 2 TAB NIGHTLY 60 tablet 5     No current facility-administered medications for this visit. ALLERGIES:   Allergies   Allergen Reactions    Codeine     Corticosteroids     Ibuprofen     Metformin And Related Diarrhea       SOCIAL HISTORY:  Social History     Tobacco Use    Smoking status: Former Smoker     Packs/day: 3.00     Years: 20.00     Pack years: 60.00     Types: E-Cigarettes    Smokeless tobacco: Never Used   Substance Use Topics    Alcohol use: No       REVIEW OF SYSTEMS:  Review of Systems   Constitutional: Negative. Skin:Denies any new changing, growing or bleeding lesions or rashes except as described in the HPI     PHYSICAL EXAM:   BP (!) 142/92   Pulse 98   Ht 6' (1.829 m)   Wt 270 lb 12.8 oz (122.8 kg)   SpO2 96%   BMI 36.73 kg/m²     General Exam:  General Appearance: No acute distress, Well nourished     Neuro: Alert and oriented to person, place and time  Psych: Normal affect   Lymph Node: Not performed    Cutaneous Exam: Performed as documented in clinic note below.   Acne exam, which includes the head/face, neck, chest, and back was performed    Pertinent Physical Exam Findings:  Physical Exam   Skin:   Erythema and pustules

## 2019-09-25 ENCOUNTER — OFFICE VISIT (OUTPATIENT)
Dept: PODIATRY | Age: 49
End: 2019-09-25
Payer: MEDICARE

## 2019-09-25 VITALS — BODY MASS INDEX: 33.57 KG/M2 | WEIGHT: 270 LBS | HEIGHT: 75 IN | RESPIRATION RATE: 16 BRPM

## 2019-09-25 DIAGNOSIS — I73.9 PERIPHERAL VASCULAR DISORDER (HCC): ICD-10-CM

## 2019-09-25 DIAGNOSIS — M21.372 FOOT DROP, LEFT: ICD-10-CM

## 2019-09-25 DIAGNOSIS — E11.51 TYPE II DIABETES MELLITUS WITH PERIPHERAL CIRCULATORY DISORDER (HCC): Primary | ICD-10-CM

## 2019-09-25 DIAGNOSIS — B35.1 DERMATOPHYTOSIS OF NAIL: ICD-10-CM

## 2019-09-25 DIAGNOSIS — M79.672 BILATERAL FOOT PAIN: ICD-10-CM

## 2019-09-25 DIAGNOSIS — M79.671 BILATERAL FOOT PAIN: ICD-10-CM

## 2019-09-25 PROCEDURE — 3044F HG A1C LEVEL LT 7.0%: CPT | Performed by: PODIATRIST

## 2019-09-25 PROCEDURE — 99213 OFFICE O/P EST LOW 20 MIN: CPT | Performed by: PODIATRIST

## 2019-09-25 PROCEDURE — 2022F DILAT RTA XM EVC RTNOPTHY: CPT | Performed by: PODIATRIST

## 2019-09-25 PROCEDURE — G8417 CALC BMI ABV UP PARAM F/U: HCPCS | Performed by: PODIATRIST

## 2019-09-25 PROCEDURE — 11721 DEBRIDE NAIL 6 OR MORE: CPT | Performed by: PODIATRIST

## 2019-09-25 PROCEDURE — 1036F TOBACCO NON-USER: CPT | Performed by: PODIATRIST

## 2019-09-25 PROCEDURE — 3017F COLORECTAL CA SCREEN DOC REV: CPT | Performed by: PODIATRIST

## 2019-09-25 PROCEDURE — G8428 CUR MEDS NOT DOCUMENT: HCPCS | Performed by: PODIATRIST

## 2019-09-25 NOTE — PROGRESS NOTES
amitriptyline (ELAVIL) 25 MG tablet TAKE 2 TAB NIGHTLY 60 tablet 5    insulin glargine (BASAGLAR KWIKPEN) 100 UNIT/ML injection pen Inject 26 Units into the skin nightly 60 mL 11    metroNIDAZOLE (METROCREAM) 0.75 % cream Apply topically 2 times daily to nose and cheeks 45 g 2    Semaglutide 1 MG/DOSE SOPN Inject 1 mg into the skin once a week 3 pen 6    ammonium lactate (LAC-HYDRIN) 12 % cream Apply topically as needed. 1 Bottle 4    insulin lispro (HUMALOG KWIKPEN) 100 UNIT/ML pen INJECT 1 TO 14 UNITS UNDER THE SKIN THREE TIMES DAILY BEFORE MEALS PER SLIDING SCALE 90 mL 11    Lancet Devices (SIMPLE DIAGNOSTICS LANCING DEV) MISC USE TO TEST BLOOD SUGAR 4 TIMES A DAY 1 each 3    TRUE METRIX BLOOD GLUCOSE TEST strip TEST BLOOD SUGAR 3 TO 4 TIMES DAILY 100 each 1    PHARMACIST CHOICE LANCETS MISC USE TO TEST BLOOD SUGAR 4 TIMES A  each 3    PHARMACIST CHOICE ALCOHOL 70 % PADS USE BEFORE TESTING SUGAR (4 TIMES) PLUS BEFORE USING INSULIN (5 TIMES A DAY) 270 each 11    COMFORT EZ PEN NEEDLES 32G X 4 MM MISC USE 5 TIMES A DAY (CHANGE NEEDLE WITH INSULIN EACH TIME USE OF VICTOZA, BASAGLAR AND WITH HUMALOG) 150 each 11    omeprazole (PRILOSEC) 20 MG delayed release capsule Take 1 capsule by mouth every morning (before breakfast) 30 capsule 5    nystatin (MYCOSTATIN) 174378 UNIT/GM cream Apply topically 2 times daily. 15 g 3     No current facility-administered medications on file prior to visit. Review of Systems    Review of Systems:   History obtained from chart review and the patient  General ROS: negative for - chills, fatigue, fever, night sweats or weight gain  Constitutional: Negative for chills, diaphoresis, fatigue, fever and unexpected weight change. Musculoskeletal: Positive for arthralgias, gait problem and joint swelling. Neurological ROS: negative for - behavioral changes, confusion, headaches or seizures. Negative for weakness and numbness.    Dermatological ROS: negative for - mole their questions were answered in detail. Proper foot hygiene and care was discussed with the pt. Informed patient on proper diabetic foot care and importance of tight glycemic control. Patient to check feet daily and contact the office with any questions/problems/concerns.    Other comorbidity noted and will be managed by PCP.  9/25/2019    Electronically signed by Dragan Marshall DPM on 9/25/2019 at 1:36 PM  9/25/2019

## 2019-10-04 RX ORDER — AMMONIUM LACTATE 12 G/100G
CREAM TOPICAL
Qty: 280 G | Refills: 2 | Status: SHIPPED | OUTPATIENT
Start: 2019-10-04

## 2019-10-07 ENCOUNTER — PROCEDURE VISIT (OUTPATIENT)
Dept: PODIATRY | Age: 49
End: 2019-10-07
Payer: MEDICARE

## 2019-10-07 VITALS — WEIGHT: 270 LBS | BODY MASS INDEX: 33.57 KG/M2 | HEIGHT: 75 IN

## 2019-10-07 DIAGNOSIS — L60.0 OC (ONYCHOCRYPTOSIS): Primary | ICD-10-CM

## 2019-10-07 DIAGNOSIS — E11.42 DIABETIC POLYNEUROPATHY ASSOCIATED WITH TYPE 2 DIABETES MELLITUS (HCC): ICD-10-CM

## 2019-10-07 PROCEDURE — 3017F COLORECTAL CA SCREEN DOC REV: CPT | Performed by: PODIATRIST

## 2019-10-07 PROCEDURE — 99213 OFFICE O/P EST LOW 20 MIN: CPT | Performed by: PODIATRIST

## 2019-10-07 PROCEDURE — 2022F DILAT RTA XM EVC RTNOPTHY: CPT | Performed by: PODIATRIST

## 2019-10-07 PROCEDURE — G8427 DOCREV CUR MEDS BY ELIG CLIN: HCPCS | Performed by: PODIATRIST

## 2019-10-07 PROCEDURE — G8484 FLU IMMUNIZE NO ADMIN: HCPCS | Performed by: PODIATRIST

## 2019-10-07 PROCEDURE — G8417 CALC BMI ABV UP PARAM F/U: HCPCS | Performed by: PODIATRIST

## 2019-10-07 PROCEDURE — 1036F TOBACCO NON-USER: CPT | Performed by: PODIATRIST

## 2019-10-07 PROCEDURE — 3044F HG A1C LEVEL LT 7.0%: CPT | Performed by: PODIATRIST

## 2019-10-11 ENCOUNTER — TELEPHONE (OUTPATIENT)
Dept: FAMILY MEDICINE CLINIC | Age: 49
End: 2019-10-11

## 2019-10-15 DIAGNOSIS — E11.65 TYPE 2 DIABETES MELLITUS WITH HYPERGLYCEMIA, WITH LONG-TERM CURRENT USE OF INSULIN (HCC): ICD-10-CM

## 2019-10-15 DIAGNOSIS — Z79.4 TYPE 2 DIABETES MELLITUS WITH HYPERGLYCEMIA, WITH LONG-TERM CURRENT USE OF INSULIN (HCC): ICD-10-CM

## 2019-10-17 ENCOUNTER — OFFICE VISIT (OUTPATIENT)
Dept: FAMILY MEDICINE CLINIC | Age: 49
End: 2019-10-17
Payer: MEDICARE

## 2019-10-17 VITALS
DIASTOLIC BLOOD PRESSURE: 92 MMHG | WEIGHT: 282 LBS | OXYGEN SATURATION: 96 % | HEART RATE: 94 BPM | SYSTOLIC BLOOD PRESSURE: 148 MMHG | BODY MASS INDEX: 35.25 KG/M2

## 2019-10-17 DIAGNOSIS — M21.372 LEFT FOOT DROP: ICD-10-CM

## 2019-10-17 DIAGNOSIS — E11.65 TYPE 2 DIABETES MELLITUS WITH HYPERGLYCEMIA, WITH LONG-TERM CURRENT USE OF INSULIN (HCC): ICD-10-CM

## 2019-10-17 DIAGNOSIS — Z79.4 TYPE 2 DIABETES MELLITUS WITH HYPERGLYCEMIA, WITH LONG-TERM CURRENT USE OF INSULIN (HCC): ICD-10-CM

## 2019-10-17 DIAGNOSIS — M79.605 BILATERAL LEG PAIN: ICD-10-CM

## 2019-10-17 DIAGNOSIS — M79.604 BILATERAL LEG PAIN: ICD-10-CM

## 2019-10-17 DIAGNOSIS — E11.42 DIABETIC POLYNEUROPATHY ASSOCIATED WITH TYPE 2 DIABETES MELLITUS (HCC): Primary | ICD-10-CM

## 2019-10-17 DIAGNOSIS — M54.9 UPPER BACK PAIN ON LEFT SIDE: ICD-10-CM

## 2019-10-17 PROCEDURE — 2022F DILAT RTA XM EVC RTNOPTHY: CPT | Performed by: NURSE PRACTITIONER

## 2019-10-17 PROCEDURE — 1036F TOBACCO NON-USER: CPT | Performed by: NURSE PRACTITIONER

## 2019-10-17 PROCEDURE — 3017F COLORECTAL CA SCREEN DOC REV: CPT | Performed by: NURSE PRACTITIONER

## 2019-10-17 PROCEDURE — 99214 OFFICE O/P EST MOD 30 MIN: CPT | Performed by: NURSE PRACTITIONER

## 2019-10-17 PROCEDURE — G8484 FLU IMMUNIZE NO ADMIN: HCPCS | Performed by: NURSE PRACTITIONER

## 2019-10-17 PROCEDURE — G8427 DOCREV CUR MEDS BY ELIG CLIN: HCPCS | Performed by: NURSE PRACTITIONER

## 2019-10-17 PROCEDURE — G8417 CALC BMI ABV UP PARAM F/U: HCPCS | Performed by: NURSE PRACTITIONER

## 2019-10-17 PROCEDURE — 3044F HG A1C LEVEL LT 7.0%: CPT | Performed by: NURSE PRACTITIONER

## 2019-10-21 ENCOUNTER — TELEPHONE (OUTPATIENT)
Dept: FAMILY MEDICINE CLINIC | Age: 49
End: 2019-10-21

## 2019-10-22 DIAGNOSIS — Z79.4 TYPE 2 DIABETES MELLITUS WITH HYPERGLYCEMIA, WITH LONG-TERM CURRENT USE OF INSULIN (HCC): Primary | ICD-10-CM

## 2019-10-22 DIAGNOSIS — E11.65 TYPE 2 DIABETES MELLITUS WITH HYPERGLYCEMIA, WITH LONG-TERM CURRENT USE OF INSULIN (HCC): Primary | ICD-10-CM

## 2019-10-22 RX ORDER — GLUCOSAMINE HCL/CHONDROITIN SU 500-400 MG
CAPSULE ORAL
Qty: 120 STRIP | Refills: 3 | Status: SHIPPED | OUTPATIENT
Start: 2019-10-22 | End: 2021-06-09

## 2019-10-23 ENCOUNTER — TELEPHONE (OUTPATIENT)
Dept: FAMILY MEDICINE CLINIC | Age: 49
End: 2019-10-23

## 2019-10-29 ENCOUNTER — HOSPITAL ENCOUNTER (OUTPATIENT)
Dept: GENERAL RADIOLOGY | Facility: CLINIC | Age: 49
Discharge: HOME | End: 2019-10-31
Payer: MEDICARE

## 2019-10-29 ENCOUNTER — HOSPITAL ENCOUNTER (OUTPATIENT)
Dept: MRI IMAGING | Facility: CLINIC | Age: 49
Discharge: HOME | End: 2019-10-31
Payer: MEDICARE

## 2019-10-29 DIAGNOSIS — M54.9 UPPER BACK PAIN ON LEFT SIDE: ICD-10-CM

## 2019-10-29 DIAGNOSIS — M21.372 LEFT FOOT DROP: ICD-10-CM

## 2019-10-29 DIAGNOSIS — M79.605 BILATERAL LEG PAIN: ICD-10-CM

## 2019-10-29 DIAGNOSIS — M79.604 BILATERAL LEG PAIN: ICD-10-CM

## 2019-10-29 DIAGNOSIS — E11.42 DIABETIC POLYNEUROPATHY ASSOCIATED WITH TYPE 2 DIABETES MELLITUS (HCC): ICD-10-CM

## 2019-10-29 PROCEDURE — 71046 X-RAY EXAM CHEST 2 VIEWS: CPT

## 2019-10-29 PROCEDURE — 72148 MRI LUMBAR SPINE W/O DYE: CPT

## 2019-11-15 ENCOUNTER — OFFICE VISIT (OUTPATIENT)
Dept: FAMILY MEDICINE CLINIC | Age: 49
End: 2019-11-15
Payer: MEDICARE

## 2019-11-15 VITALS
WEIGHT: 283.2 LBS | DIASTOLIC BLOOD PRESSURE: 100 MMHG | BODY MASS INDEX: 35.4 KG/M2 | SYSTOLIC BLOOD PRESSURE: 142 MMHG | OXYGEN SATURATION: 98 % | HEART RATE: 122 BPM

## 2019-11-15 DIAGNOSIS — M21.372 LEFT FOOT DROP: ICD-10-CM

## 2019-11-15 DIAGNOSIS — E11.42 DIABETIC POLYNEUROPATHY ASSOCIATED WITH TYPE 2 DIABETES MELLITUS (HCC): ICD-10-CM

## 2019-11-15 DIAGNOSIS — Z13.220 SCREENING CHOLESTEROL LEVEL: ICD-10-CM

## 2019-11-15 DIAGNOSIS — R93.7 ABNORMAL MRI, LUMBAR SPINE: ICD-10-CM

## 2019-11-15 DIAGNOSIS — E11.65 TYPE 2 DIABETES MELLITUS WITH HYPERGLYCEMIA, WITH LONG-TERM CURRENT USE OF INSULIN (HCC): Primary | ICD-10-CM

## 2019-11-15 DIAGNOSIS — Z13.29 THYROID DISORDER SCREEN: ICD-10-CM

## 2019-11-15 DIAGNOSIS — Z79.4 TYPE 2 DIABETES MELLITUS WITH HYPERGLYCEMIA, WITH LONG-TERM CURRENT USE OF INSULIN (HCC): Primary | ICD-10-CM

## 2019-11-15 DIAGNOSIS — K21.9 GASTROESOPHAGEAL REFLUX DISEASE WITHOUT ESOPHAGITIS: ICD-10-CM

## 2019-11-15 LAB — HBA1C MFR BLD: 5.5 %

## 2019-11-15 PROCEDURE — G8484 FLU IMMUNIZE NO ADMIN: HCPCS | Performed by: NURSE PRACTITIONER

## 2019-11-15 PROCEDURE — G8427 DOCREV CUR MEDS BY ELIG CLIN: HCPCS | Performed by: NURSE PRACTITIONER

## 2019-11-15 PROCEDURE — 99213 OFFICE O/P EST LOW 20 MIN: CPT | Performed by: NURSE PRACTITIONER

## 2019-11-15 PROCEDURE — 1036F TOBACCO NON-USER: CPT | Performed by: NURSE PRACTITIONER

## 2019-11-15 PROCEDURE — 3044F HG A1C LEVEL LT 7.0%: CPT | Performed by: NURSE PRACTITIONER

## 2019-11-15 PROCEDURE — 3017F COLORECTAL CA SCREEN DOC REV: CPT | Performed by: NURSE PRACTITIONER

## 2019-11-15 PROCEDURE — 83036 HEMOGLOBIN GLYCOSYLATED A1C: CPT | Performed by: NURSE PRACTITIONER

## 2019-11-15 PROCEDURE — G8417 CALC BMI ABV UP PARAM F/U: HCPCS | Performed by: NURSE PRACTITIONER

## 2019-11-15 PROCEDURE — 2022F DILAT RTA XM EVC RTNOPTHY: CPT | Performed by: NURSE PRACTITIONER

## 2019-11-15 RX ORDER — OMEPRAZOLE 20 MG/1
20 CAPSULE, DELAYED RELEASE ORAL
Qty: 90 CAPSULE | Refills: 3 | Status: SHIPPED | OUTPATIENT
Start: 2019-11-15 | End: 2020-11-09

## 2019-11-19 LAB
ALBUMIN SERPL-MCNC: 4.3 G/DL
ALP BLD-CCNC: 113 U/L
ALT SERPL-CCNC: 25 U/L
ANION GAP SERPL CALCULATED.3IONS-SCNC: NORMAL MMOL/L
AST SERPL-CCNC: 26 U/L
BASOPHILS ABSOLUTE: NORMAL
BASOPHILS RELATIVE PERCENT: NORMAL
BILIRUB SERPL-MCNC: 0.5 MG/DL (ref 0.1–1.4)
BUN BLDV-MCNC: 11 MG/DL
CALCIUM SERPL-MCNC: 9.7 MG/DL
CHLORIDE BLD-SCNC: 102 MMOL/L
CHOLESTEROL, FASTING: 231
CO2: 26 MMOL/L
CREAT SERPL-MCNC: 0.9 MG/DL
EOSINOPHILS ABSOLUTE: NORMAL
EOSINOPHILS RELATIVE PERCENT: NORMAL
GFR CALCULATED: 89.3
GLUCOSE BLD-MCNC: 103 MG/DL
HCT VFR BLD CALC: 50.3 % (ref 41–53)
HDLC SERPL-MCNC: 34 MG/DL (ref 35–70)
HEMOGLOBIN: 16.7 G/DL (ref 13.5–17.5)
LDL CHOLESTEROL CALCULATED: 164 MG/DL (ref 0–160)
LYMPHOCYTES ABSOLUTE: NORMAL
LYMPHOCYTES RELATIVE PERCENT: NORMAL
MCH RBC QN AUTO: 28.3 PG
MCHC RBC AUTO-ENTMCNC: 33.2 G/DL
MCV RBC AUTO: 85.1 FL
MONOCYTES ABSOLUTE: NORMAL
MONOCYTES RELATIVE PERCENT: NORMAL
NEUTROPHILS ABSOLUTE: NORMAL
NEUTROPHILS RELATIVE PERCENT: NORMAL
PLATELET # BLD: 236 K/ΜL
PMV BLD AUTO: NORMAL FL
POTASSIUM SERPL-SCNC: 4.7 MMOL/L
RBC # BLD: 5.91 10^6/ΜL
SODIUM BLD-SCNC: 139 MMOL/L
T4 FREE: 0.9
TOTAL PROTEIN: 7.5
TRIGLYCERIDE, FASTING: 166
TSH SERPL DL<=0.05 MIU/L-ACNC: 1.17 UIU/ML
WBC # BLD: 7.34 10^3/ML

## 2019-11-25 ENCOUNTER — OFFICE VISIT (OUTPATIENT)
Dept: NEUROSURGERY | Age: 49
End: 2019-11-25
Payer: MEDICARE

## 2019-11-25 VITALS
SYSTOLIC BLOOD PRESSURE: 144 MMHG | DIASTOLIC BLOOD PRESSURE: 98 MMHG | BODY MASS INDEX: 34.75 KG/M2 | HEART RATE: 97 BPM | WEIGHT: 278 LBS

## 2019-11-25 DIAGNOSIS — R20.0 BILATERAL HAND NUMBNESS: ICD-10-CM

## 2019-11-25 DIAGNOSIS — M25.552 BILATERAL HIP PAIN: ICD-10-CM

## 2019-11-25 DIAGNOSIS — R20.0 BILATERAL LEG NUMBNESS: ICD-10-CM

## 2019-11-25 DIAGNOSIS — M25.551 BILATERAL HIP PAIN: ICD-10-CM

## 2019-11-25 DIAGNOSIS — G95.9 MYELOPATHY (HCC): Primary | ICD-10-CM

## 2019-11-25 PROCEDURE — G8417 CALC BMI ABV UP PARAM F/U: HCPCS | Performed by: NURSE PRACTITIONER

## 2019-11-25 PROCEDURE — 99203 OFFICE O/P NEW LOW 30 MIN: CPT | Performed by: NURSE PRACTITIONER

## 2019-11-25 PROCEDURE — 1036F TOBACCO NON-USER: CPT | Performed by: NURSE PRACTITIONER

## 2019-11-25 PROCEDURE — G8484 FLU IMMUNIZE NO ADMIN: HCPCS | Performed by: NURSE PRACTITIONER

## 2019-11-25 PROCEDURE — G8427 DOCREV CUR MEDS BY ELIG CLIN: HCPCS | Performed by: NURSE PRACTITIONER

## 2019-11-25 PROCEDURE — 3017F COLORECTAL CA SCREEN DOC REV: CPT | Performed by: NURSE PRACTITIONER

## 2019-11-26 DIAGNOSIS — Z13.220 SCREENING CHOLESTEROL LEVEL: ICD-10-CM

## 2019-11-26 DIAGNOSIS — Z79.4 TYPE 2 DIABETES MELLITUS WITH HYPERGLYCEMIA, WITH LONG-TERM CURRENT USE OF INSULIN (HCC): ICD-10-CM

## 2019-11-26 DIAGNOSIS — E78.00 HYPERCHOLESTEROLEMIA: Primary | ICD-10-CM

## 2019-11-26 DIAGNOSIS — Z13.29 THYROID DISORDER SCREEN: ICD-10-CM

## 2019-11-26 DIAGNOSIS — E11.65 TYPE 2 DIABETES MELLITUS WITH HYPERGLYCEMIA, WITH LONG-TERM CURRENT USE OF INSULIN (HCC): ICD-10-CM

## 2019-11-26 RX ORDER — ATORVASTATIN CALCIUM 20 MG/1
20 TABLET, FILM COATED ORAL DAILY
Qty: 30 TABLET | Refills: 5 | Status: SHIPPED | OUTPATIENT
Start: 2019-11-26 | End: 2020-05-15

## 2019-12-03 ENCOUNTER — OFFICE VISIT (OUTPATIENT)
Dept: PODIATRY | Age: 49
End: 2019-12-03
Payer: MEDICARE

## 2019-12-03 VITALS — RESPIRATION RATE: 18 BRPM | HEIGHT: 73 IN | WEIGHT: 282 LBS | BODY MASS INDEX: 37.37 KG/M2

## 2019-12-03 DIAGNOSIS — B35.1 DERMATOPHYTOSIS OF NAIL: ICD-10-CM

## 2019-12-03 DIAGNOSIS — L85.3 XEROSIS OF SKIN: ICD-10-CM

## 2019-12-03 DIAGNOSIS — I73.9 PVD (PERIPHERAL VASCULAR DISEASE) (HCC): ICD-10-CM

## 2019-12-03 DIAGNOSIS — M79.671 BILATERAL FOOT PAIN: ICD-10-CM

## 2019-12-03 DIAGNOSIS — M79.672 BILATERAL FOOT PAIN: ICD-10-CM

## 2019-12-03 DIAGNOSIS — I73.9 PERIPHERAL VASCULAR DISORDER (HCC): ICD-10-CM

## 2019-12-03 DIAGNOSIS — E11.51 TYPE II DIABETES MELLITUS WITH PERIPHERAL CIRCULATORY DISORDER (HCC): Primary | ICD-10-CM

## 2019-12-03 PROCEDURE — 3017F COLORECTAL CA SCREEN DOC REV: CPT | Performed by: PODIATRIST

## 2019-12-03 PROCEDURE — G8484 FLU IMMUNIZE NO ADMIN: HCPCS | Performed by: PODIATRIST

## 2019-12-03 PROCEDURE — G8427 DOCREV CUR MEDS BY ELIG CLIN: HCPCS | Performed by: PODIATRIST

## 2019-12-03 PROCEDURE — 99213 OFFICE O/P EST LOW 20 MIN: CPT | Performed by: PODIATRIST

## 2019-12-03 PROCEDURE — 1036F TOBACCO NON-USER: CPT | Performed by: PODIATRIST

## 2019-12-03 PROCEDURE — G8417 CALC BMI ABV UP PARAM F/U: HCPCS | Performed by: PODIATRIST

## 2019-12-03 PROCEDURE — 2022F DILAT RTA XM EVC RTNOPTHY: CPT | Performed by: PODIATRIST

## 2019-12-03 PROCEDURE — 11721 DEBRIDE NAIL 6 OR MORE: CPT | Performed by: PODIATRIST

## 2019-12-03 PROCEDURE — 3044F HG A1C LEVEL LT 7.0%: CPT | Performed by: PODIATRIST

## 2019-12-06 ENCOUNTER — OFFICE VISIT (OUTPATIENT)
Dept: DERMATOLOGY | Age: 49
End: 2019-12-06
Payer: MEDICARE

## 2019-12-06 VITALS
HEIGHT: 73 IN | DIASTOLIC BLOOD PRESSURE: 91 MMHG | SYSTOLIC BLOOD PRESSURE: 143 MMHG | OXYGEN SATURATION: 95 % | WEIGHT: 280.4 LBS | HEART RATE: 102 BPM | BODY MASS INDEX: 37.16 KG/M2

## 2019-12-06 DIAGNOSIS — L73.9 FOLLICULITIS: ICD-10-CM

## 2019-12-06 DIAGNOSIS — L71.9 ROSACEA: Primary | ICD-10-CM

## 2019-12-06 PROCEDURE — 1036F TOBACCO NON-USER: CPT | Performed by: DERMATOLOGY

## 2019-12-06 PROCEDURE — G8484 FLU IMMUNIZE NO ADMIN: HCPCS | Performed by: DERMATOLOGY

## 2019-12-06 PROCEDURE — G8417 CALC BMI ABV UP PARAM F/U: HCPCS | Performed by: DERMATOLOGY

## 2019-12-06 PROCEDURE — 99213 OFFICE O/P EST LOW 20 MIN: CPT | Performed by: DERMATOLOGY

## 2019-12-06 PROCEDURE — G8427 DOCREV CUR MEDS BY ELIG CLIN: HCPCS | Performed by: DERMATOLOGY

## 2019-12-06 PROCEDURE — 3017F COLORECTAL CA SCREEN DOC REV: CPT | Performed by: DERMATOLOGY

## 2019-12-06 RX ORDER — CHLORHEXIDINE GLUCONATE 4 G/100ML
SOLUTION TOPICAL
Qty: 473 ML | Refills: 3 | Status: SHIPPED | OUTPATIENT
Start: 2019-12-06 | End: 2019-12-20

## 2019-12-11 ENCOUNTER — HOSPITAL ENCOUNTER (OUTPATIENT)
Dept: MRI IMAGING | Facility: CLINIC | Age: 49
Discharge: HOME | End: 2019-12-13
Payer: MEDICARE

## 2019-12-11 ENCOUNTER — HOSPITAL ENCOUNTER (OUTPATIENT)
Dept: GENERAL RADIOLOGY | Facility: CLINIC | Age: 49
Discharge: HOME | End: 2019-12-13
Payer: MEDICARE

## 2019-12-11 DIAGNOSIS — M25.552 BILATERAL HIP PAIN: ICD-10-CM

## 2019-12-11 DIAGNOSIS — R20.0 BILATERAL LEG NUMBNESS: ICD-10-CM

## 2019-12-11 DIAGNOSIS — R20.0 BILATERAL HAND NUMBNESS: ICD-10-CM

## 2019-12-11 DIAGNOSIS — G95.9 MYELOPATHY (HCC): ICD-10-CM

## 2019-12-11 DIAGNOSIS — M25.551 BILATERAL HIP PAIN: ICD-10-CM

## 2019-12-11 PROCEDURE — 72141 MRI NECK SPINE W/O DYE: CPT

## 2019-12-11 PROCEDURE — 73521 X-RAY EXAM HIPS BI 2 VIEWS: CPT

## 2019-12-16 ENCOUNTER — OFFICE VISIT (OUTPATIENT)
Dept: NEUROSURGERY | Age: 49
End: 2019-12-16
Payer: MEDICARE

## 2019-12-16 VITALS
HEART RATE: 102 BPM | BODY MASS INDEX: 36.94 KG/M2 | DIASTOLIC BLOOD PRESSURE: 94 MMHG | WEIGHT: 280 LBS | SYSTOLIC BLOOD PRESSURE: 143 MMHG

## 2019-12-16 DIAGNOSIS — E04.1 THYROID NODULE: ICD-10-CM

## 2019-12-16 DIAGNOSIS — Q76.49 SPINAL DEFORMITY: ICD-10-CM

## 2019-12-16 DIAGNOSIS — G56.22 ULNAR NEUROPATHY OF LEFT UPPER EXTREMITY: Primary | ICD-10-CM

## 2019-12-16 DIAGNOSIS — R20.0 BILATERAL LEG NUMBNESS: ICD-10-CM

## 2019-12-16 DIAGNOSIS — M51.26 LUMBAR DISC HERNIATION: ICD-10-CM

## 2019-12-16 DIAGNOSIS — M47.812 CERVICAL SPONDYLOSIS: ICD-10-CM

## 2019-12-16 PROCEDURE — 99214 OFFICE O/P EST MOD 30 MIN: CPT | Performed by: NURSE PRACTITIONER

## 2019-12-16 PROCEDURE — G8427 DOCREV CUR MEDS BY ELIG CLIN: HCPCS | Performed by: NURSE PRACTITIONER

## 2019-12-16 PROCEDURE — G8417 CALC BMI ABV UP PARAM F/U: HCPCS | Performed by: NURSE PRACTITIONER

## 2019-12-16 PROCEDURE — G8484 FLU IMMUNIZE NO ADMIN: HCPCS | Performed by: NURSE PRACTITIONER

## 2019-12-16 PROCEDURE — 3017F COLORECTAL CA SCREEN DOC REV: CPT | Performed by: NURSE PRACTITIONER

## 2019-12-16 PROCEDURE — 1036F TOBACCO NON-USER: CPT | Performed by: NURSE PRACTITIONER

## 2019-12-30 ENCOUNTER — HOSPITAL ENCOUNTER (OUTPATIENT)
Dept: ULTRASOUND IMAGING | Facility: CLINIC | Age: 49
Discharge: HOME | End: 2020-01-01
Payer: MEDICARE

## 2019-12-30 ENCOUNTER — HOSPITAL ENCOUNTER (OUTPATIENT)
Dept: GENERAL RADIOLOGY | Facility: CLINIC | Age: 49
Discharge: HOME | End: 2020-01-01
Payer: MEDICARE

## 2019-12-30 PROCEDURE — 72020 X-RAY EXAM OF SPINE 1 VIEW: CPT

## 2019-12-30 PROCEDURE — 76536 US EXAM OF HEAD AND NECK: CPT

## 2019-12-30 PROCEDURE — 72081 X-RAY EXAM ENTIRE SPI 1 VW: CPT

## 2020-01-10 ENCOUNTER — OFFICE VISIT (OUTPATIENT)
Dept: FAMILY MEDICINE CLINIC | Age: 50
End: 2020-01-10
Payer: MEDICARE

## 2020-01-10 VITALS
TEMPERATURE: 97 F | WEIGHT: 282 LBS | DIASTOLIC BLOOD PRESSURE: 82 MMHG | BODY MASS INDEX: 37.21 KG/M2 | HEART RATE: 97 BPM | SYSTOLIC BLOOD PRESSURE: 138 MMHG | OXYGEN SATURATION: 95 %

## 2020-01-10 PROCEDURE — 99214 OFFICE O/P EST MOD 30 MIN: CPT | Performed by: NURSE PRACTITIONER

## 2020-01-10 PROCEDURE — G8417 CALC BMI ABV UP PARAM F/U: HCPCS | Performed by: NURSE PRACTITIONER

## 2020-01-10 PROCEDURE — G8484 FLU IMMUNIZE NO ADMIN: HCPCS | Performed by: NURSE PRACTITIONER

## 2020-01-10 PROCEDURE — 3017F COLORECTAL CA SCREEN DOC REV: CPT | Performed by: NURSE PRACTITIONER

## 2020-01-10 PROCEDURE — 1036F TOBACCO NON-USER: CPT | Performed by: NURSE PRACTITIONER

## 2020-01-10 PROCEDURE — G8427 DOCREV CUR MEDS BY ELIG CLIN: HCPCS | Performed by: NURSE PRACTITIONER

## 2020-01-10 ASSESSMENT — PATIENT HEALTH QUESTIONNAIRE - PHQ9
1. LITTLE INTEREST OR PLEASURE IN DOING THINGS: 0
SUM OF ALL RESPONSES TO PHQ QUESTIONS 1-9: 0
SUM OF ALL RESPONSES TO PHQ QUESTIONS 1-9: 0
SUM OF ALL RESPONSES TO PHQ9 QUESTIONS 1 & 2: 0
2. FEELING DOWN, DEPRESSED OR HOPELESS: 0

## 2020-01-10 NOTE — PROGRESS NOTES
Ignacio Cruz, ALEXEI-C  [address]  [id number] [facility] [place]. [facility] [address]  [phone]  [phone]    Darron Tanner is a 47 y.o. male who is here with c/o of:    Chief Complaint: Results (nodule on thyroid )      Patient Accompanied by: n/a    HPI - Darron Tanner is here today for f/u    Patient is requesting disability and has brought in papers from his  for functional capacity assessment    He is also here for f/u of thyroid nodule that was an incidental finding on imaging   Need for order for biopsy    Patient states several times that he is unhappy with his care in regards to his pain and that he is having to see a neurosurgeon as he has been misdiagnosed  MRI results reviewed      Patient Active Problem List:     Diabetes mellitus ([facility] [address]     Hyperlipidemia     Hypertension     Lumbar radiculopathy     Obstructive sleep apnea syndrome     Restless legs syndrome     Severe obesity (BMI 35.0-39. 9) with comorbidity (HCC)     Numbness and tingling in both hands     Past Medical History:   Diagnosis Date    Hyperlipidemia     Hypertension     Kidney calculi     Lumbar radiculopathy 6/9/2017    Obstructive sleep apnea syndrome 6/9/2017    Restless legs syndrome 6/9/2017    Severe obesity (BMI 35.0-39. 9) with comorbidity ([address] 7/31/2018      Past Surgical History:   Procedure Laterality Date    ANKLE SURGERY Right     GLAUCOMA SURGERY Bilateral 12/2018    SHOULDER SURGERY Right      Family History   Problem Relation Age of Onset    High Cholesterol Mother     Other Mother     Depression Mother     Heart Disease Father     High Cholesterol Father     Alcohol Abuse Father      Social History     Tobacco Use    Smoking status: Former Smoker     Packs/day: 3.00     Years: 20.00     Pack years: 60.00     Types: E-Cigarettes    Smokeless tobacco: Never Used   Substance Use Topics    Alcohol use: No     ALLERGIES:    Allergies   Allergen Reactions    Codeine     S1, S2, no murmur, no gallop, no friction rub, intact distal pulses. · Pulmonary/Chest: Breath sounds are clear throughout, No respiratory distress, No wheezing, No chest tenderness. Effort normal  · Lymphadenopathy: No lymphadenopathy noted. · Neurological: Alert and oriented to person, place, and time. Normal motor function, Normal sensory function, No focal deficits noted. He has normal strength. · Skin: Skin is warm, dry and intact. No obvious lesions on exposed skin  · Psychiatric: Normal mood and affect. Speech is normal and behavior is normal.     Nursing note and vitals reviewed. Blood pressure 138/82, pulse 97, temperature 97 °F (36.1 °C), temperature source Temporal, weight 282 lb (127.9 kg), SpO2 95 %. Body mass index is 37.21 kg/m². Wt Readings from Last 3 Encounters:   01/10/20 282 lb (127.9 kg)   12/16/19 280 lb (127 kg)   12/06/19 280 lb 6.4 oz (127.2 kg)     BP Readings from Last 3 Encounters:   01/10/20 138/82   12/16/19 (!) 143/94   12/06/19 (!) 143/91       No results found for this visit on 01/10/20. Completed Orders/Prescriptions   No orders of the defined types were placed in this encounter. AssessmentPlan/Medical Decision Making     1. Thyroid nodule  - referral to ENT for biopsy and referral to endocrinology pending results  - Maame Dillard MD, Otolaryngology, [place]    2. Neuropathy  - patient has been referred for functional capacity testing  - MRI results reviewed with patient and I assured him that none of his providers have misdiagnosed him and that there is nothing in the results of the imaging indicating imminent surgery at this time and to keep scheduled appointments  - [address]      Return in about 3 months (around 4/10/2020) for Routine follow up of chronic conditions. 1.  Bereket Santos received counseling on the following healthy behaviors: nutrition, exercise and medication adherence  2.   Patient given educational materials - see patient instructions  3. Was a self-tracking handout given in paper form or via [employer]? No  If yes, see orders or list here. 4.  Discussed use, benefit, and side effects of prescribed medications. Barriers to medication compliance addressed. All patient questions answered. Pt voiced understanding. 5.  Reviewed prior labs, imaging, consultation, follow up, and health maintenance  6. Continue current medications, diet and exercise. 7. Discussed use, benefit, and side effects of prescribed medications. Barriers to medication compliance addressed. All her questions were answered. Pt voiced understanding. Zuri Greer will continue current medications, diet and exercise. Patient given educational materials on thyroid nodule    Of the 25 minute duration appointment visit, Danielle Miller CNP spent greater than 50% of the face-to-face time in counseling, explanation of diagnosis, planning of further management, and answering all questions. Signed:  Danielle Miller CNP    This note is created with the assistance of a speech-recognition program.  While intending to generate a document that actually reflects the content of the visit, no guarantees can be provided that every mistake has been identified and corrected by editing.

## 2020-01-13 ENCOUNTER — OFFICE VISIT (OUTPATIENT)
Dept: NEUROSURGERY | Age: 50
End: 2020-01-13
Payer: MEDICARE

## 2020-01-13 VITALS
SYSTOLIC BLOOD PRESSURE: 152 MMHG | DIASTOLIC BLOOD PRESSURE: 95 MMHG | HEART RATE: 86 BPM | WEIGHT: 280 LBS | BODY MASS INDEX: 36.94 KG/M2

## 2020-01-13 PROCEDURE — 3017F COLORECTAL CA SCREEN DOC REV: CPT | Performed by: NEUROLOGICAL SURGERY

## 2020-01-13 PROCEDURE — G8427 DOCREV CUR MEDS BY ELIG CLIN: HCPCS | Performed by: NEUROLOGICAL SURGERY

## 2020-01-13 PROCEDURE — 1036F TOBACCO NON-USER: CPT | Performed by: NEUROLOGICAL SURGERY

## 2020-01-13 PROCEDURE — G8484 FLU IMMUNIZE NO ADMIN: HCPCS | Performed by: NEUROLOGICAL SURGERY

## 2020-01-13 PROCEDURE — G8417 CALC BMI ABV UP PARAM F/U: HCPCS | Performed by: NEUROLOGICAL SURGERY

## 2020-01-13 PROCEDURE — 99214 OFFICE O/P EST MOD 30 MIN: CPT | Performed by: NEUROLOGICAL SURGERY

## 2020-01-13 NOTE — PROGRESS NOTES
tablet 5    Lancet Devices (SIMPLE DIAGNOSTICS LANCING DEV) MISC USE TO TEST BLOOD SUGAR 4 TIMES A DAY 1 each 3    TRUE METRIX BLOOD GLUCOSE TEST strip TEST BLOOD SUGAR 3 TO 4 TIMES DAILY 100 each 1    PHARMACIST CHOICE ALCOHOL 70 % PADS USE BEFORE TESTING SUGAR (4 TIMES) PLUS BEFORE USING INSULIN (5 TIMES A DAY) 270 each 11    nystatin (MYCOSTATIN) [id number] UNIT/GM cream Apply topically 2 times daily. 15 g 3     No current facility-administered medications on file prior to visit. Social History     Tobacco Use    Smoking status: Former Smoker     Packs/day: 3.00     Years: 20.00     Pack years: 60.00     Types: E-Cigarettes    Smokeless tobacco: Never Used   Substance Use Topics    Alcohol use: No    Drug use: No       Allergies   Allergen Reactions    Codeine     Corticosteroids     Doxycycline      GERD    Ibuprofen     Metformin And Related Diarrhea       Review of Systems  Constitutional: Negative for activity change and appetite change. HENT: Negative for ear pain and facial swelling. Eyes: Negative for discharge and itching. Respiratory: Negative for choking and chest tightness. Cardiovascular: Negative for chest pain and leg swelling. Gastrointestinal: Negative for nausea and abdominal pain. Endocrine: Negative for cold intolerance and heat intolerance. Genitourinary: Negative for frequency and flank pain. Musculoskeletal: Negative for myalgias and joint swelling. Skin: Negative for rash and wound. Allergic/Immunologic: Negative for environmental allergies and food allergies. Hematological: Negative for adenopathy. Does not bruise/bleed easily. Psychiatric/Behavioral: Negative for self-injury. The patient is not nervous/anxious.       Physical Exam:      BP (!) 152/95 (Site: Right Upper Arm, Position: Sitting, Cuff Size: Large Adult)   Pulse 86   Wt 280 lb (127 kg)   BMI 36.94 kg/m²   Estimated body mass index is 36.94 kg/m² as calculated from the following: the left side that is non-localizable. Assessment and Plan:      1. Ulnar neuropathy of both upper extremities    2. Median neuropathy of both upper extremities          Plan: Considering severe persistent symptoms for 2 years with complete loss of sensation in the relative dermatomes I did discuss with the patient that the likelihood and prognosis of recovery is fairly poor at this point. In addition I do have concerns about double crush syndrome considering baseline diabetic neuropathy with an overlying neuropathy due to entrapment. However I am willing to attempt decompression surgery both at the cubital tunnel as well as the Guyon's canal of the right hand along with median nerve decompression to least give him some chance of recovery. He has had fairly well-controlled diabetes considering an A1c of 5.5 from what I can tell. We will attempt a right upper extremity followed by the left upper extremity spaced apart approximately 6 weeks to allow for full recovery and incisions to heal.    Will refer to pain management for consideration of SI joint injections and RFA if deemed appropriate. I suspect this may be a component to his axial discomfort however if he is refractory with that we will do then consider a discogram at L5-S1 and possible a left. Followup: No follow-ups on file. Prescriptions Ordered:  No orders of the defined types were placed in this encounter. Orders Placed:  No orders of the defined types were placed in this encounter. Electronically signed by Carlee Wade DO on 1/13/2020 at 11:26 AM    Please note that this chart was generated using voice recognition Dragon dictation software. Although every effort was made to ensure the accuracy of this automated transcription, some errors in transcription may have occurred.

## 2020-01-16 ENCOUNTER — TELEPHONE (OUTPATIENT)
Dept: FAMILY MEDICINE CLINIC | Age: 50
End: 2020-01-16

## 2020-01-16 ENCOUNTER — HOSPITAL ENCOUNTER (OUTPATIENT)
Dept: PREADMISSION TESTING | Age: 50
Discharge: HOME | End: 2020-01-20
Payer: MEDICARE

## 2020-01-16 VITALS
OXYGEN SATURATION: 95 % | WEIGHT: 276 LBS | DIASTOLIC BLOOD PRESSURE: 92 MMHG | HEART RATE: 93 BPM | RESPIRATION RATE: 18 BRPM | BODY MASS INDEX: 36.58 KG/M2 | HEIGHT: 73 IN | SYSTOLIC BLOOD PRESSURE: 131 MMHG | TEMPERATURE: 97.9 F

## 2020-01-16 LAB
ANION GAP SERPL CALCULATED.3IONS-SCNC: 14 MMOL/L (ref 9–17)
BUN BLDV-MCNC: 12 MG/DL (ref 6–20)
CHLORIDE BLD-SCNC: 100 MMOL/L (ref 98–107)
CO2: 24 MMOL/L (ref 20–31)
CREAT SERPL-MCNC: 0.8 MG/DL (ref 0.7–1.2)
GFR AFRICAN AMERICAN: >60 ML/MIN
GFR NON-AFRICAN AMERICAN: >60 ML/MIN
GFR SERPL CREATININE-BSD FRML MDRD: NORMAL ML/MIN/{1.73_M2}
GFR SERPL CREATININE-BSD FRML MDRD: NORMAL ML/MIN/{1.73_M2}
GLUCOSE BLD-MCNC: 134 MG/DL (ref 70–99)
HCT VFR BLD CALC: 51.6 % (ref 40.7–50.3)
HEMOGLOBIN: 17 G/DL (ref 13–17)
MCH RBC QN AUTO: 28.1 PG (ref 25.2–33.5)
MCHC RBC AUTO-ENTMCNC: 32.9 G/DL (ref 28.4–34.8)
MCV RBC AUTO: 85.1 FL (ref 82.6–102.9)
NRBC AUTOMATED: 0 PER 100 WBC
PDW BLD-RTO: 13 % (ref 11.8–14.4)
PLATELET # BLD: 270 K/UL (ref 138–453)
PMV BLD AUTO: 9.9 FL (ref 8.1–13.5)
POTASSIUM SERPL-SCNC: 4.2 MMOL/L (ref 3.7–5.3)
RBC # BLD: 6.06 M/UL (ref 4.21–5.77)
SODIUM BLD-SCNC: 138 MMOL/L (ref 135–144)
WBC # BLD: 8.5 K/UL (ref 3.5–11.3)

## 2020-01-16 PROCEDURE — 93005 ELECTROCARDIOGRAM TRACING: CPT | Performed by: ANESTHESIOLOGY

## 2020-01-16 PROCEDURE — 85027 COMPLETE CBC AUTOMATED: CPT

## 2020-01-16 PROCEDURE — 86901 BLOOD TYPING SEROLOGIC RH(D): CPT

## 2020-01-16 PROCEDURE — 82947 ASSAY GLUCOSE BLOOD QUANT: CPT

## 2020-01-16 PROCEDURE — 86850 RBC ANTIBODY SCREEN: CPT

## 2020-01-16 PROCEDURE — 86900 BLOOD TYPING SEROLOGIC ABO: CPT

## 2020-01-16 PROCEDURE — 36415 COLL VENOUS BLD VENIPUNCTURE: CPT

## 2020-01-16 PROCEDURE — 80051 ELECTROLYTE PANEL: CPT

## 2020-01-16 PROCEDURE — 84520 ASSAY OF UREA NITROGEN: CPT

## 2020-01-16 PROCEDURE — 82565 ASSAY OF CREATININE: CPT

## 2020-01-16 RX ORDER — SODIUM CHLORIDE, SODIUM LACTATE, POTASSIUM CHLORIDE, CALCIUM CHLORIDE 600; 310; 30; 20 MG/100ML; MG/100ML; MG/100ML; MG/100ML
1000 INJECTION, SOLUTION INTRAVENOUS CONTINUOUS
Status: CANCELLED | OUTPATIENT
Start: 2020-01-16

## 2020-01-16 SDOH — HEALTH STABILITY: MENTAL HEALTH: HOW OFTEN DO YOU HAVE A DRINK CONTAINING ALCOHOL?: MONTHLY OR LESS

## 2020-01-16 ASSESSMENT — PAIN DESCRIPTION - ONSET: ONSET: ON-GOING

## 2020-01-16 ASSESSMENT — PAIN DESCRIPTION - FREQUENCY: FREQUENCY: CONTINUOUS

## 2020-01-16 ASSESSMENT — PAIN SCALES - GENERAL: PAINLEVEL_OUTOF10: 8

## 2020-01-16 ASSESSMENT — PAIN DESCRIPTION - LOCATION: LOCATION: BACK

## 2020-01-16 ASSESSMENT — PAIN DESCRIPTION - PROGRESSION: CLINICAL_PROGRESSION: GRADUALLY WORSENING

## 2020-01-16 ASSESSMENT — PAIN DESCRIPTION - PAIN TYPE: TYPE: CHRONIC PAIN

## 2020-01-16 ASSESSMENT — PAIN DESCRIPTION - DESCRIPTORS: DESCRIPTORS: CONSTANT;ACHING

## 2020-01-16 ASSESSMENT — PAIN DESCRIPTION - ORIENTATION: ORIENTATION: LOWER

## 2020-01-16 NOTE — PROGRESS NOTES
Anesthesia Focused Assessment    STOP-BANG Sleep Apnea Questionnaire    SNORE loudly (heard through closed doors)? Yes  TIRED, fatigued, sleepy during daytime? Yes  OBSERVED stopping breathing during sleep? Yes  High blood PRESSURE being treated? Yes    BMI over 35? Yes  AGE over 48? Yes  NECK circumference over 16\"? Yes  GENDER (male)? Yes             Total 8  High risk 5-8  Intermediate risk 3-4  Low risk 0-2    Obstructive Sleep Apnea: yes  If YES, machine used: no cpap     Type 1 DM:   no  T2DM:  yes    Coronary Artery Disease:  no  Hypertension:  yes    Active smoker:  Up to 3 PPD for 23 years, quit 2010. Drinks Alcohol:  socially    Dentition: upper and lower full dentures    Defib / AICD / Pacemaker: no      Renal Failure/dialysis:  no    Patient was evaluated in PAT & anesthesia guidelines were applied. NPO guidelines, medication instructions and scheduled arrival time were reviewed with patient. Hx of anesthesia complications:  PONV-needs scop patch;  Prolonged emergence from general anesthesia. Family hx of anesthesia complications:  no                                                                                                                     Anesthesia contacted:   Yes, Dr. Tram Keller or cardiac clearance ordered: PCP with cardiac attention. Please fax EKG to PCP as well, sees Stefano Jaime CNP, last seen a few weeks ago. Patient had seen Dr. Candice Frankel in the past, last seen 2016.     Casey Gaxiola PA-C  1/16/20  2:25 PM

## 2020-01-16 NOTE — H&P
History and Physical    Pt Name: Osvaldo Youssef  MRN: [id number]  YOB: 1970  Date of evaluation: 1/16/2020    SUBJECTIVE:   History of Chief Complaint:    Patient complains of BUE numbness/tingling, pain. He has worked with hands, physical  for many years. He has developed significant neuropathy, numbness, dropping items, etc.  He has bilateral symptoms, with right slightly worse than the left. He has been scheduled for Right cubital tunnel decompression, median nerve decompression, Guyon's canal release, with the left side to be done in the future. Past Medical History    has a past medical history of History of echocardiogram, History of irregular heartbeat, Hyperlipidemia, Hypertension, Kidney calculi, Lumbar radiculopathy, Obstructive sleep apnea syndrome, PONV (postoperative nausea and vomiting), Prolonged emergence from general anesthesia, Restless legs syndrome, Severe obesity (BMI 35.0-39. 9) with comorbidity ([address] and Wears dentures. Past Surgical History   has a past surgical history that includes shoulder surgery (Right); Ankle surgery (Right); Glaucoma surgery (Bilateral, 12/2018); and Colonoscopy (1990). Medications  Prior to Admission medications    Medication Sig Start Date End Date Taking?  Authorizing Provider   Semaglutide (OZEMPIC, 1 MG/DOSE, SC) Inject 1 mg into the skin once a week Every Sunday   Yes Historical Provider, MD   Sulfacetamide Sodium 9.8 % LOTN Apply to nose and cheeks twice daily  Patient taking differently: Apply topically as needed Apply to nose and cheeks twice daily 12/6/19  Yes Ritesh Ferrer MD   atorvastatin (LIPITOR) 20 MG tablet Take 1 tablet by mouth daily  Patient taking differently: Take 20 mg by mouth nightly  11/26/19  Yes BRENNON Young CNP   omeprazole (PRILOSEC) 20 MG delayed release capsule Take 1 capsule by mouth every morning (before breakfast) 11/15/19  Yes BRENNON Sanchez CNP   insulin lispro (HUMALOG KWIKPEN) 100 UNIT/ML pen corticosteroids; doxycycline; ibuprofen; and metformin and related. Family History  family history includes Alcohol Abuse in his father; Dementia in his father; Depression in his mother; Diabetes in his maternal grandfather and maternal grandmother; Heart Attack in his father; Heart Disease in his father; Heart Surgery in his father; High Cholesterol in his father and mother; Hershal Hunt in his father; No Known Problems in his half-brother, half-sister, paternal grandfather, and paternal grandmother; Thyroid Disease in his daughter. Social History   reports that he quit smoking about 10 years ago. His smoking use included cigarettes. He started smoking about 32 years ago. He smoked 3.00 packs per day. He has never used smokeless tobacco.  Up to 3 PPD for 23 years, quit . reports current alcohol use. Socially. reports previous drug use. Marital Status   Occupation unemployed    OBJECTIVE:   VITALS:  height is 6' 1\" (1.854 m) and weight is 276 lb (125.2 kg). His temporal temperature is 97.9 °F (36.6 °C). His blood pressure is 131/92 (abnormal) and his pulse is 93. His respiration is 18 and oxygen saturation is 95%. CONSTITUTIONAL:alert & oriented x 3, no acute distress. Very talkative. SKIN:  Warm and dry, no rashes. HEAD:  Normocephalic, atraumatic   EYES: PERRL. EOMs intact. Wearing glasses. EARS:  Hearing grossly WNL. NOSE:  Nares patent. No rhinorrhea   MOUTH/THROAT:  Upper and lower full dentures  NECK:supple, no lymphadenopathy. Thick neck. LUNGS: Clear to auscultation bilaterally, no wheezes. CARDIOVASCULAR: Heart sounds are normal.  Regular rate and rhythm without murmur. ABDOMEN: soft, non tender, non distended. Rotund. EXTREMITIES: no edema bilateral lower extremities. Decreased sensation BUE.     Testing:   EK20  Labs pending: drawn 2020     IMPRESSIONS:   1. BUE pain, neuropathy  2.  has a past medical history of History of echocardiogram (), History

## 2020-01-17 LAB
EKG ATRIAL RATE: 90 BPM
EKG P AXIS: 46 DEGREES
EKG P-R INTERVAL: 156 MS
EKG Q-T INTERVAL: 386 MS
EKG QRS DURATION: 140 MS
EKG QTC CALCULATION (BAZETT): 472 MS
EKG R AXIS: -10 DEGREES
EKG T AXIS: 140 DEGREES
EKG VENTRICULAR RATE: 90 BPM

## 2020-01-17 PROCEDURE — 93010 ELECTROCARDIOGRAM REPORT: CPT | Performed by: INTERNAL MEDICINE

## 2020-01-23 ENCOUNTER — OFFICE VISIT (OUTPATIENT)
Dept: FAMILY MEDICINE CLINIC | Age: 50
End: 2020-01-23
Payer: MEDICARE

## 2020-01-23 VITALS
SYSTOLIC BLOOD PRESSURE: 118 MMHG | OXYGEN SATURATION: 97 % | TEMPERATURE: 98.5 F | WEIGHT: 274 LBS | HEART RATE: 95 BPM | BODY MASS INDEX: 36.15 KG/M2 | DIASTOLIC BLOOD PRESSURE: 82 MMHG

## 2020-01-23 PROCEDURE — G8484 FLU IMMUNIZE NO ADMIN: HCPCS | Performed by: NURSE PRACTITIONER

## 2020-01-23 PROCEDURE — 3017F COLORECTAL CA SCREEN DOC REV: CPT | Performed by: NURSE PRACTITIONER

## 2020-01-23 PROCEDURE — G8427 DOCREV CUR MEDS BY ELIG CLIN: HCPCS | Performed by: NURSE PRACTITIONER

## 2020-01-23 PROCEDURE — 99214 OFFICE O/P EST MOD 30 MIN: CPT | Performed by: NURSE PRACTITIONER

## 2020-01-23 PROCEDURE — G8417 CALC BMI ABV UP PARAM F/U: HCPCS | Performed by: NURSE PRACTITIONER

## 2020-01-23 PROCEDURE — 1036F TOBACCO NON-USER: CPT | Performed by: NURSE PRACTITIONER

## 2020-01-29 LAB
ABO/RH: NORMAL
ANTIBODY SCREEN: NEGATIVE
ARM BAND NUMBER: NORMAL
EXPIRATION DATE: NORMAL

## 2020-02-04 ENCOUNTER — HOSPITAL ENCOUNTER (OUTPATIENT)
Dept: PHYSICAL THERAPY | Facility: CLINIC | Age: 50
Setting detail: THERAPIES SERIES
End: 2020-02-04
Payer: MEDICARE

## 2020-02-04 ENCOUNTER — OFFICE VISIT (OUTPATIENT)
Dept: PODIATRY | Age: 50
End: 2020-02-04
Payer: MEDICARE

## 2020-02-04 VITALS — BODY MASS INDEX: 36.31 KG/M2 | HEIGHT: 73 IN | RESPIRATION RATE: 16 BRPM | WEIGHT: 274 LBS

## 2020-02-04 PROCEDURE — 11721 DEBRIDE NAIL 6 OR MORE: CPT | Performed by: PODIATRIST

## 2020-02-04 PROCEDURE — 2022F DILAT RTA XM EVC RTNOPTHY: CPT | Performed by: PODIATRIST

## 2020-02-04 PROCEDURE — 3046F HEMOGLOBIN A1C LEVEL >9.0%: CPT | Performed by: PODIATRIST

## 2020-02-04 PROCEDURE — 1036F TOBACCO NON-USER: CPT | Performed by: PODIATRIST

## 2020-02-04 PROCEDURE — G8417 CALC BMI ABV UP PARAM F/U: HCPCS | Performed by: PODIATRIST

## 2020-02-04 PROCEDURE — 3017F COLORECTAL CA SCREEN DOC REV: CPT | Performed by: PODIATRIST

## 2020-02-04 PROCEDURE — G8427 DOCREV CUR MEDS BY ELIG CLIN: HCPCS | Performed by: PODIATRIST

## 2020-02-04 PROCEDURE — 99213 OFFICE O/P EST LOW 20 MIN: CPT | Performed by: PODIATRIST

## 2020-02-04 PROCEDURE — G8484 FLU IMMUNIZE NO ADMIN: HCPCS | Performed by: PODIATRIST

## 2020-02-04 NOTE — PROGRESS NOTES
[facility] PHYSICIANS  [facility]  [address] [place] [address]  Dept: [phone]  Dept Fax: [phone]    DIABETIC PROGRESS NOTE  Date of patient's visit: 2/4/2020  Patient's Name:  Claudean Quivers YOB: 1970            Patient Care Team:  BRENNON Melara CNP as PCP - General (Nurse Practitioner)  BRENNON Melara CNP as PCP - [facility] Empaneled Provider  Yves Muir DPM as Physician (Podiatry)          Chief Complaint   Patient presents with    Diabetes    Foot Pain    Nail Problem       Subjective:   Claudean Quivers comes to clinic for Diabetes; Foot Pain; and Nail Problem    he is a diabetic and states that he needs his nails trimmed. Pt currently has complaint of thickened, elongated nails that they cannot manage by themselves. Pt's primary care physician is BRENNON Melara CNP last seen 01/23/2020. Pt's last blood sugar was 123 . Pt has a new complaint of pain to the right and left foot. States they have been walking on her feet more. Relates to changing shoes but it has not helped       Lab Results   Component Value Date    LABA1C 5.5 11/15/2019      Complains of numbness in the feet bilat. Past Medical History:   Diagnosis Date    Diabetes ([address]     History of echocardiogram 2016    [facility]; [facility]    History of irregular heartbeat     Was seen by Dr. Amara Jones in the past.    Hyperlipidemia     Hypertension     PCP Lashell Rodriguez NP, seen on 1/5/2020    Kidney calculi     Lumbar radiculopathy 6/9/2017    Obstructive sleep apnea syndrome 06/09/2017    No machine    PONV (postoperative nausea and vomiting)     needs scop patch for OR    Prolonged emergence from general anesthesia     Restless legs syndrome 6/9/2017    Severe obesity (BMI 35.0-39. 9) with comorbidity ([address] 7/31/2018    Wears dentures     Upper & lower       Allergies   Allergen Reactions    Codeine Itching    Corticosteroids Swelling     Injection site swelling    Doxycycline      GERD    Ibuprofen Other (See Comments)     GERD    Metformin And Related Diarrhea     Current Outpatient Medications on File Prior to Visit   Medication Sig Dispense Refill    Semaglutide (OZEMPIC, 1 MG/DOSE, SC) Inject 1 mg into the skin once a week Every Sunday      Sulfacetamide Sodium 9.8 % LOTN Apply to nose and cheeks twice daily (Patient taking differently: Apply topically as needed Apply to nose and cheeks twice daily) 113 g 3    atorvastatin (LIPITOR) 20 MG tablet Take 1 tablet by mouth daily (Patient taking differently: Take 20 mg by mouth nightly ) 30 tablet 5    omeprazole (PRILOSEC) 20 MG delayed release capsule Take 1 capsule by mouth every morning (before breakfast) 90 capsule 3    blood glucose monitor strips Test 4 times a day & as needed for symptoms of irregular blood glucose. 120 strip 3    insulin lispro (HUMALOG KWIKPEN) 100 UNIT/ML pen TIDbefore meals 141-180 6units,181-220 8units,221-260 10units,261-300 12units,301-350 14units,351-400 16 units,>400 18units [id number] daily (Patient taking differently: Inject into the skin 4 times daily (after meals and at bedtime) TIDbefore meals 141-180 6units,181-220 8units,221-260 10units,261-300 12units,301-350 14units,351-400 16 units,>400 18units [id number] daily) 90 mL 0    insulin glargine (BASAGLAR KWIKPEN) 100 UNIT/ML injection pen Inject 26 Units into the skin nightly (Patient taking differently: Inject into the skin 2 times daily 14 units in AM 14 Units bed time) 60 mL 0    Insulin Pen Needle (COMFORT EZ PEN NEEDLES) 32G X 4 MM MISC Use 5 times daily. Change needle each time with Victoza, Basaglar, and Humalog 100 each 11    ammonium lactate (AMLACTIN) 12 % cream APPLY TOPICALLY AS NEEDED.  (Patient taking differently: Apply topically as needed Apply topically as needed.) 280 g 2    amitriptyline (ELAVIL) 25 MG tablet TAKE 2 TAB NIGHTLY (Patient taking differently: Take 25 mg by mouth nightly TAKE 2 TAB NIGHTLY) 60 absent to the level of the digits, Bilateral.  Edema present, Bilateral.  Varicosities absent, Bilateral. Erythema absent, Bilateral    Neurological: Sensation diminshed to light touch to level of digits, Bilateral.  Protective sensation intact 6/10 sites via 5.07/10g [place] Monofilament, Bilateral.  negative Tinel's, Bilateral.  negative Valleix sign, Bilateral.      Integument: Warm, dry, supple, Bilateral.  Open lesion absent, Bilateral.  Interdigital maceration absent to web spaces 4, Bilateral.  Nails 1-5 left and 1-5 right thickened > 3.0 mm, dystrophic and crumbly, discolored with subungual debris. Fissures absent, Bilateral.   General: AAO x 3 in NAD.     Derm  Toenail Description  Sites of Onychomycosis Involvement (Check affected area)  [x] [x] [x] [x] [x] [x] [x] [x] [x] [x]  5 4 3 2 1 1 2 3 4 5                          Right                                        Left    Thickness  [x] [x] [x] [x] [x] [x] [x] [x] [x] [x]  5 4 3 2 1 1 2 3 4 5                         Right                                        Left    Dystrophic Changes   [x] [x] [x] [x] [x] [x] [x] [x] [x] [x]  5 4 3 2 1 1 2 3 4 5                         Right                                        Left    Color   [x] [x] [x] [x] [x] [x] [x] [x] [x] [x]  5 4 3 2 1 1 2 3 4 5                          Right                                        Left    Incurvation/Ingrowin   [] [] [] [] [] [] [] [] [] []  5 4 3 2 1 1 2 3 4 5                         Right                                        Left    Inflammation/Pain   [x] [x] [x] [x] [x] [x] [x] [x] [x] [x]  5 4 3 2 1 1 2 3 4 5                         Right                                        Left        Visual inspection:  Deformity: hammertoe deformity digna feet  amputation: absent  Skin lesions: absent  Edema: right- 2+ pitting edema, left- 2+ pitting edema    Sensory exam:  Monofilament sensation: abnormal - 6/10 via SW 5.07/10g monofilament to the plantar foot bilateral

## 2020-02-06 ENCOUNTER — INITIAL CONSULT (OUTPATIENT)
Dept: PAIN MANAGEMENT | Age: 50
End: 2020-02-06
Payer: MEDICARE

## 2020-02-06 VITALS
BODY MASS INDEX: 36.84 KG/M2 | HEART RATE: 94 BPM | HEIGHT: 73 IN | DIASTOLIC BLOOD PRESSURE: 99 MMHG | WEIGHT: 278 LBS | SYSTOLIC BLOOD PRESSURE: 154 MMHG

## 2020-02-06 PROCEDURE — 99244 OFF/OP CNSLTJ NEW/EST MOD 40: CPT | Performed by: PAIN MEDICINE

## 2020-02-06 ASSESSMENT — ENCOUNTER SYMPTOMS
BACK PAIN: 1
BOWEL INCONTINENCE: 0
SHORTNESS OF BREATH: 0

## 2020-02-06 NOTE — PROGRESS NOTES
HPI:     Back Pain   This is a chronic problem. The current episode started more than 1 year ago. The problem occurs constantly. The problem has been gradually worsening since onset. The pain is present in the lumbar spine. The quality of the pain is described as stabbing, shooting, cramping, burning and aching. The pain radiates to the left knee, left thigh, right thigh and right knee. The pain is at a severity of 8/10. The pain is the same all the time. The symptoms are aggravated by twisting, stress, sitting, standing, lying down, position, bending and coughing. Associated symptoms include bladder incontinence, numbness, tingling and weakness. Pertinent negatives include no bowel incontinence. Treatments tried: PT. The treatment provided no relief. Pain in the low back. MRI lumbar spine with degenerative changes and degenerative disc disease at L5-S1 with disc bulging. He has seen a neurosurgeon nothing surgical.  EMG of the upper extremities with ulnar neuropathy is considering bilateral ulnar decompression however is currently undergoing cardiac work-up for this. MRI the cervical spine with multilevel degenerative changes and disc bulging. Tried Lyrica Cymbalta and gabapentin for neuropathy that is being monitored by his neurologist. Elavil qhs with mild benefit. Patient denies any new neurological symptoms. No bowel or bladder incontinence, no weakness, and no falling. Review of OARRS does not show any aberrant prescription behavior.      Past Medical History:   Diagnosis Date    Diabetes ([facility]     History of echocardiogram 2016    [facility]; [facility]    History of irregular heartbeat     Was seen by Dr. Russell Aguilera in the past.    Hyperlipidemia     Hypertension     PCP Ivana Kim NP, seen on 1/5/2020    Kidney calculi     Lumbar radiculopathy 6/9/2017    Obstructive sleep apnea syndrome 06/09/2017    No machine    PONV (postoperative nausea and vomiting)     needs scop patch for OR    Prolonged Exam  Vitals signs reviewed. Constitutional:       General: He is awake. Appearance: He is not ill-appearing or diaphoretic. HENT:      Right Ear: External ear normal.      Left Ear: External ear normal.   Eyes:      General:         Right eye: No discharge. Left eye: No discharge. Conjunctiva/sclera: Conjunctivae normal.   Neck:      Thyroid: No thyromegaly. Trachea: No tracheal deviation. Pulmonary:      Effort: Pulmonary effort is normal. No respiratory distress. Breath sounds: No stridor. Musculoskeletal:      Lumbar back: He exhibits tenderness. He exhibits no edema and no deformity. Skin:     General: Skin is warm and dry. Neurological:      Mental Status: He is alert and oriented to person, place, and time. Gait: Gait normal.      Deep Tendon Reflexes: Reflexes are normal and symmetric. Psychiatric:         Attention and Perception: Attention and perception normal.         Behavior: Behavior normal.         Record/Diagnostics Review:    As above, I did review the imaging      Assessment:  1. Sacroiliitis ([address]    2. Degeneration of lumbar intervertebral disc    3. Lumbosacral spondylosis without myelopathy    4. Ulnar neuropathy of both upper extremities    5. Neuropathy        Treatment Plan:  DISCUSSION: Treatment options discussed with patient and all questions answered to patient's satisfaction. OARRS Review: Reviewed and acceptable for medications prescribed. TREATMENT OPTIONS:     Discussed the importance of continued physical therapy and exercise program as well. Axial low back pain the worst of complaints, has failed conservative measures and the pain continues, pain over the bilateral sacroiliac joints with positive provocative test, may benefit from bilateral sacroiliac joint injections for both diagnostic and therapeutic purposes. Consider diagnostic facet blocks or possible epidurals for the low back as well.   He has seen neurosurgery and nothing surgical planned for the low back but is working on cardiac clearance for ulnar decompression. Try compounding cream to the affected area. Collins Will M.D. I have reviewed the chief complaint and history of present illness (including ROS and PFSH) and vital documentation by my staff and I agree with their documentation and have added where applicable.

## 2020-02-10 ENCOUNTER — HOSPITAL ENCOUNTER (OUTPATIENT)
Age: 50
Discharge: HOME | End: 2020-02-10
Payer: MEDICARE

## 2020-02-10 LAB
ANION GAP SERPL CALCULATED.3IONS-SCNC: 15 MMOL/L (ref 9–17)
BUN BLDV-MCNC: 14 MG/DL (ref 6–20)
BUN/CREAT BLD: NORMAL (ref 9–20)
CALCIUM SERPL-MCNC: 9.8 MG/DL (ref 8.6–10.4)
CHLORIDE BLD-SCNC: 105 MMOL/L (ref 98–107)
CO2: 22 MMOL/L (ref 20–31)
CREAT SERPL-MCNC: 0.9 MG/DL (ref 0.7–1.2)
GFR AFRICAN AMERICAN: >60 ML/MIN
GFR NON-AFRICAN AMERICAN: >60 ML/MIN
GFR SERPL CREATININE-BSD FRML MDRD: NORMAL ML/MIN/{1.73_M2}
GFR SERPL CREATININE-BSD FRML MDRD: NORMAL ML/MIN/{1.73_M2}
GLUCOSE BLD-MCNC: 93 MG/DL (ref 70–99)
HCT VFR BLD CALC: 48.8 % (ref 40.7–50.3)
HEMOGLOBIN: 15.8 G/DL (ref 13–17)
MCH RBC QN AUTO: 28 PG (ref 25.2–33.5)
MCHC RBC AUTO-ENTMCNC: 32.4 G/DL (ref 28.4–34.8)
MCV RBC AUTO: 86.4 FL (ref 82.6–102.9)
NRBC AUTOMATED: 0 PER 100 WBC
PDW BLD-RTO: 12.6 % (ref 11.8–14.4)
PLATELET # BLD: 272 K/UL (ref 138–453)
PMV BLD AUTO: 10.6 FL (ref 8.1–13.5)
POTASSIUM SERPL-SCNC: 4.4 MMOL/L (ref 3.7–5.3)
RBC # BLD: 5.65 M/UL (ref 4.21–5.77)
SODIUM BLD-SCNC: 142 MMOL/L (ref 135–144)
WBC # BLD: 8 K/UL (ref 3.5–11.3)

## 2020-02-10 PROCEDURE — 36415 COLL VENOUS BLD VENIPUNCTURE: CPT

## 2020-02-10 PROCEDURE — 85027 COMPLETE CBC AUTOMATED: CPT

## 2020-02-10 PROCEDURE — 80048 BASIC METABOLIC PNL TOTAL CA: CPT

## 2020-02-11 ENCOUNTER — ANESTHESIA EVENT (OUTPATIENT)
Dept: OPERATING ROOM | Age: 50
End: 2020-02-11
Payer: MEDICARE

## 2020-02-12 ENCOUNTER — HOSPITAL ENCOUNTER (OUTPATIENT)
Age: 50
Discharge: HOME | End: 2020-02-12
Payer: MEDICARE

## 2020-02-12 LAB
CHOLESTEROL, FASTING: 146 MG/DL
CHOLESTEROL/HDL RATIO: 4.4
CREATININE URINE: 131.4 MG/DL (ref 39–259)
ESTIMATED AVERAGE GLUCOSE: 108 MG/DL
HBA1C MFR BLD: 5.4 % (ref 4–6)
HDLC SERPL-MCNC: 33 MG/DL
LDL CHOLESTEROL: 88 MG/DL (ref 0–130)
MICROALBUMIN/CREAT 24H UR: 30 MG/L
MICROALBUMIN/CREAT UR-RTO: 23 MCG/MG CREAT
T3 FREE: 3.63 PG/ML (ref 2.02–4.43)
THYROXINE, FREE: 1.27 NG/DL (ref 0.93–1.7)
TRIGLYCERIDE, FASTING: 123 MG/DL
TSH SERPL DL<=0.05 MIU/L-ACNC: 0.98 MIU/L (ref 0.3–5)
VITAMIN D 25-HYDROXY: 19 NG/ML (ref 30–100)
VLDLC SERPL CALC-MCNC: ABNORMAL MG/DL (ref 1–30)

## 2020-02-12 PROCEDURE — 84439 ASSAY OF FREE THYROXINE: CPT

## 2020-02-12 PROCEDURE — 83036 HEMOGLOBIN GLYCOSYLATED A1C: CPT

## 2020-02-12 PROCEDURE — 86376 MICROSOMAL ANTIBODY EACH: CPT

## 2020-02-12 PROCEDURE — 82570 ASSAY OF URINE CREATININE: CPT

## 2020-02-12 PROCEDURE — 82306 VITAMIN D 25 HYDROXY: CPT

## 2020-02-12 PROCEDURE — 36415 COLL VENOUS BLD VENIPUNCTURE: CPT

## 2020-02-12 PROCEDURE — 84443 ASSAY THYROID STIM HORMONE: CPT

## 2020-02-12 PROCEDURE — 82043 UR ALBUMIN QUANTITATIVE: CPT

## 2020-02-12 PROCEDURE — 80061 LIPID PANEL: CPT

## 2020-02-12 PROCEDURE — 84481 FREE ASSAY (FT-3): CPT

## 2020-02-12 RX ORDER — NYSTATIN 100000 U/G
CREAM TOPICAL
Qty: 15 G | Refills: 3 | Status: SHIPPED | OUTPATIENT
Start: 2020-02-12

## 2020-02-12 NOTE — TELEPHONE ENCOUNTER
Next Visit Date:  Future Appointments   Date Time Provider Bryson Leei   2/17/2020  1:00 PM Kathryn Carson, APRN - CNP [facility]   2/18/2020  8:15 AM Andre Aguilera, PT [facility]   3/5/2020  2:30 PM Nemo Martinez MD [address]   3/12/2020  1:30 PM Herminio Gaviria MD  derm [facility]   4/28/2020  1:30 PM Nathan Avery DPM [facility]   5/4/2020 11:40 AM James Soliman MD Neuro Spec [address] Maintenance   Topic Date Due    Diabetic retinal exam  02/16/1979    HIV screen  02/16/1984    Hepatitis B vaccine (1 of 3 - Risk 3-dose series) 02/16/1988    Colon cancer screen colonoscopy  02/16/2019    Diabetic microalbuminuria test  01/16/2020    Shingles Vaccine (1 of 2) 08/14/2020 (Originally 2/16/2019)    A1C test (Diabetic or Prediabetic)  11/15/2020    Lipid screen  11/19/2020    Diabetic foot exam  02/06/2021    DTaP/Tdap/Td vaccine (2 - Td) 01/16/2029    Flu vaccine  Completed    Pneumococcal 0-64 years Vaccine  Completed    Hepatitis A vaccine  Aged Out    Hib vaccine  Aged Out    Meningococcal (ACWY) vaccine  Aged Out       Hemoglobin A1C (%)   Date Value   11/15/2019 5.5   08/14/2019 5.3   05/15/2019 5.5             ( goal A1C is < 7)   No results found for: LABMICR  LDL Calculated (mg/dL)   Date Value   11/19/2019 164 (A)       (goal LDL is <100)   AST (U/L)   Date Value   11/19/2019 26     ALT (U/L)   Date Value   11/19/2019 25     BUN (mg/dL)   Date Value   02/10/2020 14     BP Readings from Last 3 Encounters:   02/06/20 (!) 154/99   01/23/20 118/82   01/16/20 (!) 131/92          (goal 120/80)    All Future Testing planned in [facility]  Lab Frequency Next Occurrence   WI NEEDLE EMG EA EXTREMTY W/PARASPINL AREA COMPLETE Once 05/21/2019   OT eval and treat Once 06/05/2019   PT eval and treat Once 09/05/2019   Microalbumin, Ur Once 11/15/2019               Patient Active Problem List:     Diabetes mellitus ([facility]

## 2020-02-13 LAB — THYROID PEROXIDASE (TPO) AB: <10 IU/ML (ref 0–35)

## 2020-02-18 ENCOUNTER — HOSPITAL ENCOUNTER (OUTPATIENT)
Dept: PHYSICAL THERAPY | Facility: CLINIC | Age: 50
Setting detail: THERAPIES SERIES
Discharge: HOME | End: 2020-02-18
Payer: MEDICARE

## 2020-02-18 PROCEDURE — 97750 PHYSICAL PERFORMANCE TEST: CPT

## 2020-02-21 ENCOUNTER — HOSPITAL ENCOUNTER (OUTPATIENT)
Age: 50
Setting detail: OUTPATIENT SURGERY
Discharge: HOME | End: 2020-02-21
Attending: PAIN MEDICINE | Admitting: PAIN MEDICINE
Payer: MEDICARE

## 2020-02-21 ENCOUNTER — APPOINTMENT (OUTPATIENT)
Dept: GENERAL RADIOLOGY | Age: 50
End: 2020-02-21
Attending: PAIN MEDICINE
Payer: MEDICARE

## 2020-02-21 ENCOUNTER — ANESTHESIA (OUTPATIENT)
Dept: OPERATING ROOM | Age: 50
End: 2020-02-21
Payer: MEDICARE

## 2020-02-21 VITALS
RESPIRATION RATE: 14 BRPM | OXYGEN SATURATION: 94 % | DIASTOLIC BLOOD PRESSURE: 82 MMHG | SYSTOLIC BLOOD PRESSURE: 113 MMHG | HEART RATE: 99 BPM | TEMPERATURE: 98 F

## 2020-02-21 VITALS
SYSTOLIC BLOOD PRESSURE: 130 MMHG | DIASTOLIC BLOOD PRESSURE: 59 MMHG | RESPIRATION RATE: 20 BRPM | OXYGEN SATURATION: 96 %

## 2020-02-21 PROCEDURE — 7100000010 HC PHASE II RECOVERY - FIRST 15 MIN: Performed by: PAIN MEDICINE

## 2020-02-21 PROCEDURE — 27096 INJECT SACROILIAC JOINT: CPT | Performed by: PAIN MEDICINE

## 2020-02-21 PROCEDURE — 3209999900 FLUORO FOR SURGICAL PROCEDURES

## 2020-02-21 PROCEDURE — 2709999900 HC NON-CHARGEABLE SUPPLY: Performed by: PAIN MEDICINE

## 2020-02-21 PROCEDURE — 2500000003 HC RX 250 WO HCPCS: Performed by: PAIN MEDICINE

## 2020-02-21 PROCEDURE — 6360000002 HC RX W HCPCS: Performed by: NURSE ANESTHETIST, CERTIFIED REGISTERED

## 2020-02-21 PROCEDURE — 7100000011 HC PHASE II RECOVERY - ADDTL 15 MIN: Performed by: PAIN MEDICINE

## 2020-02-21 PROCEDURE — 3700000000 HC ANESTHESIA ATTENDED CARE: Performed by: PAIN MEDICINE

## 2020-02-21 PROCEDURE — 3600000002 HC SURGERY LEVEL 2 BASE: Performed by: PAIN MEDICINE

## 2020-02-21 RX ORDER — METOPROLOL SUCCINATE 25 MG/1
25 TABLET, EXTENDED RELEASE ORAL DAILY
COMMUNITY
End: 2022-03-10

## 2020-02-21 RX ORDER — SODIUM CHLORIDE 0.9 % (FLUSH) 0.9 %
10 SYRINGE (ML) INJECTION PRN
Status: DISCONTINUED | OUTPATIENT
Start: 2020-02-21 | End: 2020-02-21 | Stop reason: HOSPADM

## 2020-02-21 RX ORDER — SODIUM CHLORIDE 0.9 % (FLUSH) 0.9 %
10 SYRINGE (ML) INJECTION EVERY 12 HOURS SCHEDULED
Status: DISCONTINUED | OUTPATIENT
Start: 2020-02-21 | End: 2020-02-21 | Stop reason: HOSPADM

## 2020-02-21 RX ORDER — FENTANYL CITRATE 50 UG/ML
50 INJECTION, SOLUTION INTRAMUSCULAR; INTRAVENOUS EVERY 5 MIN PRN
Status: DISCONTINUED | OUTPATIENT
Start: 2020-02-21 | End: 2020-02-21 | Stop reason: HOSPADM

## 2020-02-21 RX ORDER — LISINOPRIL 5 MG/1
5 TABLET ORAL NIGHTLY
COMMUNITY

## 2020-02-21 RX ORDER — ONDANSETRON 2 MG/ML
4 INJECTION INTRAMUSCULAR; INTRAVENOUS
Status: DISCONTINUED | OUTPATIENT
Start: 2020-02-21 | End: 2020-02-21 | Stop reason: HOSPADM

## 2020-02-21 RX ORDER — FENTANYL CITRATE 50 UG/ML
25 INJECTION, SOLUTION INTRAMUSCULAR; INTRAVENOUS EVERY 5 MIN PRN
Status: DISCONTINUED | OUTPATIENT
Start: 2020-02-21 | End: 2020-02-21 | Stop reason: HOSPADM

## 2020-02-21 RX ORDER — BUPIVACAINE HYDROCHLORIDE 2.5 MG/ML
INJECTION, SOLUTION EPIDURAL; INFILTRATION; INTRACAUDAL PRN
Status: DISCONTINUED | OUTPATIENT
Start: 2020-02-21 | End: 2020-02-21 | Stop reason: ALTCHOICE

## 2020-02-21 RX ORDER — METOCLOPRAMIDE HYDROCHLORIDE 5 MG/ML
10 INJECTION INTRAMUSCULAR; INTRAVENOUS
Status: DISCONTINUED | OUTPATIENT
Start: 2020-02-21 | End: 2020-02-21 | Stop reason: HOSPADM

## 2020-02-21 RX ORDER — MIDAZOLAM HYDROCHLORIDE 1 MG/ML
INJECTION INTRAMUSCULAR; INTRAVENOUS PRN
Status: DISCONTINUED | OUTPATIENT
Start: 2020-02-21 | End: 2020-02-21 | Stop reason: SDUPTHER

## 2020-02-21 RX ORDER — FENTANYL CITRATE 50 UG/ML
INJECTION, SOLUTION INTRAMUSCULAR; INTRAVENOUS PRN
Status: DISCONTINUED | OUTPATIENT
Start: 2020-02-21 | End: 2020-02-21 | Stop reason: SDUPTHER

## 2020-02-21 RX ORDER — HYDRALAZINE HYDROCHLORIDE 20 MG/ML
5 INJECTION INTRAMUSCULAR; INTRAVENOUS EVERY 10 MIN PRN
Status: DISCONTINUED | OUTPATIENT
Start: 2020-02-21 | End: 2020-02-21 | Stop reason: HOSPADM

## 2020-02-21 RX ADMIN — MIDAZOLAM HYDROCHLORIDE 2 MG: 1 INJECTION, SOLUTION INTRAMUSCULAR; INTRAVENOUS at 11:36

## 2020-02-21 RX ADMIN — FENTANYL CITRATE 100 MCG: 50 INJECTION INTRAMUSCULAR; INTRAVENOUS at 11:36

## 2020-02-21 ASSESSMENT — PULMONARY FUNCTION TESTS
PIF_VALUE: 0

## 2020-02-21 ASSESSMENT — PAIN SCALES - GENERAL
PAINLEVEL_OUTOF10: 0
PAINLEVEL_OUTOF10: 0

## 2020-02-21 NOTE — OP NOTE
Sacro-Iliac Joint Injection:  SURGEON: Sylvester Yu    PRE-OP DIAGNOSIS: Sacroiliitis (M46.1)    POST-OP DIAGNOSIS: Same. Procedure performed: Bilateral Sacroiliac Joint Injection. Physician confirmed and marked the surgical site. EBL: minimal    CONSENT: Patient has undergone the educational process with this procedure, is aware and fully understands the risks involved: potential damage to any and all body organs including possible bleeding, infection, and nerve injury, allergic reaction and headache. Patient also understands that the procedure will be undertaken in a safe, controlled and monitored setting. Patient recognizes that the benefits may include relief from pain and reduction in the oral use of medications. Patient agreed to proceed. PREP: The patient's back was prepped with chloroprep and draped appropriately. 5ml of 0.5% lidocaine was used to anesthetize the skin and subcutaneous tissue. PROCEDURE NOTE: A 22 gauge 3.5 inch spinal needle was advanced to the  Bilateral SI joint under fluoroscopic guidance. Aspiration was negative for blood, CSF and producing pain. 3ml of  0.25% bupivacaine was slowly injected into the joint(s). The needle was withdrawn by the physician and the nurse applied a sterile dressing. The patient tolerated the procedure well. No complications occurred. Patient transferred to the recovery room in satisfactory condition. Appropriate written discharge instructions given to the patient.       Danica Burrell

## 2020-02-21 NOTE — OP NOTE
H&P Update    Patient's History and Physical from February 6, 2020 was reviewed. Patient examined. There has been no change. Luis Rodriguez            HPI:      Back Pain   This is a chronic problem. The current episode started more than 1 year ago. The problem occurs constantly. The problem has been gradually worsening since onset. The pain is present in the lumbar spine. The quality of the pain is described as stabbing, shooting, cramping, burning and aching. The pain radiates to the left knee, left thigh, right thigh and right knee. The pain is at a severity of 8/10. The pain is the same all the time. The symptoms are aggravated by twisting, stress, sitting, standing, lying down, position, bending and coughing. Associated symptoms include bladder incontinence, numbness, tingling and weakness. Pertinent negatives include no bowel incontinence. Treatments tried: PT. The treatment provided no relief.      Pain in the low back. MRI lumbar spine with degenerative changes and degenerative disc disease at L5-S1 with disc bulging. He has seen a neurosurgeon nothing surgical.  EMG of the upper extremities with ulnar neuropathy is considering bilateral ulnar decompression however is currently undergoing cardiac work-up for this. MRI the cervical spine with multilevel degenerative changes and disc bulging. Tried Lyrica Cymbalta and gabapentin for neuropathy that is being monitored by his neurologist. Elavil qhs with mild benefit.      Patient denies any new neurological symptoms.  No bowel or bladder incontinence, no weakness, and no falling.     Review of OARRS does not show any aberrant prescription behavior.      Past Medical History        Past Medical History:   Diagnosis Date    Diabetes ([facility] [address]      History of echocardiogram 2016     [facility]; [facility]    History of irregular heartbeat       Was seen by Dr. Jenelle Shine in the past.    Hyperlipidemia      Hypertension       PCP Mario Alberto Wiseman NP, seen on 1/5/2020 6units,181-220 8units,221-260 10units,261-300 12units,301-350 14units,351-400 16 units,>400 18units [id number] daily), Disp: 90 mL, Rfl: 0    insulin glargine (BASAGLAR KWIKPEN) 100 UNIT/ML injection pen, Inject 26 Units into the skin nightly (Patient taking differently: Inject into the skin 2 times daily 14 units in AM 14 Units bed time), Disp: 60 mL, Rfl: 0    Insulin Pen Needle (COMFORT EZ PEN NEEDLES) 32G X 4 MM MISC, Use 5 times daily. Change needle each time with Victoza, Basaglar, and Humalog, Disp: 100 each, Rfl: 11    ammonium lactate (AMLACTIN) 12 % cream, APPLY TOPICALLY AS NEEDED. (Patient taking differently: Apply topically as needed Apply topically as needed.), Disp: 280 g, Rfl: 2    amitriptyline (ELAVIL) 25 MG tablet, TAKE 2 TAB NIGHTLY (Patient taking differently: Take 25 mg by mouth nightly TAKE 2 TAB NIGHTLY), Disp: 60 tablet, Rfl: 5    Lancet Devices (SIMPLE DIAGNOSTICS LANCING DEV) MISC, USE TO TEST BLOOD SUGAR 4 TIMES A DAY, Disp: 1 each, Rfl: 3    TRUE METRIX BLOOD GLUCOSE TEST strip, TEST BLOOD SUGAR 3 TO 4 TIMES DAILY, Disp: 100 each, Rfl: 1    PHARMACIST CHOICE ALCOHOL 70 % PADS, USE BEFORE TESTING SUGAR (4 TIMES) PLUS BEFORE USING INSULIN (5 TIMES A DAY), Disp: 270 each, Rfl: 11    nystatin (MYCOSTATIN) 999903 UNIT/GM cream, Apply topically 2 times daily.  (Patient taking differently: Apply topically as needed Apply topically 2 times daily.), Disp: 15 g, Rfl: 3        Family History         Family History   Problem Relation Age of Onset    High Cholesterol Mother      Depression Mother      Heart Disease Father           stents    High Cholesterol Father      Alcohol Abuse Father      Heart Surgery Father           CABG    Heart Attack Father      Lung Cancer Father           primary, transferred to the bones    Dementia Father      Diabetes Maternal Grandmother      Diabetes Maternal Grandfather      No Known Problems Paternal Grandmother      No Known Problems Paternal Grandfather      No Known Problems Half-Sister      No Known Problems Half-Brother      Thyroid Disease Daughter              Social History               Socioeconomic History    Marital status:        Spouse name: Romero Hanley Number of children: 3    Years of education: Not on file    Highest education level: Not on file   Occupational History    Not on file   Social Needs    Financial resource strain: Not on file    Food insecurity:       Worry: Not on file       Inability: Not on file    Transportation needs:       Medical: Not on file       Non-medical: Not on file   Tobacco Use    Smoking status: Former Smoker       Packs/day: 3.00       Types: Cigarettes       Start date: 10/1987       Last attempt to quit: 2010       Years since quitting: 10.1    Smokeless tobacco: Never Used   Substance and Sexual Activity    Alcohol use: Yes       Frequency: Monthly or less    Drug use: Not Currently    Sexual activity: Yes       Partners: Female   Lifestyle    Physical activity:       Days per week: Not on file       Minutes per session: Not on file    Stress: Not on file   Relationships    Social connections:       Talks on phone: Not on file       Gets together: Not on file       Attends Congregation service: Not on file       Active member of club or organization: Not on file       Attends meetings of clubs or organizations: Not on file       Relationship status: Not on file    Intimate partner violence:       Fear of current or ex partner: Not on file       Emotionally abused: Not on file       Physically abused: Not on file       Forced sexual activity: Not on file   Other Topics Concern    Not on file   Social History Narrative    Not on file            Review of Systems:  Review of Systems   Constitution: Negative for chills. HENT: Negative for congestion. Eyes: Positive for visual disturbance. Respiratory: Negative for shortness of breath.     Hematologic/Lymphatic: Does not bruise/bleed easily. Musculoskeletal: Positive for back pain. Gastrointestinal: Negative for bowel incontinence. Genitourinary: Positive for bladder incontinence. Neurological: Positive for numbness, tingling and weakness. Psychiatric/Behavioral: The patient has insomnia.          Physical Exam:  BP (!) 154/99   Pulse 94   Ht 6' 1\" (1.854 m)   Wt 278 lb (126.1 kg)   BMI 36.68 kg/m²      Physical Exam  Vitals signs reviewed. Constitutional:       General: He is awake. Appearance: He is not ill-appearing or diaphoretic. HENT:      Right Ear: External ear normal.      Left Ear: External ear normal.   Eyes:      General:         Right eye: No discharge. Left eye: No discharge. Conjunctiva/sclera: Conjunctivae normal.   Neck:      Thyroid: No thyromegaly. Trachea: No tracheal deviation. Pulmonary:      Effort: Pulmonary effort is normal. No respiratory distress. Breath sounds: No stridor. Musculoskeletal:      Lumbar back: He exhibits tenderness. He exhibits no edema and no deformity. Skin:     General: Skin is warm and dry. Neurological:      Mental Status: He is alert and oriented to person, place, and time. Gait: Gait normal.      Deep Tendon Reflexes: Reflexes are normal and symmetric. Psychiatric:         Attention and Perception: Attention and perception normal.         Behavior: Behavior normal.            Record/Diagnostics Review:    As above, I did review the imaging        Assessment:  1. Sacroiliitis ([address]    2. Degeneration of lumbar intervertebral disc    3. Lumbosacral spondylosis without myelopathy    4. Ulnar neuropathy of both upper extremities    5. Neuropathy          Treatment Plan:  DISCUSSION: Treatment options discussed with patient and all questions answered to patient's satisfaction.     OARRS Review: Reviewed and acceptable for medications prescribed.   TREATMENT OPTIONS:      Discussed the importance of continued physical therapy and exercise program as well. Axial low back pain the worst of complaints, has failed conservative measures and the pain continues, pain over the bilateral sacroiliac joints with positive provocative test, may benefit from bilateral sacroiliac joint injections for both diagnostic and therapeutic purposes. Consider diagnostic facet blocks or possible epidurals for the low back as well. He has seen neurosurgery and nothing surgical planned for the low back but is working on cardiac clearance for ulnar decompression.   Try compounding cream to the affected area.           Jose Antonio Berg M.D.

## 2020-02-21 NOTE — H&P
H&P Update    Patient's History and Physical from February 6, 2020 was reviewed. Patient examined. There has been no change. Andrés Benito            HPI:      Back Pain   This is a chronic problem. The current episode started more than 1 year ago. The problem occurs constantly. The problem has been gradually worsening since onset. The pain is present in the lumbar spine. The quality of the pain is described as stabbing, shooting, cramping, burning and aching. The pain radiates to the left knee, left thigh, right thigh and right knee. The pain is at a severity of 8/10. The pain is the same all the time. The symptoms are aggravated by twisting, stress, sitting, standing, lying down, position, bending and coughing. Associated symptoms include bladder incontinence, numbness, tingling and weakness. Pertinent negatives include no bowel incontinence. Treatments tried: PT. The treatment provided no relief.      Pain in the low back. MRI lumbar spine with degenerative changes and degenerative disc disease at L5-S1 with disc bulging. He has seen a neurosurgeon nothing surgical.  EMG of the upper extremities with ulnar neuropathy is considering bilateral ulnar decompression however is currently undergoing cardiac work-up for this. MRI the cervical spine with multilevel degenerative changes and disc bulging. Tried Lyrica Cymbalta and gabapentin for neuropathy that is being monitored by his neurologist. Elavil qhs with mild benefit.      Patient denies any new neurological symptoms.  No bowel or bladder incontinence, no weakness, and no falling.     Review of OARRS does not show any aberrant prescription behavior.      Past Medical History        Past Medical History:   Diagnosis Date    Diabetes ([facility] [address]      History of echocardiogram 2016     [facility]; [facility]    History of irregular heartbeat       Was seen by Dr. Cuong Moss in the past.    Hyperlipidemia      Hypertension       PCP Oriana Swanson NP, seen on 1/5/2020  Kidney calculi      Lumbar radiculopathy 6/9/2017    Obstructive sleep apnea syndrome 06/09/2017     No machine    PONV (postoperative nausea and vomiting)       needs scop patch for OR    Prolonged emergence from general anesthesia      Restless legs syndrome 6/9/2017    Severe obesity (BMI 35.0-39. 9) with comorbidity ([facility] [address] 7/31/2018    Wears dentures       Upper & lower            Past Surgical History         Past Surgical History:   Procedure Laterality Date    ANKLE SURGERY Right      COLONOSCOPY   1990     No polyps    GLAUCOMA SURGERY Bilateral 12/2018    SHOULDER SURGERY Right                    Allergies   Allergen Reactions    Codeine Itching    Corticosteroids Swelling       Injection site swelling    Doxycycline         GERD    Ibuprofen Other (See Comments)       GERD    Metformin And Related Diarrhea           Current Medication      Current Outpatient Medications:     Semaglutide (OZEMPIC, 1 MG/DOSE, SC), Inject 1 mg into the skin once a week Every Sunday, Disp: , Rfl:     Sulfacetamide Sodium 9.8 % LOTN, Apply to nose and cheeks twice daily (Patient taking differently: Apply topically as needed Apply to nose and cheeks twice daily), Disp: 113 g, Rfl: 3    atorvastatin (LIPITOR) 20 MG tablet, Take 1 tablet by mouth daily (Patient taking differently: Take 20 mg by mouth nightly ), Disp: 30 tablet, Rfl: 5    omeprazole (PRILOSEC) 20 MG delayed release capsule, Take 1 capsule by mouth every morning (before breakfast), Disp: 90 capsule, Rfl: 3    blood glucose monitor strips, Test 4 times a day & as needed for symptoms of irregular blood glucose., Disp: 120 strip, Rfl: 3    insulin lispro (HUMALOG KWIKPEN) 100 UNIT/ML pen, TIDbefore meals 141-180 6units,181-220 8units,221-260 10units,261-300 12units,301-350 14units,351-400 16 units,>400 18units [id number] daily (Patient taking differently: Inject into the skin 4 times daily (after meals and at bedtime) TIDbefore meals 141-180 bruise/bleed easily. Musculoskeletal: Positive for back pain. Gastrointestinal: Negative for bowel incontinence. Genitourinary: Positive for bladder incontinence. Neurological: Positive for numbness, tingling and weakness. Psychiatric/Behavioral: The patient has insomnia.          Physical Exam:  BP (!) 154/99   Pulse 94   Ht 6' 1\" (1.854 m)   Wt 278 lb (126.1 kg)   BMI 36.68 kg/m²      Physical Exam  Vitals signs reviewed. Constitutional:       General: He is awake. Appearance: He is not ill-appearing or diaphoretic. HENT:      Right Ear: External ear normal.      Left Ear: External ear normal.   Eyes:      General:         Right eye: No discharge. Left eye: No discharge. Conjunctiva/sclera: Conjunctivae normal.   Neck:      Thyroid: No thyromegaly. Trachea: No tracheal deviation. Pulmonary:      Effort: Pulmonary effort is normal. No respiratory distress. Breath sounds: No stridor. Musculoskeletal:      Lumbar back: He exhibits tenderness. He exhibits no edema and no deformity. Skin:     General: Skin is warm and dry. Neurological:      Mental Status: He is alert and oriented to person, place, and time. Gait: Gait normal.      Deep Tendon Reflexes: Reflexes are normal and symmetric. Psychiatric:         Attention and Perception: Attention and perception normal.         Behavior: Behavior normal.            Record/Diagnostics Review:    As above, I did review the imaging        Assessment:  1. Sacroiliitis ([address]    2. Degeneration of lumbar intervertebral disc    3. Lumbosacral spondylosis without myelopathy    4. Ulnar neuropathy of both upper extremities    5. Neuropathy          Treatment Plan:  DISCUSSION: Treatment options discussed with patient and all questions answered to patient's satisfaction.     OARRS Review: Reviewed and acceptable for medications prescribed.   TREATMENT OPTIONS:      Discussed the importance of continued physical therapy and

## 2020-02-21 NOTE — ANESTHESIA POSTPROCEDURE EVALUATION
Department of Anesthesiology  Postprocedure Note    Patient: Daniele Schwarz  MRN: [id number]  YOB: 1970  Date of evaluation: 2/21/2020  Time:  12:09 PM     Procedure Summary     Date:  02/21/20 Room / Location:  [address] [place] [address] 02 / [address] [facility]    Anesthesia Start:  6204 Anesthesia Stop:  1140    Procedure:  SACROILIAC JOINT INJECTION (Bilateral ) Diagnosis:  (SACROILITIS)    Surgeon:  Chirag Parker MD Responsible Provider:  Dawit Zheng MD    Anesthesia Type:  MAC ASA Status:  3          Anesthesia Type: MAC    Tess Phase I:      Tess Phase II: Tess Score: 10    Last vitals: Reviewed and per EMR flowsheets.        Anesthesia Post Evaluation    Patient location during evaluation: PACU  Patient participation: complete - patient participated  Level of consciousness: awake and alert  Pain score: 3  Airway patency: patent  Nausea & Vomiting: no nausea and no vomiting  Complications: no  Cardiovascular status: blood pressure returned to baseline and hemodynamically stable  Respiratory status: acceptable  Hydration status: euvolemic

## 2020-02-21 NOTE — ANESTHESIA PRE PROCEDURE
10/17/19   BRENNON Sanchez CNP   Insulin Pen Needle (COMFORT EZ PEN NEEDLES) 32G X 4 MM MISC Use 5 times daily. Change needle each time with Victoza, Basaglar, and Humalog 10/15/19   BRENNON Meyers CNP   ammonium lactate (AMLACTIN) 12 % cream APPLY TOPICALLY AS NEEDED. Patient taking differently: Apply topically as needed Apply topically as needed. 10/4/19   Juve Somers DPM   amitriptyline (ELAVIL) 25 MG tablet TAKE 2 TAB NIGHTLY  Patient taking differently: Take 25 mg by mouth nightly TAKE 2 TAB NIGHTLY 9/5/19 8/6/20  Latisha Acosta MD   Lancet Devices (SIMPLE DIAGNOSTICS LANCING DEV) MISC USE TO TEST BLOOD SUGAR 4 TIMES A DAY 3/14/19   BRENNON Sanchez CNP   TRUE METRIX BLOOD GLUCOSE TEST strip TEST BLOOD SUGAR 3 TO 4 TIMES DAILY 3/13/19   BRENNON Meyers CNP   PHARMACIST CHOICE ALCOHOL 70 % PADS USE BEFORE TESTING SUGAR (4 TIMES) PLUS BEFORE USING INSULIN (5 TIMES A DAY) 3/13/19   BRENNON Sanchez CNP       Current medications:    No current facility-administered medications for this encounter. Allergies: Allergies   Allergen Reactions    Codeine Itching    Corticosteroids Swelling     Injection site swelling    Doxycycline      GERD    Ibuprofen Other (See Comments)     GERD    Metformin And Related Diarrhea       Problem List:    Patient Active Problem List   Diagnosis Code    Diabetes mellitus ([facility] [address] E11.9    Hyperlipidemia E78.5    Hypertension I10    Lumbar radiculopathy M54.16    Obstructive sleep apnea syndrome G47.33    Restless legs syndrome G25.81    Severe obesity (BMI 35.0-39. 9) with comorbidity (HCC) E66.01    Numbness and tingling in both hands R20.0, R20.2       Past Medical History:        Diagnosis Date    Diabetes ([facility] [address]     History of echocardiogram 2016    [facility]; [facility]    History of irregular heartbeat     Was seen by Dr. Aime Posada in the past.    Hyperlipidemia     Hypertension     PCP Tally Friends NP, seen on 1/5/2020    Kidney calculi  Lumbar radiculopathy 6/9/2017    Obstructive sleep apnea syndrome 06/09/2017    No machine    PONV (postoperative nausea and vomiting)     needs scop patch for OR    Prolonged emergence from general anesthesia     Restless legs syndrome 6/9/2017    Severe obesity (BMI 35.0-39. 9) with comorbidity ([address] 7/31/2018    Wears dentures     Upper & lower       Past Surgical History:        Procedure Laterality Date    ANKLE SURGERY Right     COLONOSCOPY  1990    No polyps    GLAUCOMA SURGERY Bilateral 12/2018    SHOULDER SURGERY Right        Social History:    Social History     Tobacco Use    Smoking status: Former Smoker     Packs/day: 3.00     Types: Cigarettes     Start date: 10/1987     Last attempt to quit: 2010     Years since quitting: 10.1    Smokeless tobacco: Never Used   Substance Use Topics    Alcohol use: Yes     Frequency: Monthly or less                                Counseling given: Not Answered      Vital Signs (Current): There were no vitals filed for this visit.                                            BP Readings from Last 3 Encounters:   02/06/20 (!) 154/99   01/23/20 118/82   01/16/20 (!) 131/92       NPO Status:                                                                                 BMI:   Wt Readings from Last 3 Encounters:   02/06/20 278 lb (126.1 kg)   02/04/20 274 lb (124.3 kg)   01/23/20 274 lb (124.3 kg)     There is no height or weight on file to calculate BMI.    CBC:   Lab Results   Component Value Date    WBC 8.0 02/10/2020    RBC 5.65 02/10/2020    HGB 15.8 02/10/2020    HCT 48.8 02/10/2020    MCV 86.4 02/10/2020    RDW 12.6 02/10/2020     02/10/2020       CMP:   Lab Results   Component Value Date     02/10/2020    K 4.4 02/10/2020     02/10/2020    CO2 22 02/10/2020    BUN 14 02/10/2020    CREATININE 0.90 02/10/2020    GFRAA >60 02/10/2020    LABGLOM >60 02/10/2020    GLUCOSE 93 02/10/2020    CALCIUM 9.8 02/10/2020    BILITOT 0.5 11/19/2019 ALKPHOS 113 11/19/2019    AST 26 11/19/2019    ALT 25 11/19/2019       POC Tests: No results for input(s): POCGLU, POCNA, POCK, POCCL, POCBUN, POCHEMO, POCHCT in the last 72 hours. Coags: No results found for: PROTIME, INR, APTT    HCG (If Applicable): No results found for: PREGTESTUR, PREGSERUM, HCG, HCGQUANT     ABGs: No results found for: PHART, PO2ART, OCV2NBN, JLX7GWO, BEART, F1RKPLBS     Type & Screen (If Applicable):  No results found for: LABABO, [address]    Anesthesia Evaluation  Patient summary reviewed and Nursing notes reviewed  Airway: Mallampati: II  TM distance: >3 FB   Neck ROM: full  Mouth opening: > = 3 FB Dental:    (+) upper dentures and lower dentures      Pulmonary: breath sounds clear to auscultation  (+) sleep apnea: on noncompliant,                            ROS comment: H/o tobacco abuse quit 2010   Cardiovascular:  Exercise tolerance: good (>4 METS),   (+) hypertension: moderate,       ECG reviewed  Rhythm: regular  Rate: normal           Beta Blocker:  Not on Beta Blocker      ROS comment: New onset LBBB negative cardiac cath     Neuro/Psych:   (+) neuromuscular disease:,              ROS comment: Lumbar neuropathy  Restless leg syndrome GI/Hepatic/Renal:   (+) GERD: well controlled,           Endo/Other:    (+) DiabetesType II DM, well controlled, using insulin, : arthritis: OA., .                 Abdominal:           Vascular: negative vascular ROS. Anesthesia Plan      MAC     ASA 3       Induction: intravenous. Anesthetic plan and risks discussed with patient. Plan discussed with CRNA.     Attending anesthesiologist reviewed and agrees with Effie Wahl MD   2/21/2020

## 2020-03-05 ENCOUNTER — OFFICE VISIT (OUTPATIENT)
Dept: PAIN MANAGEMENT | Age: 50
End: 2020-03-05
Payer: MEDICARE

## 2020-03-05 VITALS
DIASTOLIC BLOOD PRESSURE: 71 MMHG | HEIGHT: 73 IN | BODY MASS INDEX: 37.11 KG/M2 | SYSTOLIC BLOOD PRESSURE: 122 MMHG | WEIGHT: 280 LBS | HEART RATE: 90 BPM

## 2020-03-05 PROCEDURE — 99214 OFFICE O/P EST MOD 30 MIN: CPT | Performed by: PAIN MEDICINE

## 2020-03-05 ASSESSMENT — ENCOUNTER SYMPTOMS
BACK PAIN: 1
HEMOPTYSIS: 0

## 2020-03-05 NOTE — PROGRESS NOTES
HPI:     Back Pain   This is a chronic problem. The current episode started more than 1 year ago. The problem occurs constantly. The problem has been gradually worsening since onset. The pain is present in the lumbar spine. The quality of the pain is described as stabbing, shooting, cramping, burning and aching. The pain radiates to the left knee, left thigh, right thigh and right foot. The pain is at a severity of 8/10. The pain is the same all the time. The symptoms are aggravated by twisting, stress, sitting, standing, lying down, position, bending and coughing. Associated symptoms include bladder incontinence, numbness, tingling and weakness. Pertinent negatives include no bowel incontinence. Treatments tried: PT. The treatment provided no relief. Chronic low back pain. MRI with degenerative changes worse at L5-S1. Recent SI joint injection without benefit. Does have neuropathy. Uses compounding cream with some benefit. He is seeing neurosurgeon and has an ulnar decompression pending. States he has been cleared by cardiology. Patient denies any new neurological symptoms. Nobowel or bladder incontinence, no weakness, and no falling. Review of OARRS does not show any aberrant prescription behavior. Past Medical History:   Diagnosis Date    Bundle branch block, left     Diabetes ([address]     History of echocardiogram 2016    [facility]; [facility]    History of irregular heartbeat     Was seen by Dr. Celestino Brown in the past.    Hyperlipidemia     Hypertension     PCP Cat Garcia NP, seen on 1/5/2020    Kidney calculi     Lumbar radiculopathy 6/9/2017    Obstructive sleep apnea syndrome 06/09/2017    No machine    PONV (postoperative nausea and vomiting)     needs scop patch for OR    Prolonged emergence from general anesthesia     Restless legs syndrome 6/9/2017    Severe obesity (BMI 35.0-39. 9) with comorbidity ([address] 7/31/2018    Wears dentures     Upper & lower       Past Surgical History: Procedure Laterality Date    ANKLE SURGERY Right     CARDIAC CATHETERIZATION      COLONOSCOPY  1990    No polyps    GLAUCOMA SURGERY Bilateral 12/2018    HC INJECTION PROCEDURE FOR SACROILIAC JOINT Bilateral 2/21/2020    SACROILIAC JOINT INJECTION performed by Ava Mendez MD at [address] Bilateral 02/21/2020    Bilateral SI Joint    SHOULDER SURGERY Right        Allergies   Allergen Reactions    Codeine Itching    Corticosteroids Swelling     Injection site swelling    Doxycycline      GERD    Ibuprofen Other (See Comments)     GERD    Metformin And Related Diarrhea         Current Outpatient Medications:     aspirin 81 MG tablet, Take 81 mg by mouth daily, Disp: , Rfl:     lisinopril (PRINIVIL;ZESTRIL) 5 MG tablet, Take 5 mg by mouth daily, Disp: , Rfl:     metoprolol succinate (TOPROL XL) 25 MG extended release tablet, Take 25 mg by mouth daily, Disp: , Rfl:     nystatin (MYCOSTATIN) 419904 UNIT/GM cream, Apply topically 2 times daily. , Disp: 15 g, Rfl: 3    Semaglutide (OZEMPIC, 1 MG/DOSE, SC), Inject 1 mg into the skin once a week Every Sunday, Disp: , Rfl:     Sulfacetamide Sodium 9.8 % LOTN, Apply to nose and cheeks twice daily (Patient taking differently: Apply topically as needed Apply to nose and cheeks twice daily), Disp: 113 g, Rfl: 3    atorvastatin (LIPITOR) 20 MG tablet, Take 1 tablet by mouth daily (Patient taking differently: Take 20 mg by mouth nightly ), Disp: 30 tablet, Rfl: 5    omeprazole (PRILOSEC) 20 MG delayed release capsule, Take 1 capsule by mouth every morning (before breakfast), Disp: 90 capsule, Rfl: 3    blood glucose monitor strips, Test 4 times a day & as needed for symptoms of irregular blood glucose., Disp: 120 strip, Rfl: 3    insulin lispro (HUMALOG KWIKPEN) 100 UNIT/ML pen, TIDbefore meals 141-180 6units,181-220 8units,221-260 10units,261-300 12units,301-350 14units,351-400 16 units,>400 18units [id number] daily (Patient taking differently: Inject into the skin 4 times daily (after meals and at bedtime) TIDbefore meals 141-180 6units,181-220 8units,221-260 10units,261-300 12units,301-350 14units,351-400 16 units,>400 18units [id number] daily), Disp: 90 mL, Rfl: 0    insulin glargine (BASAGLAR KWIKPEN) 100 UNIT/ML injection pen, Inject 26 Units into the skin nightly (Patient taking differently: Inject into the skin 2 times daily 14 units in AM 14 Units bed time), Disp: 60 mL, Rfl: 0    Insulin Pen Needle (COMFORT EZ PEN NEEDLES) 32G X 4 MM MISC, Use 5 times daily. Change needle each time with Victoza, Basaglar, and Humalog, Disp: 100 each, Rfl: 11    ammonium lactate (AMLACTIN) 12 % cream, APPLY TOPICALLY AS NEEDED.  (Patient taking differently: Apply topically as needed Apply topically as needed.), Disp: 280 g, Rfl: 2    amitriptyline (ELAVIL) 25 MG tablet, TAKE 2 TAB NIGHTLY (Patient taking differently: Take 25 mg by mouth nightly TAKE 2 TAB NIGHTLY), Disp: 60 tablet, Rfl: 5    Lancet Devices (SIMPLE DIAGNOSTICS LANCING DEV) MISC, USE TO TEST BLOOD SUGAR 4 TIMES A DAY, Disp: 1 each, Rfl: 3    TRUE METRIX BLOOD GLUCOSE TEST strip, TEST BLOOD SUGAR 3 TO 4 TIMES DAILY, Disp: 100 each, Rfl: 1    PHARMACIST CHOICE ALCOHOL 70 % PADS, USE BEFORE TESTING SUGAR (4 TIMES) PLUS BEFORE USING INSULIN (5 TIMES A DAY), Disp: 270 each, Rfl: 11    Family History   Problem Relation Age of Onset    High Cholesterol Mother     Depression Mother     Heart Disease Father         stents    High Cholesterol Father     Alcohol Abuse Father     Heart Surgery Father         CABG    Heart Attack Father     Lung Cancer Father         primary, transferred to the bones    Dementia Father     Diabetes Maternal Grandmother     Diabetes Maternal Grandfather     No Known Problems Paternal Grandmother     No Known Problems Paternal Grandfather     No Known Problems Half-Sister     No Known Problems Half-Brother     Thyroid Disease Daughter

## 2020-03-20 RX ORDER — ISOPROPYL ALCOHOL 0.75 G/1
SWAB TOPICAL
Qty: 100 EACH | Refills: 11 | Status: SHIPPED | OUTPATIENT
Start: 2020-03-20 | End: 2020-03-31

## 2020-03-20 RX ORDER — GLUCOSAM/CHON-MSM1/C/MANG/BOSW 500-416.6
TABLET ORAL
Qty: 100 EACH | Refills: 11 | Status: SHIPPED | OUTPATIENT
Start: 2020-03-20 | End: 2021-01-18

## 2020-03-20 NOTE — TELEPHONE ENCOUNTER
Hypertension     Lumbar radiculopathy     Obstructive sleep apnea syndrome     Restless legs syndrome     Severe obesity (BMI 35.0-39. 9) with comorbidity (HCC)     Numbness and tingling in both hands

## 2020-03-31 ENCOUNTER — TELEPHONE (OUTPATIENT)
Dept: NEUROSURGERY | Age: 50
End: 2020-03-31

## 2020-03-31 RX ORDER — ISOPROPYL ALCOHOL 0.75 G/1
SWAB TOPICAL
Qty: 365 EACH | Refills: 2 | Status: SHIPPED | OUTPATIENT
Start: 2020-03-31

## 2020-03-31 NOTE — TELEPHONE ENCOUNTER
We do not prescribe pain medications unless treating post op pain. Will need to discuss with pain specialist or PCP.

## 2020-03-31 NOTE — TELEPHONE ENCOUNTER
Pt called stating he was scheduled to have his PM injections on 3/20 but Dr Eyad Costa ofc cancelled injections until further notice. Pt states that he informed Dr Eyad Costa office abt his pain and Dr Eyad Costa ofc told him to go see a psychologist and take a UDS. Pt does not take anything for pain. Pt states he was out with a friend. While standing, pt states he felt a \"pop\" in lumbar region and legs felt heavy and felt like they were going to give out. Pt states that when they were leaving, it popped again. Pain radiates down BLE but left is worse.     Next Visit Date:  Visit date not found    Last Visit  Date  1.13.2020-Ahammad    Onset: 5 day(s) ago  Timing: constant, intermittent  Severity: Severe (8-9/10 pain scale)    Progression: increasing    Associated Symptoms: Spasms    Any allergy  To medications - Codeine, Corticosteroids, Doxycycline, Ibuprofen, and Metformin    [address] (Bozena Chavez)    Is it ok to leave a message if we call back yes

## 2020-04-01 ENCOUNTER — TELEMEDICINE (OUTPATIENT)
Dept: NEUROSURGERY | Age: 50
End: 2020-04-01
Payer: MEDICARE

## 2020-04-01 VITALS — HEIGHT: 73 IN | WEIGHT: 280 LBS | BODY MASS INDEX: 37.11 KG/M2

## 2020-04-01 PROCEDURE — G8417 CALC BMI ABV UP PARAM F/U: HCPCS | Performed by: NEUROLOGICAL SURGERY

## 2020-04-01 PROCEDURE — G8427 DOCREV CUR MEDS BY ELIG CLIN: HCPCS | Performed by: NEUROLOGICAL SURGERY

## 2020-04-01 PROCEDURE — 3017F COLORECTAL CA SCREEN DOC REV: CPT | Performed by: NEUROLOGICAL SURGERY

## 2020-04-01 PROCEDURE — 1036F TOBACCO NON-USER: CPT | Performed by: NEUROLOGICAL SURGERY

## 2020-04-01 PROCEDURE — 99213 OFFICE O/P EST LOW 20 MIN: CPT | Performed by: NEUROLOGICAL SURGERY

## 2020-04-01 RX ORDER — ISOSORBIDE MONONITRATE 30 MG/1
1 TABLET, EXTENDED RELEASE ORAL DAILY
COMMUNITY
Start: 2020-03-17

## 2020-04-01 RX ORDER — BENZOYL PEROXIDE 50 MG/ML
LIQUID TOPICAL DAILY
COMMUNITY
Start: 2020-03-17 | End: 2020-05-26

## 2020-04-01 RX ORDER — IBUPROFEN 600 MG/1
TABLET ORAL
COMMUNITY
Start: 2020-02-12

## 2020-04-01 RX ORDER — DOXYCYCLINE HYCLATE 100 MG/1
1 CAPSULE ORAL DAILY
COMMUNITY
Start: 2020-03-17 | End: 2020-05-04 | Stop reason: SINTOL

## 2020-04-01 RX ORDER — ERGOCALCIFEROL 1.25 MG/1
1 CAPSULE ORAL WEEKLY
COMMUNITY
Start: 2020-03-14

## 2020-04-01 NOTE — PROGRESS NOTES
topically 2 times daily. BRENNON Reece CNP   Semaglutide (OZEMPIC, 1 MG/DOSE, SC) Inject 1 mg into the skin once a week Every Sunday  Historical Provider, MD   Sulfacetamide Sodium 9.8 % LOTN Apply to nose and cheeks twice daily  Patient taking differently: Apply topically as needed Apply to nose and cheeks twice daily  Netta Blanco MD   atorvastatin (LIPITOR) 20 MG tablet Take 1 tablet by mouth daily  Patient taking differently: Take 20 mg by mouth nightly   BRENNON Reece CNP   omeprazole (PRILOSEC) 20 MG delayed release capsule Take 1 capsule by mouth every morning (before breakfast)  BRENNON Reece CNP   blood glucose monitor strips Test 4 times a day & as needed for symptoms of irregular blood glucose. BRENNON Reece CNP   insulin lispro (HUMALOG KWIKPEN) 100 UNIT/ML pen TIDbefore meals 141-180 6units,181-220 8units,221-260 10units,261-300 12units,301-350 14units,351-400 16 units,>400 18units [id number] daily  Patient taking differently: Inject into the skin 4 times daily (after meals and at bedtime) TIDbefore meals 141-180 6units,181-220 8units,221-260 10units,261-300 12units,301-350 14units,351-400 16 units,>400 18units [id number] daily  BRENNON Sanchez CNP   insulin glargine (BASAGLAR KWIKPEN) 100 UNIT/ML injection pen Inject 26 Units into the skin nightly  Patient taking differently: Inject into the skin 2 times daily 14 units in AM 14 Units bed time  BRENNON Reece CNP   Insulin Pen Needle (COMFORT EZ PEN NEEDLES) 32G X 4 MM MISC Use 5 times daily. Change needle each time with Victoza, Basaglar, and Humalog  BRENNON Reece CNP   ammonium lactate (AMLACTIN) 12 % cream APPLY TOPICALLY AS NEEDED. Patient taking differently: Apply topically as needed Apply topically as needed.   Tremaine Eid DPM   amitriptyline (ELAVIL) 25 MG tablet TAKE 2 TAB NIGHTLY  Patient taking differently: Take 25 mg by mouth nightly TAKE 2 TAB NIGHTLY  Merlin Stephenson MD   Lancet Devices (SIMPLE DIAGNOSTICS LANCING DEV) MISC USE TO TEST BLOOD SUGAR 4 TIMES A DAY  BRENNON Sanchez CNP   TRUE METRIX BLOOD GLUCOSE TEST strip TEST BLOOD SUGAR 3 TO 4 TIMES DAILY  BRENNON Sanchez CNP   PHARMACIST CHOICE ALCOHOL 70 % PADS USE BEFORE TESTING SUGAR (4 TIMES) PLUS BEFORE USING INSULIN (5 TIMES A DAY)  BRENNON Sanchez CNP       Social History     Tobacco Use    Smoking status: Former Smoker     Packs/day: 3.00     Types: Cigarettes     Start date: 10/1987     Last attempt to quit: 2010     Years since quitting: 10.2    Smokeless tobacco: Never Used   Substance Use Topics    Alcohol use: Yes     Frequency: Monthly or less    Drug use: Not Currently        Allergies   Allergen Reactions    Codeine Itching    Corticosteroids Swelling     Injection site swelling    Doxycycline      GERD    Ibuprofen Other (See Comments)     GERD    Metformin And Related Diarrhea   ,   Past Medical History:   Diagnosis Date    Bundle branch block, left     Diabetes ([facility] [address]     History of echocardiogram 2016    [facility]; [facility]    History of irregular heartbeat     Was seen by Dr. Mart Jaimes in the past.    Hyperlipidemia     Hypertension     PCP Guerita Mccarthy NP, seen on 1/5/2020    Kidney calculi     Lumbar radiculopathy 6/9/2017    Obstructive sleep apnea syndrome 06/09/2017    No machine    PONV (postoperative nausea and vomiting)     needs scop patch for OR    Prolonged emergence from general anesthesia     Restless legs syndrome 6/9/2017    Severe obesity (BMI 35.0-39. 9) with comorbidity ([address] 7/31/2018    Wears dentures     Upper & lower   ,   Past Surgical History:   Procedure Laterality Date    ANKLE SURGERY Right     CARDIAC CATHETERIZATION      COLONOSCOPY  1990    No polyps    GLAUCOMA SURGERY Bilateral 12/2018    HC INJECTION PROCEDURE FOR SACROILIAC JOINT Bilateral 2/21/2020    SACROILIAC JOINT INJECTION performed by Kuldeep Barcenas MD at [address] Bilateral

## 2020-04-02 ENCOUNTER — TELEPHONE (OUTPATIENT)
Dept: NEUROSURGERY | Age: 50
End: 2020-04-02

## 2020-04-02 RX ORDER — PREDNISONE 20 MG/1
TABLET ORAL
Qty: 30 TABLET | Refills: 0 | Status: SHIPPED | OUTPATIENT
Start: 2020-04-02 | End: 2020-04-17

## 2020-04-09 ENCOUNTER — TELEPHONE (OUTPATIENT)
Dept: FAMILY MEDICINE CLINIC | Age: 50
End: 2020-04-09

## 2020-04-09 NOTE — TELEPHONE ENCOUNTER
Patient called and said he has been trying to get hold of his doctor at [address] [place] [address]  for his glaucoma drops for his eyes. They are closed because of the COVID-19 and I tried to call them abelardo no answer.  He does not know what the drops are called and I couldn't find anything in his chart

## 2020-04-21 ENCOUNTER — TELEPHONE (OUTPATIENT)
Dept: PAIN MANAGEMENT | Age: 50
End: 2020-04-21

## 2020-04-28 ENCOUNTER — OFFICE VISIT (OUTPATIENT)
Dept: PODIATRY | Age: 50
End: 2020-04-28
Payer: MEDICARE

## 2020-04-28 VITALS — WEIGHT: 280 LBS | RESPIRATION RATE: 16 BRPM | HEIGHT: 73 IN | TEMPERATURE: 96.8 F | BODY MASS INDEX: 37.11 KG/M2

## 2020-04-28 PROCEDURE — 3017F COLORECTAL CA SCREEN DOC REV: CPT | Performed by: PODIATRIST

## 2020-04-28 PROCEDURE — 11721 DEBRIDE NAIL 6 OR MORE: CPT | Performed by: PODIATRIST

## 2020-04-28 PROCEDURE — 2022F DILAT RTA XM EVC RTNOPTHY: CPT | Performed by: PODIATRIST

## 2020-04-28 PROCEDURE — 99213 OFFICE O/P EST LOW 20 MIN: CPT | Performed by: PODIATRIST

## 2020-04-28 PROCEDURE — 1036F TOBACCO NON-USER: CPT | Performed by: PODIATRIST

## 2020-04-28 PROCEDURE — G8417 CALC BMI ABV UP PARAM F/U: HCPCS | Performed by: PODIATRIST

## 2020-04-28 PROCEDURE — G8427 DOCREV CUR MEDS BY ELIG CLIN: HCPCS | Performed by: PODIATRIST

## 2020-04-28 PROCEDURE — 3044F HG A1C LEVEL LT 7.0%: CPT | Performed by: PODIATRIST

## 2020-04-28 RX ORDER — NYSTATIN 100000 U/G
CREAM TOPICAL
Qty: 1 TUBE | Refills: 2 | Status: SHIPPED
Start: 2020-04-28 | End: 2020-05-26 | Stop reason: SDUPTHER

## 2020-05-01 NOTE — PROGRESS NOTES
NEUROLOGY FOLLOW-UP  Patient Name:  Virginia Castillo  :   1970  Clinic Visit Date: 2020        REVIEW OF SYSTEMS  Constitutional Weight changes: absent, Fevers : absent, Fatigue: absent; Any recent hospitalizations:  absent.    HEENT Ears: normal, Eyes: glasses, Visual disturbance: absent   Reespiratory Shortness of breath: absent, Cough: absent   Cardivascular Chest pain: absent, Leg swelling :present   GI Constipation: absent, Diarrhea: absent, Swallowing change: absent    Urinary frequency: absent, Urinary urgency: absent, Urinary incontinence: absent   Musculoskeletal Neck pain: present, Back pain: present, Stiffness: present, Muscle pain: present, Joint pain: present   Dermatological Hair loss: absent, Skin changes: absent   Neurological Memory loss: absent, Confusion: absent, Seizures: absent; Trouble walking or imbalance: present, Dizziness: absent, Weakness: present, Numbness present Tremor: present, Spasm: present, Speech difficulty: absent, Headache: absent, Light sensitivity: absent   Psychiatric Anxiety: absent, Depression  present, Suicidal ideations absent   Hematologic Abnormal bleeding: absent, Anemia: absent, Clotting disorder: absent, Lymph gland changes: absent

## 2020-05-04 ENCOUNTER — TELEMEDICINE (OUTPATIENT)
Dept: DERMATOLOGY | Age: 50
End: 2020-05-04
Payer: MEDICARE

## 2020-05-04 ENCOUNTER — TELEMEDICINE (OUTPATIENT)
Dept: NEUROLOGY | Age: 50
End: 2020-05-04
Payer: MEDICARE

## 2020-05-04 ENCOUNTER — TELEPHONE (OUTPATIENT)
Dept: DERMATOLOGY | Age: 50
End: 2020-05-04

## 2020-05-04 ENCOUNTER — TELEPHONE (OUTPATIENT)
Dept: NEUROLOGY | Age: 50
End: 2020-05-04

## 2020-05-04 PROBLEM — M62.838 MUSCLE SPASMS OF BOTH LOWER EXTREMITIES: Status: ACTIVE | Noted: 2020-05-04

## 2020-05-04 PROBLEM — G57.93 NEUROPATHIC PAIN OF BOTH LEGS: Status: ACTIVE | Noted: 2020-05-04

## 2020-05-04 PROBLEM — R26.9 GAIT DIFFICULTY: Status: ACTIVE | Noted: 2020-05-04

## 2020-05-04 PROBLEM — E11.42 DIABETIC POLYNEUROPATHY ASSOCIATED WITH TYPE 2 DIABETES MELLITUS (HCC): Status: ACTIVE | Noted: 2020-05-04

## 2020-05-04 PROCEDURE — G8428 CUR MEDS NOT DOCUMENT: HCPCS | Performed by: DERMATOLOGY

## 2020-05-04 PROCEDURE — 99214 OFFICE O/P EST MOD 30 MIN: CPT | Performed by: DERMATOLOGY

## 2020-05-04 PROCEDURE — 3017F COLORECTAL CA SCREEN DOC REV: CPT | Performed by: DERMATOLOGY

## 2020-05-04 PROCEDURE — 99214 OFFICE O/P EST MOD 30 MIN: CPT | Performed by: PSYCHIATRY & NEUROLOGY

## 2020-05-04 PROCEDURE — 3017F COLORECTAL CA SCREEN DOC REV: CPT | Performed by: PSYCHIATRY & NEUROLOGY

## 2020-05-04 PROCEDURE — 2022F DILAT RTA XM EVC RTNOPTHY: CPT | Performed by: PSYCHIATRY & NEUROLOGY

## 2020-05-04 PROCEDURE — G8427 DOCREV CUR MEDS BY ELIG CLIN: HCPCS | Performed by: PSYCHIATRY & NEUROLOGY

## 2020-05-04 PROCEDURE — 3044F HG A1C LEVEL LT 7.0%: CPT | Performed by: PSYCHIATRY & NEUROLOGY

## 2020-05-04 RX ORDER — AMITRIPTYLINE HYDROCHLORIDE 25 MG/1
TABLET, FILM COATED ORAL
Qty: 60 TABLET | Refills: 3 | Status: SHIPPED | OUTPATIENT
Start: 2020-05-04 | End: 2020-08-04 | Stop reason: SDUPTHER

## 2020-05-04 RX ORDER — KETOCONAZOLE 20 MG/G
CREAM TOPICAL
Qty: 60 G | Refills: 2 | Status: SHIPPED | OUTPATIENT
Start: 2020-05-04 | End: 2020-07-30

## 2020-05-04 RX ORDER — TIZANIDINE 4 MG/1
TABLET ORAL
Qty: 30 TABLET | Refills: 3 | Status: SHIPPED | OUTPATIENT
Start: 2020-05-04 | End: 2020-08-04 | Stop reason: SDUPTHER

## 2020-05-04 RX ORDER — ISOTRETINOIN 40 MG/1
40 CAPSULE ORAL 2 TIMES DAILY
Qty: 60 CAPSULE | Refills: 0 | Status: SHIPPED | OUTPATIENT
Start: 2020-05-04 | End: 2020-06-03 | Stop reason: ALTCHOICE

## 2020-05-04 RX ORDER — AMITRIPTYLINE HYDROCHLORIDE 25 MG/1
25 TABLET, FILM COATED ORAL NIGHTLY
Qty: 60 TABLET | Refills: 3 | Status: SHIPPED
Start: 2020-05-04 | End: 2020-05-04 | Stop reason: CLARIF

## 2020-05-04 NOTE — PROGRESS NOTES
He is taking 50 mg every night. He was borderline diabetic for > 3 years. He is on multiple diabetic medications including insulin, Victoza, etc.  His blood sugars had been under control but his symptoms had been getting worse. He has had NCS/EMG testing of lower extremities and he was told to have neuropathy. He was on gabapentin 300 mg 3 times daily and it has caused increased sleepiness and he stopped taking it. He also tried Lyrica and Cymbalta with no help. He has been having stinging sensation along with tingling and numbness in lower legs and feet. He also has been having painful sensations in both hands and those are described as clue-like sensation in medial aspect of both forearms and last 2 digits. He has been having trouble walking with increased pain upon walking. He denies radiating pain and paresthesias across the lower back. Denies bladder and bowel incontinence. He has been having extreme difficulty walking with unsteady gait with progressive pain in bilateral lower extremities. Patient states he is getting a compound cream from [facility] (gabapentin 10%, ketoprofen 10%, lidocaine 2%, prilocaine 2% solution) to be applied 2-5 drops to area of pain up to 4 times a day prn    Review of systems done by staff reviewed and pertinent positives include numbness, weakness, spasms, pain in both legs, unsteady gait, back pain and restless legs as stated above. Current Outpatient Medications on File Prior to Visit   Medication Sig Dispense Refill    nystatin (MYCOSTATIN) 519099 UNIT/GM cream Apply topically 2 times daily. 1 Tube 2    Misc.  Devices MISC 1 PAIR OF DIABETIC SHOES (1 LEFT/ 1 RIGHT)  1-3 PAIRS OF INSERTS (LEFT/ RIGHT) 2 each 0    carbidopa-levodopa (SINEMET)  MG per tablet TAKE 1/2 TABLET BY MOUTH AT 8PM AND 11PM 30 tablet 0    BENZOYL PEROXIDE 5 % external wash daily      vitamin D (ERGOCALCIFEROL) 1.25 MG (35391 UT) CAPS capsule Take 1 capsule by mouth once a week      GLUCAGON EMERGENCY 1 MG injection       isosorbide mononitrate (IMDUR) 30 MG extended release tablet Take 1 tablet by mouth daily      HYDRALAZINE HCL PO Take 50 mg by mouth daily      Alcohol Swabs (B-D SINGLE USE SWABS REGULAR) PADS USE BEFORE TESTING 4 TIMES A DAY PLUS BEFORE USING INSULIN (5 TIMES A DAY) 365 each 2    TRUEplus Lancets 33G MISC USE TO TEST BLOOD SUGAR FOUR TIMES DAILY 100 each 11    aspirin 81 MG tablet Take 81 mg by mouth daily      lisinopril (PRINIVIL;ZESTRIL) 5 MG tablet Take 5 mg by mouth daily      metoprolol succinate (TOPROL XL) 25 MG extended release tablet Take 25 mg by mouth daily      nystatin (MYCOSTATIN) 435430 UNIT/GM cream Apply topically 2 times daily. 15 g 3    Semaglutide (OZEMPIC, 1 MG/DOSE, SC) Inject 1 mg into the skin once a week Every Sunday      Sulfacetamide Sodium 9.8 % LOTN Apply to nose and cheeks twice daily (Patient taking differently: Apply topically as needed Apply to nose and cheeks twice daily) 113 g 3    atorvastatin (LIPITOR) 20 MG tablet Take 1 tablet by mouth daily (Patient taking differently: Take 20 mg by mouth nightly ) 30 tablet 5    omeprazole (PRILOSEC) 20 MG delayed release capsule Take 1 capsule by mouth every morning (before breakfast) 90 capsule 3    blood glucose monitor strips Test 4 times a day & as needed for symptoms of irregular blood glucose.  120 strip 3    insulin lispro (HUMALOG KWIKPEN) 100 UNIT/ML pen TIDbefore meals 141-180 6units,181-220 8units,221-260 10units,261-300 12units,301-350 14units,351-400 16 units,>400 18units [id number] daily (Patient taking differently: Inject into the skin 4 times daily (after meals and at bedtime) TIDbefore meals 141-180 6units,181-220 8units,221-260 10units,261-300 12units,301-350 14units,351-400 16 units,>400 18units [id number] daily) 90 mL 0    insulin glargine (BASAGLAR KWIKPEN) 100 UNIT/ML injection pen Inject 26 Units into the skin nightly (Patient taking differently: neurosurgeon Dr. Rylee Reed. Restless leg syndrome sec to diabetic peripheral polyneuropathy; periodic leg movements in sleep; well controlled on low-dose of Sinemet; to continue same at half tab Sinemet 25/100 bid at 8 pm- 11 pm    Meds tried for neuropathic pain relief:  Failed gabapentin, Lyrica and duloxetine; couldn't tolerate higher doses of Elavil    Follow-up in 6 months or sooner for further questions and concerns. Please note that portions of this note were completed with a voice recognition program.  Although every effort was made to ensure the accuracy of this automated transcription, some errors in transcription may have occurred; occasionally words are mis-transcribed. Thank you for understanding. This is a telehealth visit that was performed with the originating site at Patient Location: Patient Home and Provider Location of [place], [place]. Patient ID verified by me prior to start of this visit. Verbal consent to participate in video visit was obtained. Pursuant to the emergency declaration under the [facility], [facility] waiver authority and the [email or web address] and [employer] Act, this Virtual Visit was conducted, with patient's consent, to reduce the patient's risk of exposure to COVID-19 and provide continuity of care for an established/new patient. Complete and detailed physical examination is not feasible during this virtual video visit and patient is agreeable and understood. Services were provided through a video synchronous discussion virtually to substitute for in-person clinic visit.  I discussed with the patient the nature of our telehealth visits via interactive/real-time audio/video that:  - I would evaluate the patient and recommend diagnostics and treatments based on my assessment  - Our sessions are not being recorded and that personal health information is protected  - Our

## 2020-05-04 NOTE — LETTER
[id number] [facility] Neurology Specialist  [address] [place] [id number]  Phone: [phone]  Fax: [phone]    Mal Bond MD        May 4, 2020     BRENNON Wang CNP  [address] [place] [address] [address]    Patient: Marlene Hastings  MR Number: [id number]  YOB: 1970  Date of Visit: 5/4/2020    Dear Dr. Liudmila Ny: Thank you for the request for consultation for Ani Nick to me for the evaluation of PUNEET MELARA  Below are the relevant portions of my assessment and plan of care. [facility] [facility] Neurology Telehealth Followup Visit    Pt Name: Marlene Hastings  MRN: [id number]  YOB: 1970  Date of evaluation: 5/4/2020  Primary Care Physician: BRENNON Wang CNP  Reason for Evaluation: TELEHEALTH EVALUATION -- Audio/Visual (During XIBZS-65 public health emergency)    Dear BRENNON Chamorro CNP     I saw Mr. Marlene Hastings in virtual visit follow-up today in continuation of neurologic care. As you know he  is a 45 y.o. right handed  male with diabetic peripheral polyneuropathy superimposed with bilateral carpal tunnel syndrome. Today he comes to the visit stating that he is scheduled for right carpal tunnel release and intervention for right ulnar neuropathy on May 13. He also stated that his pins-and-needles sensations in lower extremities did not get any better with amitriptyline. He is presently taking amitriptyline 50 mg nightly and he could not tolerate any dose higher than 50 mg nighlty as it made him \"drunk\". Then he tried Capsaicin cream and could not tolerate it. Afterwards, he visited pain clinic and he was on liquid preparation med, ? Topical solution with some relief.  He also stated that he he was seen by chiropractor and then had MRI lumbar spine and it was abnormal for which he was subsequently referred to neuosurgeon  nystatin (MYCOSTATIN) 189944 UNIT/GM cream Apply topically 2 times daily. 1 Tube 2    Misc. Devices MISC 1 PAIR OF DIABETIC SHOES (1 LEFT/ 1 RIGHT)  1-3 PAIRS OF INSERTS (LEFT/ RIGHT) 2 each 0    carbidopa-levodopa (SINEMET)  MG per tablet TAKE 1/2 TABLET BY MOUTH AT 8PM AND 11PM 30 tablet 0    BENZOYL PEROXIDE 5 % external wash daily      vitamin D (ERGOCALCIFEROL) 1.25 MG (10554 UT) CAPS capsule Take 1 capsule by mouth once a week      GLUCAGON EMERGENCY 1 MG injection       isosorbide mononitrate (IMDUR) 30 MG extended release tablet Take 1 tablet by mouth daily      HYDRALAZINE HCL PO Take 50 mg by mouth daily      Alcohol Swabs (B-D SINGLE USE SWABS REGULAR) PADS USE BEFORE TESTING 4 TIMES A DAY PLUS BEFORE USING INSULIN (5 TIMES A DAY) 365 each 2    TRUEplus Lancets 33G MISC USE TO TEST BLOOD SUGAR FOUR TIMES DAILY 100 each 11    aspirin 81 MG tablet Take 81 mg by mouth daily      lisinopril (PRINIVIL;ZESTRIL) 5 MG tablet Take 5 mg by mouth daily      metoprolol succinate (TOPROL XL) 25 MG extended release tablet Take 25 mg by mouth daily      nystatin (MYCOSTATIN) 984440 UNIT/GM cream Apply topically 2 times daily. 15 g 3    Semaglutide (OZEMPIC, 1 MG/DOSE, SC) Inject 1 mg into the skin once a week Every Sunday      Sulfacetamide Sodium 9.8 % LOTN Apply to nose and cheeks twice daily (Patient taking differently: Apply topically as needed Apply to nose and cheeks twice daily) 113 g 3    atorvastatin (LIPITOR) 20 MG tablet Take 1 tablet by mouth daily (Patient taking differently: Take 20 mg by mouth nightly ) 30 tablet 5    omeprazole (PRILOSEC) 20 MG delayed release capsule Take 1 capsule by mouth every morning (before breakfast) 90 capsule 3    blood glucose monitor strips Test 4 times a day & as needed for symptoms of irregular blood glucose.  120 strip 3    insulin lispro (HUMALOG KWIKPEN) 100 UNIT/ML pen TIDbefore meals 141-180 speech, normal language, no hallucination or delusion: Appropriate affect   CRANIAL NERVES: II     -      PERRLA, Fundi reveal intact venous pulsations; Visual fields intact to confrontation  III,IV,VI -  EOMs full, no afferent defect, no                      KRISTINE, no ptosis  V     -     Normal facial sensation  VII    -     Normal facial symmetry  VIII   -     Intact hearing  IX,X -     Symmetrical palate  XI    -     Symmetrical shoulder shrug  XII   -     Midline tongue, no atrophy    MOTOR FUNCTION:  significant for good strength of grade 5/5 in bilateral proximal and distal muscle groups of both upper and lower extremities with normal bulk, normal tone and no involuntary movements, no tremor   SENSORY FUNCTION:  Impaired light touch, pinprick and temperature in distal lower extremities bilaterally. CEREBELLAR FUNCTION:  Intact fine motor control over upper limbs   REFLEX FUNCTION:  Symmetric, 1+ DTRs at both kneed and diminished ankle jerks bilaterally   STATION and GAIT  Normal station, Wide-based gait; could not do tandem gait; +ve romberg sign       Diagnostic data reviewed with the patient:   NCS/EMG of bilateral upper and lower extremities 5/29/18:    Performed by Dr. Kt Carl:   Abnormal study; electrophysiologic evidence of mild bilateral carpal tunnel syndrome. EMG of Bilateral UE (4/4/19):   Electrodiagnostic Interpretation:   There is electrophysiologic evidence of ulnar neuropathy at right elbow with demyelinating and axonal features. Supporting features include low ulnar snap, prolonged ulnar cmap with slowing of greater than 1011 m/s across the elbow segment compared with the forearm segment.   This study also demonstrated electrophysiologic evidence of bilateral median neuropathy (bilateral CTS)  of mild-moderate severity.              Saw chiropracter  a1c is ok  Back mri done at [facility]     Getting CTS release on may 13 th on rt elbow and rt hand  For left hand once heals participate in video visit was obtained. Pursuant to the emergency declaration under the [facility], [facility] waiver authority and the [employer] and [employer] Act, this Virtual Visit was conducted, with patient's consent, to reduce the patient's risk of exposure to COVID-19 and provide continuity of care for an established/new patient. Complete and detailed physical examination is not feasible during this virtual video visit and patient is agreeable and understood. Services were provided through a video synchronous discussion virtually to substitute for in-person clinic visit. I discussed with the patient the nature of our telehealth visits via interactive/real-time audio/video that:  - I would evaluate the patient and recommend diagnostics and treatments based on my assessment  - Our sessions are not being recorded and that personal health information is protected  - Our team would provide follow up care in person if/when the patient needs it. [address] [facility] Neurology Telehealth Followup Visit    Pt Name: Shanda Paige  MRN: [id number]  YOB: 1970  Date of evaluation: 5/4/2020  Primary Care Physician: BRENNON Hernandez CNP  Reason for Evaluation: TELEHEALTH EVALUATION -- Audio/Visual (During TMLRN-44 public health emergency)    Dear BRENNON Rivera CNP     I saw Mr. Shanda Paige in virtual visit follow-up today in continuation of neurologic care. As you know he  is a 45 y.o. right handed  male with diabetic peripheral polyneuropathy superimposed with bilateral carpal tunnel syndrome. Today he comes to the visit stating that he is scheduled for right carpal tunnel release and intervention for right ulnar neuropathy on May 13. He also stated that his pins-and-needles sensations in lower extremities did not get any better with amitriptyline.   He is presently taking amitriptyline 50 mg nightly and he could not tolerate any dose higher than 50 mg nighlty as it made him \"drunk\". Then he tried Capsaicin cream and could not tolerate it. Afterwards, he visited pain clinic and he was on liquid preparation med, ? Topical solution with some relief. He also stated that he he was seen by chiropractor and then had MRI lumbar spine and it was abnormal for which he was subsequently referred to neuosurgeon for \"pinched nerve\" and he is on schedule for surgical intervention. He has significantly abnormal and painful sensations with numbness and tingling in fingers and pain in right elbow along with stinging sensation and cramps in both calves. He has restless legs with intermittent myoclonic jerks through the night. He also has been having significant gait difficulties with \"occasional right leg giving out\". Sinemet has helped for restless legs in the past.    He has ongoing dysesthesias in bilateral lower extremities with marginal relief with amitriptyline. Increasing the dose of amitriptyline is making him lethargic. He is taking 50 mg every night. He was borderline diabetic for > 3 years. He is on multiple diabetic medications including insulin, Victoza, etc.  His blood sugars had been under control but his symptoms had been getting worse. He has had NCS/EMG testing of lower extremities and he was told to have neuropathy. He was on gabapentin 300 mg 3 times daily and it has caused increased sleepiness and he stopped taking it. He also tried Lyrica and Cymbalta with no help. He has been having stinging sensation along with tingling and numbness in lower legs and feet. He also has been having painful sensations in both hands and those are described as clue-like sensation in medial aspect of both forearms and last 2 digits. He has been having trouble walking with increased pain upon walking.   He denies radiating pain and paresthesias

## 2020-05-04 NOTE — PATIENT INSTRUCTIONS
Isotretinoin for Acne    Isotretinoin is a retinoid medication that is taken by mouth to treat severe nodular acne. Typically, it is used once other acne treatments have not worked, such as oral antibiotics. Usually isotretinoin is taken for 4 to 6 months, although the length of treatment can vary from person to person. While most patients acne improves and may even clear with this medication, in 20% of patients acne can come back. This requires additional acne treatment or even a second cycle of isotretinoin. How should I take isotretinoin? · Isotretinoin dosing is weight-based and should be taken exactly as prescribed. · If you miss a dose, skip that dose. Do not take 2 doses at the same time. · Take with food to help with absorption. · All instructions in the i[employer] program packet (www.[employer]) that was provided must be followed (see below). · You will get a 30 day supply of isotretinoin at a time. It cannot be automatically refilled. Make certain that you have been given enough medication to last 30 days as pharmacists are unable to refill or make changes within a month. · You must return to your dermatologist every month to make sure you are not having any serious side effects from isotretinoin. For female patients, this visit will always include a monthly pregnancy test. Other laboratory studies, including liver function tests, cholesterol and triglycerides, must also be conducted before and during treatment. What should I avoid while taking isotretinoin? · Do not get pregnant from one month prior or 1 month following taking any isotretinoin. · Do not donate blood while take isotretinoin or until 1 months after coming off the medication. · Do not have cosmetic procedures to smooth your skin, including waxing, dermabrasion, or laser procedures, while taking this medication and for at least 6 months after you stop. · Do not share isotretinoin with any other people.  It can seizures. · Skin rash - call your doctor right away if you develop any rashes or blisters on the skin. · Liver damage - call your doctor right away if you experience severe stomach, chest or bowel pain, painful swallowing, diarrhea, blood in your stool, yellowing of your skin or eyes, or dark urine. · Gastrointestinal bleeding. If you experience unusual abdominal pain or red or black/tarry stools, call your doctor immediately. You should also notify your doctor if you or a family member has a history of ulcerative colitis or Crohns disease. · Worsening acne. Mild worsening of acne can occur in the first few weeks of using isotreinoin. If your acne is getting significantly worse, call your doctor. This may require temporarily stopping the isotretinoin and possibly adding other medications. Contributing SPD members:  Nelly Kohler M.D., Michael Lua M.D., Almaz Bartlett M.D. Committee Reviewers: Greta Mishra M.D., & Juanito Salazar M.D. Expert Reviewer:  Sarai Martel M.D.

## 2020-05-06 NOTE — PROGRESS NOTES
Confirm Patient Counseling is Complete. The patient's Program Status is Qualified to Receive Drug. The patient may obtain their prescription at the pharmacy. The patient must obtain the prescription before 11:59 PM Eastern Time on 06/04/2020 .      Pt advised
PA initiated.   Mailed iPledge consent to pt with return envelope
(AMLACTIN) 12 % cream APPLY TOPICALLY AS NEEDED. (Patient taking differently: Apply topically as needed Apply topically as needed.) 280 g 2    amitriptyline (ELAVIL) 25 MG tablet TAKE 2 TAB NIGHTLY (Patient taking differently: Take 25 mg by mouth nightly TAKE 2 TAB NIGHTLY) 60 tablet 5    Lancet Devices (SIMPLE DIAGNOSTICS LANCING DEV) MISC USE TO TEST BLOOD SUGAR 4 TIMES A DAY 1 each 3    TRUE [employer] BLOOD GLUCOSE TEST strip TEST BLOOD SUGAR 3 TO 4 TIMES DAILY 100 each 1    PHARMACIST CHOICE ALCOHOL 70 % PADS USE BEFORE TESTING SUGAR (4 TIMES) PLUS BEFORE USING INSULIN (5 TIMES A DAY) 270 each 11     No current facility-administered medications for this visit. ALLERGIES:   Allergies   Allergen Reactions    Codeine Itching    Corticosteroids Swelling     Injection site swelling    Doxycycline      GERD    Ibuprofen Other (See Comments)     GERD    Metformin And Related Diarrhea       SOCIAL HISTORY:  Social History     Tobacco Use    Smoking status: Former Smoker     Packs/day: 3.00     Years: 20.00     Pack years: 60.00     Types: Cigarettes     Start date: 10/1987     Last attempt to quit: 2010     Years since quitting: 10.3    Smokeless tobacco: Never Used    Tobacco comment: Currently Vapes   Substance Use Topics    Alcohol use: Yes     Frequency: Monthly or less       REVIEW OF SYSTEMS:  Review of Systems   Constitutional: Negative. Skin:Denies any new changing, growing or bleeding lesions or rashes except as described in the HPI     PHYSICAL EXAM:   There were no vitals taken for this visit. General Exam:  General Appearance: No acute distress, Well nourished     Neuro: Alert and oriented to person, place and time  Psych: Normal affect   Lymph Node: Not performed    Cutaneous Exam: Performed as documented in clinic note below. Sun-exposed skin,which includes the head/face, neck, both arms, digits and/or nails was examined.     Due to this being a TeleHealth encounter, evaluation of

## 2020-05-07 ENCOUNTER — TELEPHONE (OUTPATIENT)
Dept: NEUROSURGERY | Age: 50
End: 2020-05-07

## 2020-05-07 NOTE — TELEPHONE ENCOUNTER
Confirm Patient Counseling is Complete. The patient's Program Status is Qualified to Receive Drug. The patient may obtain their prescription at the pharmacy.  The patient must obtain the prescription before 11:59 PM [place] Time on 06/05/2020          He is already registered

## 2020-05-15 RX ORDER — ATORVASTATIN CALCIUM 20 MG/1
20 TABLET, FILM COATED ORAL NIGHTLY
Qty: 30 TABLET | Refills: 5 | Status: SHIPPED | OUTPATIENT
Start: 2020-05-15 | End: 2020-11-09

## 2020-05-15 NOTE — TELEPHONE ENCOUNTER
Next Visit Date:  Future Appointments   Date Time Provider Bryson Leei   5/26/2020  3:30 PM Elizabeth Corral [facility]   6/3/2020  9:30 AM Marisa Sheppard MD  derm [facility]   6/15/2020  8:50 AM DO Annie Guerrero Neuro [facility]   6/30/2020  1:30 PM Letitia Libman, DPM [facility]   7/22/2020  8:50 AM DO Annie Guerrero Neuro [facility]   9/8/2020  1:20 PM Geneva Gonzalez MD Neuro Spec [address] Maintenance   Topic Date Due    Diabetic retinal exam  02/16/1979    HIV screen  02/16/1984    Hepatitis B vaccine (1 of 3 - Risk 3-dose series) 02/16/1988    Colon cancer screen colonoscopy  02/16/2019    Shingles Vaccine (1 of 2) 08/14/2020 (Originally 2/16/2019)    Potassium monitoring  02/10/2021    Creatinine monitoring  02/10/2021    A1C test (Diabetic or Prediabetic)  02/12/2021    Diabetic microalbuminuria test  02/12/2021    Lipid screen  02/12/2021    Diabetic foot exam  04/28/2021    DTaP/Tdap/Td vaccine (2 - Td) 01/16/2029    Flu vaccine  Completed    Pneumococcal 0-64 years Vaccine  Completed    Hepatitis A vaccine  Aged Out    Hib vaccine  Aged Out    Meningococcal (ACWY) vaccine  Aged Out       Hemoglobin A1C (%)   Date Value   02/12/2020 5.4   11/15/2019 5.5   08/14/2019 5.3             ( goal A1C is < 7)   Microalb/Crt.  Ratio (mcg/mg creat)   Date Value   02/12/2020 23 (H)     LDL Cholesterol (mg/dL)   Date Value   02/12/2020 88     LDL Calculated (mg/dL)   Date Value   11/19/2019 164 (A)       (goal LDL is <100)   AST (U/L)   Date Value   11/19/2019 26     ALT (U/L)   Date Value   11/19/2019 25     BUN (mg/dL)   Date Value   02/10/2020 14     BP Readings from Last 3 Encounters:   03/05/20 122/71   02/21/20 (!) 130/59   02/21/20 113/82          (goal 120/80)    All Future Testing planned in [facility]  Lab Frequency Next Occurrence   OT eval and treat Once 06/05/2019   PT eval and treat Once 09/05/2019   Microalbumin,

## 2020-05-26 ENCOUNTER — HOSPITAL ENCOUNTER (OUTPATIENT)
Dept: PREADMISSION TESTING | Age: 50
Setting detail: SPECIMEN
Discharge: HOME | End: 2020-05-30
Payer: MEDICARE

## 2020-05-26 PROCEDURE — U0004 COV-19 TEST NON-CDC HGH THRU: HCPCS

## 2020-05-26 RX ORDER — BENZOYL PEROXIDE 50 MG/ML
LIQUID TOPICAL
Qty: 227 G | Refills: 3 | Status: SHIPPED | OUTPATIENT
Start: 2020-05-26

## 2020-05-26 NOTE — TELEPHONE ENCOUNTER
Future Appointments   Date Time Provider Bryson Leei   5/26/2020  3:30 PM Ruddy Shah, [address] [place] [address]   6/3/2020  9:30 AM Rosalinda Ma MD mh derm [facility]   6/15/2020  8:50 AM Darius Dockery, DO Annie [facility]   6/30/2020  1:30 PM Chioma Ovalles DPM [facility]   7/22/2020  8:50 AM Dirk Jovel, DO [facility]   9/8/2020  1:20 PM Jing. Nancy Moreno MD Neuro Spec [address] [facility]

## 2020-05-27 LAB
SARS-COV-2, PCR: NOT DETECTED
SARS-COV-2, RAPID: NORMAL
SARS-COV-2: NORMAL
SOURCE: NORMAL

## 2020-05-28 ENCOUNTER — ANESTHESIA (OUTPATIENT)
Dept: OPERATING ROOM | Age: 50
End: 2020-05-28
Payer: MEDICARE

## 2020-05-28 ENCOUNTER — ANESTHESIA EVENT (OUTPATIENT)
Dept: OPERATING ROOM | Age: 50
End: 2020-05-28
Payer: MEDICARE

## 2020-05-28 ENCOUNTER — HOSPITAL ENCOUNTER (OUTPATIENT)
Age: 50
Setting detail: OUTPATIENT SURGERY
Discharge: HOME | End: 2020-05-28
Attending: NEUROLOGICAL SURGERY | Admitting: NEUROLOGICAL SURGERY
Payer: MEDICARE

## 2020-05-28 VITALS
RESPIRATION RATE: 21 BRPM | DIASTOLIC BLOOD PRESSURE: 60 MMHG | TEMPERATURE: 97.5 F | WEIGHT: 269 LBS | HEART RATE: 85 BPM | HEIGHT: 73 IN | OXYGEN SATURATION: 92 % | SYSTOLIC BLOOD PRESSURE: 101 MMHG | BODY MASS INDEX: 35.65 KG/M2

## 2020-05-28 VITALS — TEMPERATURE: 98.6 F | OXYGEN SATURATION: 97 % | SYSTOLIC BLOOD PRESSURE: 119 MMHG | DIASTOLIC BLOOD PRESSURE: 100 MMHG

## 2020-05-28 PROBLEM — G56.21 ULNAR NEUROPATHY OF RIGHT UPPER EXTREMITY: Status: ACTIVE | Noted: 2020-05-28

## 2020-05-28 PROBLEM — G56.11 RIGHT MEDIAN NERVE NEUROPATHY: Status: ACTIVE | Noted: 2020-05-28

## 2020-05-28 PROBLEM — R22.31 MASS OF RIGHT ELBOW: Status: ACTIVE | Noted: 2020-05-28

## 2020-05-28 LAB
ABO/RH: NORMAL
ANTIBODY SCREEN: NEGATIVE
ARM BAND NUMBER: NORMAL
EXPIRATION DATE: NORMAL
GLUCOSE BLD-MCNC: 123 MG/DL (ref 75–110)
POC IONIZED CALCIUM: 1.27 MMOL/L (ref 1.15–1.33)
POC POTASSIUM: 3.9 MMOL/L (ref 3.5–4.5)

## 2020-05-28 PROCEDURE — 2709999900 HC NON-CHARGEABLE SUPPLY: Performed by: NEUROLOGICAL SURGERY

## 2020-05-28 PROCEDURE — 7100000010 HC PHASE II RECOVERY - FIRST 15 MIN: Performed by: NEUROLOGICAL SURGERY

## 2020-05-28 PROCEDURE — 86900 BLOOD TYPING SEROLOGIC ABO: CPT

## 2020-05-28 PROCEDURE — 6360000002 HC RX W HCPCS: Performed by: NURSE ANESTHETIST, CERTIFIED REGISTERED

## 2020-05-28 PROCEDURE — 6360000002 HC RX W HCPCS

## 2020-05-28 PROCEDURE — 6370000000 HC RX 637 (ALT 250 FOR IP): Performed by: NURSE ANESTHETIST, CERTIFIED REGISTERED

## 2020-05-28 PROCEDURE — 2500000003 HC RX 250 WO HCPCS: Performed by: NEUROLOGICAL SURGERY

## 2020-05-28 PROCEDURE — 3600000004 HC SURGERY LEVEL 4 BASE: Performed by: NEUROLOGICAL SURGERY

## 2020-05-28 PROCEDURE — 6370000000 HC RX 637 (ALT 250 FOR IP): Performed by: NEUROLOGICAL SURGERY

## 2020-05-28 PROCEDURE — 3600000014 HC SURGERY LEVEL 4 ADDTL 15MIN: Performed by: NEUROLOGICAL SURGERY

## 2020-05-28 PROCEDURE — 2580000003 HC RX 258: Performed by: ANESTHESIOLOGY

## 2020-05-28 PROCEDURE — 36415 COLL VENOUS BLD VENIPUNCTURE: CPT

## 2020-05-28 PROCEDURE — 2580000003 HC RX 258: Performed by: NURSE ANESTHETIST, CERTIFIED REGISTERED

## 2020-05-28 PROCEDURE — 86850 RBC ANTIBODY SCREEN: CPT

## 2020-05-28 PROCEDURE — 86901 BLOOD TYPING SEROLOGIC RH(D): CPT

## 2020-05-28 PROCEDURE — 3700000001 HC ADD 15 MINUTES (ANESTHESIA): Performed by: NEUROLOGICAL SURGERY

## 2020-05-28 PROCEDURE — 2500000003 HC RX 250 WO HCPCS: Performed by: NURSE ANESTHETIST, CERTIFIED REGISTERED

## 2020-05-28 PROCEDURE — 88304 TISSUE EXAM BY PATHOLOGIST: CPT

## 2020-05-28 PROCEDURE — 3700000000 HC ANESTHESIA ATTENDED CARE: Performed by: NEUROLOGICAL SURGERY

## 2020-05-28 PROCEDURE — 7100000001 HC PACU RECOVERY - ADDTL 15 MIN: Performed by: NEUROLOGICAL SURGERY

## 2020-05-28 PROCEDURE — 82330 ASSAY OF CALCIUM: CPT

## 2020-05-28 PROCEDURE — 6370000000 HC RX 637 (ALT 250 FOR IP): Performed by: ANESTHESIOLOGY

## 2020-05-28 PROCEDURE — 82947 ASSAY GLUCOSE BLOOD QUANT: CPT

## 2020-05-28 PROCEDURE — 64718 REVISE ULNAR NERVE AT ELBOW: CPT | Performed by: NEUROLOGICAL SURGERY

## 2020-05-28 PROCEDURE — 7100000000 HC PACU RECOVERY - FIRST 15 MIN: Performed by: NEUROLOGICAL SURGERY

## 2020-05-28 PROCEDURE — 84132 ASSAY OF SERUM POTASSIUM: CPT

## 2020-05-28 PROCEDURE — 2580000003 HC RX 258: Performed by: NEUROLOGICAL SURGERY

## 2020-05-28 PROCEDURE — 64721 CARPAL TUNNEL SURGERY: CPT | Performed by: NEUROLOGICAL SURGERY

## 2020-05-28 PROCEDURE — 7100000011 HC PHASE II RECOVERY - ADDTL 15 MIN: Performed by: NEUROLOGICAL SURGERY

## 2020-05-28 PROCEDURE — 6360000002 HC RX W HCPCS: Performed by: ANESTHESIOLOGY

## 2020-05-28 RX ORDER — DIPHENHYDRAMINE HYDROCHLORIDE 50 MG/ML
INJECTION INTRAMUSCULAR; INTRAVENOUS PRN
Status: DISCONTINUED | OUTPATIENT
Start: 2020-05-28 | End: 2020-05-28 | Stop reason: SDUPTHER

## 2020-05-28 RX ORDER — GLYCOPYRROLATE 1 MG/5 ML
SYRINGE (ML) INTRAVENOUS PRN
Status: DISCONTINUED | OUTPATIENT
Start: 2020-05-28 | End: 2020-05-28 | Stop reason: SDUPTHER

## 2020-05-28 RX ORDER — CEFAZOLIN SODIUM 2 G/50ML
SOLUTION INTRAVENOUS PRN
Status: DISCONTINUED | OUTPATIENT
Start: 2020-05-28 | End: 2020-05-28 | Stop reason: SDUPTHER

## 2020-05-28 RX ORDER — OXYCODONE HYDROCHLORIDE AND ACETAMINOPHEN 5; 325 MG/1; MG/1
1 TABLET ORAL EVERY 6 HOURS PRN
Qty: 28 TABLET | Refills: 0 | Status: SHIPPED | OUTPATIENT
Start: 2020-05-28 | End: 2020-06-04

## 2020-05-28 RX ORDER — LIDOCAINE HYDROCHLORIDE 10 MG/ML
INJECTION, SOLUTION INFILTRATION; PERINEURAL PRN
Status: DISCONTINUED | OUTPATIENT
Start: 2020-05-28 | End: 2020-05-28 | Stop reason: ALTCHOICE

## 2020-05-28 RX ORDER — FENTANYL CITRATE 50 UG/ML
INJECTION, SOLUTION INTRAMUSCULAR; INTRAVENOUS PRN
Status: DISCONTINUED | OUTPATIENT
Start: 2020-05-28 | End: 2020-05-28 | Stop reason: SDUPTHER

## 2020-05-28 RX ORDER — ROCURONIUM BROMIDE 10 MG/ML
INJECTION, SOLUTION INTRAVENOUS PRN
Status: DISCONTINUED | OUTPATIENT
Start: 2020-05-28 | End: 2020-05-28 | Stop reason: SDUPTHER

## 2020-05-28 RX ORDER — SCOLOPAMINE TRANSDERMAL SYSTEM 1 MG/1
PATCH, EXTENDED RELEASE TRANSDERMAL PRN
Status: DISCONTINUED | OUTPATIENT
Start: 2020-05-28 | End: 2020-05-28 | Stop reason: SDUPTHER

## 2020-05-28 RX ORDER — CEPHALEXIN 500 MG/1
500 CAPSULE ORAL 2 TIMES DAILY
Qty: 14 CAPSULE | Refills: 0 | Status: SHIPPED | OUTPATIENT
Start: 2020-05-28 | End: 2020-06-04

## 2020-05-28 RX ORDER — LIDOCAINE HYDROCHLORIDE 10 MG/ML
INJECTION, SOLUTION EPIDURAL; INFILTRATION; INTRACAUDAL; PERINEURAL PRN
Status: DISCONTINUED | OUTPATIENT
Start: 2020-05-28 | End: 2020-05-28 | Stop reason: SDUPTHER

## 2020-05-28 RX ORDER — SODIUM CHLORIDE, SODIUM LACTATE, POTASSIUM CHLORIDE, CALCIUM CHLORIDE 600; 310; 30; 20 MG/100ML; MG/100ML; MG/100ML; MG/100ML
INJECTION, SOLUTION INTRAVENOUS CONTINUOUS PRN
Status: DISCONTINUED | OUTPATIENT
Start: 2020-05-28 | End: 2020-05-28

## 2020-05-28 RX ORDER — GINSENG 100 MG
CAPSULE ORAL PRN
Status: DISCONTINUED | OUTPATIENT
Start: 2020-05-28 | End: 2020-05-28 | Stop reason: ALTCHOICE

## 2020-05-28 RX ORDER — SODIUM CHLORIDE, SODIUM LACTATE, POTASSIUM CHLORIDE, CALCIUM CHLORIDE 600; 310; 30; 20 MG/100ML; MG/100ML; MG/100ML; MG/100ML
INJECTION, SOLUTION INTRAVENOUS CONTINUOUS
Status: DISCONTINUED | OUTPATIENT
Start: 2020-05-28 | End: 2020-05-28 | Stop reason: HOSPADM

## 2020-05-28 RX ORDER — NEOSTIGMINE METHYLSULFATE 5 MG/5 ML
SYRINGE (ML) INTRAVENOUS PRN
Status: DISCONTINUED | OUTPATIENT
Start: 2020-05-28 | End: 2020-05-28 | Stop reason: SDUPTHER

## 2020-05-28 RX ORDER — MAGNESIUM HYDROXIDE 1200 MG/15ML
LIQUID ORAL CONTINUOUS PRN
Status: COMPLETED | OUTPATIENT
Start: 2020-05-28 | End: 2020-05-28

## 2020-05-28 RX ORDER — DOCUSATE SODIUM 100 MG/1
100 CAPSULE, LIQUID FILLED ORAL 2 TIMES DAILY PRN
Qty: 60 CAPSULE | Refills: 0 | Status: SHIPPED | OUTPATIENT
Start: 2020-05-28 | End: 2020-08-03 | Stop reason: ALTCHOICE

## 2020-05-28 RX ORDER — ONDANSETRON 2 MG/ML
INJECTION INTRAMUSCULAR; INTRAVENOUS PRN
Status: DISCONTINUED | OUTPATIENT
Start: 2020-05-28 | End: 2020-05-28 | Stop reason: SDUPTHER

## 2020-05-28 RX ORDER — PHENYLEPHRINE HCL IN 0.9% NACL 0.5 MG/5ML
SYRINGE (ML) INTRAVENOUS PRN
Status: DISCONTINUED | OUTPATIENT
Start: 2020-05-28 | End: 2020-05-28 | Stop reason: SDUPTHER

## 2020-05-28 RX ORDER — PROPOFOL 10 MG/ML
INJECTION, EMULSION INTRAVENOUS PRN
Status: DISCONTINUED | OUTPATIENT
Start: 2020-05-28 | End: 2020-05-28 | Stop reason: SDUPTHER

## 2020-05-28 RX ORDER — OXYCODONE HYDROCHLORIDE AND ACETAMINOPHEN 5; 325 MG/1; MG/1
1 TABLET ORAL
Status: COMPLETED | OUTPATIENT
Start: 2020-05-28 | End: 2020-05-28

## 2020-05-28 RX ORDER — EPHEDRINE SULFATE/0.9% NACL/PF 50 MG/5 ML
SYRINGE (ML) INTRAVENOUS PRN
Status: DISCONTINUED | OUTPATIENT
Start: 2020-05-28 | End: 2020-05-28 | Stop reason: SDUPTHER

## 2020-05-28 RX ADMIN — ROCURONIUM BROMIDE 50 MG: 10 INJECTION INTRAVENOUS at 07:24

## 2020-05-28 RX ADMIN — FENTANYL CITRATE 50 MCG: 50 INJECTION INTRAMUSCULAR; INTRAVENOUS at 07:24

## 2020-05-28 RX ADMIN — Medication 200 MCG: at 09:45

## 2020-05-28 RX ADMIN — OXYCODONE HYDROCHLORIDE AND ACETAMINOPHEN 1 TABLET: 5; 325 TABLET ORAL at 13:38

## 2020-05-28 RX ADMIN — SCOPALAMINE 1 PATCH: 1 PATCH, EXTENDED RELEASE TRANSDERMAL at 07:38

## 2020-05-28 RX ADMIN — LIDOCAINE HYDROCHLORIDE 50 MG: 10 INJECTION, SOLUTION EPIDURAL; INFILTRATION; INTRACAUDAL; PERINEURAL at 07:24

## 2020-05-28 RX ADMIN — Medication 0.2 MG: at 07:51

## 2020-05-28 RX ADMIN — ONDANSETRON 4 MG: 2 INJECTION INTRAMUSCULAR; INTRAVENOUS at 10:55

## 2020-05-28 RX ADMIN — HYDROMORPHONE HYDROCHLORIDE 0.5 MG: 1 INJECTION, SOLUTION INTRAMUSCULAR; INTRAVENOUS; SUBCUTANEOUS at 12:24

## 2020-05-28 RX ADMIN — Medication 12.5 MG: at 07:42

## 2020-05-28 RX ADMIN — FENTANYL CITRATE 50 MCG: 50 INJECTION INTRAMUSCULAR; INTRAVENOUS at 07:20

## 2020-05-28 RX ADMIN — CEFAZOLIN SODIUM 3 G: 2 SOLUTION INTRAVENOUS at 07:22

## 2020-05-28 RX ADMIN — Medication 0.2 MG: at 10:57

## 2020-05-28 RX ADMIN — Medication 5 MG: at 08:06

## 2020-05-28 RX ADMIN — ROCURONIUM BROMIDE 10 MG: 10 INJECTION INTRAVENOUS at 09:19

## 2020-05-28 RX ADMIN — Medication 200 MCG: at 07:51

## 2020-05-28 RX ADMIN — Medication 5 MG: at 08:18

## 2020-05-28 RX ADMIN — Medication 2 MG: at 10:58

## 2020-05-28 RX ADMIN — Medication 200 MCG: at 07:57

## 2020-05-28 RX ADMIN — Medication 10 MG: at 08:10

## 2020-05-28 RX ADMIN — SODIUM CHLORIDE, POTASSIUM CHLORIDE, SODIUM LACTATE AND CALCIUM CHLORIDE: 600; 310; 30; 20 INJECTION, SOLUTION INTRAVENOUS at 07:18

## 2020-05-28 RX ADMIN — PHENYLEPHRINE HYDROCHLORIDE 30 MCG: 10 INJECTION INTRAVENOUS at 08:18

## 2020-05-28 RX ADMIN — ROCURONIUM BROMIDE 10 MG: 10 INJECTION INTRAVENOUS at 08:14

## 2020-05-28 RX ADMIN — Medication 100 MCG: at 07:45

## 2020-05-28 RX ADMIN — PROPOFOL 200 MG: 10 INJECTION, EMULSION INTRAVENOUS at 07:24

## 2020-05-28 RX ADMIN — Medication 100 MCG: at 09:37

## 2020-05-28 RX ADMIN — ROCURONIUM BROMIDE 10 MG: 10 INJECTION INTRAVENOUS at 08:36

## 2020-05-28 ASSESSMENT — PULMONARY FUNCTION TESTS
PIF_VALUE: 19
PIF_VALUE: 16
PIF_VALUE: 3
PIF_VALUE: 21
PIF_VALUE: 20
PIF_VALUE: 3
PIF_VALUE: 21
PIF_VALUE: 19
PIF_VALUE: 21
PIF_VALUE: 11
PIF_VALUE: 19
PIF_VALUE: 20
PIF_VALUE: 22
PIF_VALUE: 3
PIF_VALUE: 21
PIF_VALUE: 19
PIF_VALUE: 3
PIF_VALUE: 21
PIF_VALUE: 2
PIF_VALUE: 17
PIF_VALUE: 19
PIF_VALUE: 20
PIF_VALUE: 20
PIF_VALUE: 12
PIF_VALUE: 21
PIF_VALUE: 19
PIF_VALUE: 21
PIF_VALUE: 21
PIF_VALUE: 12
PIF_VALUE: 21
PIF_VALUE: 1
PIF_VALUE: 2
PIF_VALUE: 18
PIF_VALUE: 21
PIF_VALUE: 3
PIF_VALUE: 19
PIF_VALUE: 3
PIF_VALUE: 22
PIF_VALUE: 19
PIF_VALUE: 12
PIF_VALUE: 19
PIF_VALUE: 3
PIF_VALUE: 18
PIF_VALUE: 13
PIF_VALUE: 20
PIF_VALUE: 19
PIF_VALUE: 21
PIF_VALUE: 19
PIF_VALUE: 16
PIF_VALUE: 19
PIF_VALUE: 21
PIF_VALUE: 19
PIF_VALUE: 20
PIF_VALUE: 19
PIF_VALUE: 18
PIF_VALUE: 21
PIF_VALUE: 19
PIF_VALUE: 11
PIF_VALUE: 21
PIF_VALUE: 19
PIF_VALUE: 21
PIF_VALUE: 20
PIF_VALUE: 20
PIF_VALUE: 19
PIF_VALUE: 19
PIF_VALUE: 9
PIF_VALUE: 18
PIF_VALUE: 11
PIF_VALUE: 4
PIF_VALUE: 18
PIF_VALUE: 19
PIF_VALUE: 21
PIF_VALUE: 21
PIF_VALUE: 20
PIF_VALUE: 26
PIF_VALUE: 27
PIF_VALUE: 21
PIF_VALUE: 20
PIF_VALUE: 19
PIF_VALUE: 3
PIF_VALUE: 19
PIF_VALUE: 21
PIF_VALUE: 21
PIF_VALUE: 11
PIF_VALUE: 21
PIF_VALUE: 21
PIF_VALUE: 3
PIF_VALUE: 23
PIF_VALUE: 21
PIF_VALUE: 12
PIF_VALUE: 20
PIF_VALUE: 10
PIF_VALUE: 21
PIF_VALUE: 3
PIF_VALUE: 20
PIF_VALUE: 1
PIF_VALUE: 3
PIF_VALUE: 12
PIF_VALUE: 19
PIF_VALUE: 21
PIF_VALUE: 21
PIF_VALUE: 19
PIF_VALUE: 21
PIF_VALUE: 18
PIF_VALUE: 19
PIF_VALUE: 19
PIF_VALUE: 20
PIF_VALUE: 16
PIF_VALUE: 3
PIF_VALUE: 19
PIF_VALUE: 21
PIF_VALUE: 19
PIF_VALUE: 23
PIF_VALUE: 1
PIF_VALUE: 19
PIF_VALUE: 21
PIF_VALUE: 22
PIF_VALUE: 19
PIF_VALUE: 20
PIF_VALUE: 23
PIF_VALUE: 2
PIF_VALUE: 2
PIF_VALUE: 21
PIF_VALUE: 21
PIF_VALUE: 3
PIF_VALUE: 3
PIF_VALUE: 19
PIF_VALUE: 21
PIF_VALUE: 3
PIF_VALUE: 12
PIF_VALUE: 3
PIF_VALUE: 20
PIF_VALUE: 4
PIF_VALUE: 2
PIF_VALUE: 18
PIF_VALUE: 18
PIF_VALUE: 23
PIF_VALUE: 19
PIF_VALUE: 3
PIF_VALUE: 19
PIF_VALUE: 25
PIF_VALUE: 1
PIF_VALUE: 21
PIF_VALUE: 19
PIF_VALUE: 21
PIF_VALUE: 12
PIF_VALUE: 3
PIF_VALUE: 20
PIF_VALUE: 12
PIF_VALUE: 20
PIF_VALUE: 3
PIF_VALUE: 19
PIF_VALUE: 21
PIF_VALUE: 19
PIF_VALUE: 18
PIF_VALUE: 21
PIF_VALUE: 20
PIF_VALUE: 12
PIF_VALUE: 17
PIF_VALUE: 11
PIF_VALUE: 14
PIF_VALUE: 3
PIF_VALUE: 11
PIF_VALUE: 23
PIF_VALUE: 20
PIF_VALUE: 3
PIF_VALUE: 21
PIF_VALUE: 19
PIF_VALUE: 21
PIF_VALUE: 22
PIF_VALUE: 1
PIF_VALUE: 3
PIF_VALUE: 21
PIF_VALUE: 18
PIF_VALUE: 19
PIF_VALUE: 18
PIF_VALUE: 3
PIF_VALUE: 9
PIF_VALUE: 19
PIF_VALUE: 11
PIF_VALUE: 20
PIF_VALUE: 20
PIF_VALUE: 19
PIF_VALUE: 18
PIF_VALUE: 12
PIF_VALUE: 20
PIF_VALUE: 12
PIF_VALUE: 14
PIF_VALUE: 21
PIF_VALUE: 3
PIF_VALUE: 19
PIF_VALUE: 20
PIF_VALUE: 27
PIF_VALUE: 19
PIF_VALUE: 20
PIF_VALUE: 12
PIF_VALUE: 42
PIF_VALUE: 18
PIF_VALUE: 20
PIF_VALUE: 3
PIF_VALUE: 13
PIF_VALUE: 14
PIF_VALUE: 19
PIF_VALUE: 19
PIF_VALUE: 3
PIF_VALUE: 18
PIF_VALUE: 21
PIF_VALUE: 22
PIF_VALUE: 14
PIF_VALUE: 23
PIF_VALUE: 20
PIF_VALUE: 21
PIF_VALUE: 19
PIF_VALUE: 19
PIF_VALUE: 18
PIF_VALUE: 19
PIF_VALUE: 21
PIF_VALUE: 21
PIF_VALUE: 12
PIF_VALUE: 12
PIF_VALUE: 19
PIF_VALUE: 20
PIF_VALUE: 19
PIF_VALUE: 21
PIF_VALUE: 20
PIF_VALUE: 21
PIF_VALUE: 19
PIF_VALUE: 1
PIF_VALUE: 18
PIF_VALUE: 23
PIF_VALUE: 3
PIF_VALUE: 20

## 2020-05-28 ASSESSMENT — PAIN SCALES - GENERAL
PAINLEVEL_OUTOF10: 0
PAINLEVEL_OUTOF10: 10
PAINLEVEL_OUTOF10: 6
PAINLEVEL_OUTOF10: 0

## 2020-05-28 ASSESSMENT — PAIN DESCRIPTION - DESCRIPTORS: DESCRIPTORS: NUMBNESS;TINGLING

## 2020-05-28 ASSESSMENT — PAIN DESCRIPTION - LOCATION: LOCATION: ARM

## 2020-05-28 ASSESSMENT — PAIN DESCRIPTION - ORIENTATION: ORIENTATION: RIGHT

## 2020-05-28 ASSESSMENT — PAIN - FUNCTIONAL ASSESSMENT: PAIN_FUNCTIONAL_ASSESSMENT: 0-10

## 2020-05-28 ASSESSMENT — PAIN DESCRIPTION - PAIN TYPE: TYPE: SURGICAL PAIN

## 2020-05-28 NOTE — H&P
and cheeks twice daily 12/6/19  Yes Valentino Neal MD   omeprazole (PRILOSEC) 20 MG delayed release capsule Take 1 capsule by mouth every morning (before breakfast) 11/15/19  Yes BRENNON Sanchez CNP   blood glucose monitor strips Test 4 times a day & as needed for symptoms of irregular blood glucose. 10/22/19  Yes BRENNON Martin CNP   insulin lispro (HUMALOG KWIKPEN) 100 UNIT/ML pen TIDbefore meals 141-180 6units,181-220 8units,221-260 10units,261-300 12units,301-350 14units,351-400 16 units,>400 18units [id number] daily  Patient taking differently: Inject into the skin 4 times daily (after meals and at bedtime) TIDbefore meals 141-180 6units,181-220 8units,221-260 10units,261-300 12units,301-350 14units,351-400 16 units,>400 18units [id number] daily 10/17/19  Yes BRENNON Sanchez CNP   insulin glargine (BASAGLAR KWIKPEN) 100 UNIT/ML injection pen Inject 26 Units into the skin nightly  Patient taking differently: Inject into the skin 2 times daily 10 units in AM 10 Units bed time 10/17/19  Yes BRENNON Sanchez CNP   Insulin Pen Needle (COMFORT EZ PEN NEEDLES) 32G X 4 MM MISC Use 5 times daily. Change needle each time with Victoza, Basaglar, and Humalog 10/15/19  Yes BRENNON Sanchez CNP   ammonium lactate (AMLACTIN) 12 % cream APPLY TOPICALLY AS NEEDED. Patient taking differently: Apply topically as needed Apply topically as needed. 10/4/19  Yes Cara Gan DPM   Lancet Devices (SIMPLE DIAGNOSTICS LANCING DEV) MISC USE TO TEST BLOOD SUGAR 4 TIMES A DAY 3/14/19  Yes BRENNON Sanchez CNP   TRUE METRIX BLOOD GLUCOSE TEST strip TEST BLOOD SUGAR 3 TO 4 TIMES DAILY 3/13/19  Yes BRENNON Sanchez CNP   PHARMACIST CHOICE ALCOHOL 70 % PADS USE BEFORE TESTING SUGAR (4 TIMES) PLUS BEFORE USING INSULIN (5 TIMES A DAY) 3/13/19  Yes BRENNON Sanchez - CNP   GLUCAGON EMERGENCY 1 MG injection  2/12/20   Historical Provider, MD       Allergies:    Codeine; Corticosteroids; Doxycycline;  Ibuprofen; and Metformin and related    Social History:   Social History     Socioeconomic History    Marital status:      Spouse name: Luis Mackay Number of children: 3    Years of education: None    Highest education level: None   Occupational History    None   Social Needs    Financial resource strain: None    Food insecurity     Worry: None     Inability: None    Transportation needs     Medical: None     Non-medical: None   Tobacco Use    Smoking status: Former Smoker     Packs/day: 3.00     Years: 20.00     Pack years: 60.00     Types: Cigarettes     Start date: 10/1987     Last attempt to quit: 2010     Years since quitting: 10.4    Smokeless tobacco: Never Used    Tobacco comment: Currently Vapes   Substance and Sexual Activity    Alcohol use: Yes     Frequency: Monthly or less     Comment: SOCIAL    Drug use: Not Currently    Sexual activity: Yes     Partners: Female   Lifestyle    Physical activity     Days per week: None     Minutes per session: None    Stress: None   Relationships    Social connections     Talks on phone: None     Gets together: None     Attends Zoroastrianism service: None     Active member of club or organization: None     Attends meetings of clubs or organizations: None     Relationship status: None    Intimate partner violence     Fear of current or ex partner: None     Emotionally abused: None     Physically abused: None     Forced sexual activity: None   Other Topics Concern    None   Social History Narrative    None       Family History:  Family History   Problem Relation Age of Onset    High Cholesterol Mother     Depression Mother     Heart Disease Father         stents    High Cholesterol Father     Alcohol Abuse Father     Heart Surgery Father         CABG    Heart Attack Father     Lung Cancer Father         primary, transferred to the bones    Dementia Father     Diabetes Maternal Grandmother     Diabetes Maternal Grandfather     No Known Problems Paternal

## 2020-05-28 NOTE — ANESTHESIA PRE PROCEDURE
BEFORE USING INSULIN (5 TIMES A DAY) 3/31/20  Yes BRENNON Sanchez CNP   TRUEplus Lancets 33G MISC USE TO TEST BLOOD SUGAR FOUR TIMES DAILY 3/20/20  Yes BRENNON Bauer CNP   aspirin 81 MG tablet Take 81 mg by mouth daily   Yes Historical Provider, MD   lisinopril (PRINIVIL;ZESTRIL) 5 MG tablet Take 5 mg by mouth nightly    Yes Historical Provider, MD   metoprolol succinate (TOPROL XL) 25 MG extended release tablet Take 25 mg by mouth daily   Yes Historical Provider, MD   nystatin (MYCOSTATIN) 616986 UNIT/GM cream Apply topically 2 times daily. 2/12/20  Yes BRENNON Sanchez CNP   Semaglutide (OZEMPIC, 1 MG/DOSE, SC) Inject 1 mg into the skin once a week Every Sunday   Yes Historical Provider, MD   Sulfacetamide Sodium 9.8 % LOTN Apply to nose and cheeks twice daily  Patient taking differently: Apply topically as needed Apply to nose and cheeks twice daily 12/6/19  Yes Sandrita Blackburn MD   omeprazole (PRILOSEC) 20 MG delayed release capsule Take 1 capsule by mouth every morning (before breakfast) 11/15/19  Yes BRENNON Sanchez CNP   blood glucose monitor strips Test 4 times a day & as needed for symptoms of irregular blood glucose.  10/22/19  Yes BRENNON Bauer CNP   insulin lispro (HUMALOG KWIKPEN) 100 UNIT/ML pen TIDbefore meals 141-180 6units,181-220 8units,221-260 10units,261-300 12units,301-350 14units,351-400 16 units,>400 18units [id number] daily  Patient taking differently: Inject into the skin 4 times daily (after meals and at bedtime) TIDbefore meals 141-180 6units,181-220 8units,221-260 10units,261-300 12units,301-350 14units,351-400 16 units,>400 18units [id number] daily 10/17/19  Yes BRENNON Sanchez CNP   insulin glargine (BASAGLAR KWIKPEN) 100 UNIT/ML injection pen Inject 26 Units into the skin nightly  Patient taking differently: Inject into the skin 2 times daily 10 units in AM 10 Units bed time 10/17/19  Yes BRENNON Bauer - CNP   Insulin Pen Needle (COMFORT EZ PEN NEEDLES) hypertension:,     (-) past MI, CAD, CABG/stent, dysrhythmias,  angina,  CHF, orthopnea and PND    ECG reviewed  Rhythm: regular  Rate: normal  Echocardiogram reviewed  Stress test reviewed                Neuro/Psych:   (+) neuromuscular disease:,             GI/Hepatic/Renal:   (+) morbid obesity          Endo/Other:    (+) DiabetesType II DM, , .                 Abdominal:           Vascular:                                      Anesthesia Plan      general     ASA 3     (GETA)  Induction: intravenous. MIPS: Postoperative opioids intended and Prophylactic antiemetics administered. Anesthetic plan and risks discussed with patient.       Plan discussed with CRNA and surgical team.                  Мария Reyes MD   5/28/2020

## 2020-05-28 NOTE — BRIEF OP NOTE
Brief Postoperative Note      Patient: Sergio Gonzalez  YOB: 1970  MRN: [id number]    Date of Procedure: 5/28/2020    Pre-Op Diagnosis: ULNAR & MEDIAN NERVE NEUROPATHY, RIGHT    Post-Op Diagnosis: Same       Procedure(s):  ULNAR NERVE RELEASE AT ELBOW (CUBITAL TUNNEL RELEASE)  ULNAR NERVE RELEASE AT WRIST (GUYON'S CANAL RELEASE)  MEDIAN NERVE RELEASE AT WRIST (CARPAL TUNNEL RELEASE)  MASS EXCISION RIGHT ELBOW    Surgeon(s):  Hiram Ga DO    Assistant:  Resident:  Belkis Haddad DO    Anesthesia: General    Estimated Blood Loss (mL): 50    Fluids: 1900 cc crystalloid    Complications: None    Specimens:   ID Type Source Tests Collected by Time Destination   A : RIGHT ELBOW MASS Tissue Elbow SURGICAL PATHOLOGY Dirk Parson DO 5/28/2020 0915        Implants:  * No implants in log *      Drains: * No LDAs found *    Findings: soft tissue mass right elbow, fibrotic and thickened transverse carpal ligament, volar carpal ligament, and cubital tunnel; see Op Note for details    Electronically signed by Belkis Haddad DO on 5/28/2020 at 11:24 AM

## 2020-05-28 NOTE — ANESTHESIA POSTPROCEDURE EVALUATION
Department of Anesthesiology  Postprocedure Note    Patient: Althea Rae  MRN: [id number]  YOB: 1970  Date of evaluation: 5/28/2020  Time:  2:14 PM     Procedure Summary     Date:  05/28/20 Room / Location:  [address]    Anesthesia Start:  0719 Anesthesia Stop:  0574    Procedure:  Simone Ko CANAL AND CARPAL TUNNEL RELEASE (Right Hand) Diagnosis:  (ULNAR NEUROPATHY)    Surgeon:  Dong Sharp DO Responsible Provider:  Any Kaur MD    Anesthesia Type:  general ASA Status:  3          Anesthesia Type: general    Tess Phase I: Tess Score: 10    Tess Phase II: Tess Score: 10    Last vitals: Reviewed and per EMR flowsheets.        Anesthesia Post Evaluation    Patient location during evaluation: PACU  Patient participation: complete - patient participated  Level of consciousness: awake and alert  Pain score: 5  Airway patency: patent  Nausea & Vomiting: no nausea and no vomiting  Complications: no  Cardiovascular status: hemodynamically stable  Respiratory status: room air  Hydration status: euvolemic

## 2020-05-28 NOTE — H&P
History and Physical    Pt Name: Tawnya Rich  MRN: [id number]  YOB: 1970  Date of evaluation: 5/28/2020    SUBJECTIVE:   History of Chief Complaint:    Patient complains of BUE numbness/tingling, pain. He has worked with hands, physical  for many years, [employer] [address] work. He has developed significant neuropathy, numbness, dropping items, etc.  He has bilateral symptoms, with right slightly worse than the left. He has been scheduled for Cubital, Guyon's canal and carpal tunnel release- right. Past Medical History    has a past medical history of Bundle branch block, left, Diabetes ([address] GERD (gastroesophageal reflux disease), History of echocardiogram, Hyperlipidemia, Hypertension, Kidney calculi, Lumbar radiculopathy, Non-ischemic cardiomyopathy ([address] Obstructive sleep apnea syndrome, PONV (postoperative nausea and vomiting), Prolonged emergence from general anesthesia, Restless legs syndrome, Severe obesity (BMI 35.0-39. 9) with comorbidity ([address] and Wears dentures. Past Surgical History   has a past surgical history that includes shoulder surgery (Right); Ankle surgery (Right); Glaucoma surgery (Bilateral, 12/2018); Colonoscopy (1990); Cardiac catheterization (01/2020); Injection Procedure For Sacroiliac Joint (Bilateral, 2/21/2020); and sinus surgery. Medications  Prior to Admission medications    Medication Sig Start Date End Date Taking?  Authorizing Provider   BENZOYL PEROXIDE 5 % external wash [address] CHEST AND BACK 1 TO 2 TIMES DAILY 5/26/20  Yes Joaquin Novak MD   carbidopa-levodopa (SINEMET)  MG per tablet TAKE 1/2 TABLET BY MOUTH AT 8PM AND 11PM 5/15/20  Yes Ada Daniels MD   atorvastatin (LIPITOR) 20 MG tablet Take 1 tablet by mouth nightly 5/15/20  Yes BRENNON Sanchez - CNP   ZINC OXIDE, TOPICAL, 10 % CREA Mix 1:1 with ketoconazole and apply to groin creases nightly 5/4/20  Yes Joaquin Novak MD   ketoconazole (NIZORAL) 2 % cream Mix 1:1 with in his mother; Diabetes in his maternal grandfather and maternal grandmother; Heart Attack in his father; Heart Disease in his father; Heart Surgery in his father; High Cholesterol in his father and mother; Kaye Aragon in his father; No Known Problems in his half-brother, half-sister, paternal grandfather, and paternal grandmother; Thyroid Disease in his daughter. Social History   reports that he quit smoking about 10 years ago. His smoking use included cigarettes. He started smoking about 32 years ago. He has a 60.00 pack-year smoking history. He has never used smokeless tobacco.  Up to 3 PPD for 23 years, quit 2010. reports current alcohol use. Socially. reports previous drug use. Marital Status   Occupation unemployed    OBJECTIVE:   VITALS:  height is 6' 1\" (1.854 m) and weight is 269 lb (122 kg). His temporal temperature is 97 °F (36.1 °C). His blood pressure is 122/72. His respiration is 16 and oxygen saturation is 96%. CONSTITUTIONAL:alert & oriented x 3, no acute distress. Very talkative. SKIN:  Warm and dry, no rashes. HEAD:  Normocephalic, atraumatic. EYES: PERRL. EOMs intact. Wearing glasses. EARS:  Hearing grossly WNL. NOSE:  Nares patent. No rhinorrhea   MOUTH/THROAT:  edentulous  NECK:supple, no lymphadenopathy. Thick neck. LUNGS: Clear to auscultation bilaterally, no wheezes. CARDIOVASCULAR: Heart sounds are normal.  Regular rate and rhythm without murmur. ABDOMEN: soft, non tender, non distended. Rotund. EXTREMITIES: no edema bilateral lower extremities. Decreased sensation BUE.  strength 3/5 bilaterally.      IMPRESSIONS:    right carpal and cubital tunnel syndrome  1.  has a past medical history of Bundle branch block, left, Diabetes ([address] GERD (gastroesophageal reflux disease), History of echocardiogram (2016), Hyperlipidemia, Hypertension, Kidney calculi, Lumbar radiculopathy (6/9/2017), Non-ischemic cardiomyopathy ([address] Obstructive sleep apnea syndrome

## 2020-05-28 NOTE — OP NOTE
Operative Note      Patient: Virginia Castillo  YOB: 1970  MRN: [id number]    Date of Procedure: 5/28/2020    Pre-Op Diagnosis: ULNAR NEUROPATHY, median neuropathy    Post-Op Diagnosis: Same       Procedure(s):  Cubital tunnel release  Carpal tunnel release  Guyon's canal release and ulnar nerve neural lysis    Surgeon(s):  Nida Kerns DO    Assistant:   Resident: Mp Franco DO    Anesthesia: General    Estimated Blood Loss (mL): Minimal    Complications: None    Specimens:   ID Type Source Tests Collected by Time Destination   A : RIGHT ELBOW MASS Tissue Elbow SURGICAL PATHOLOGY Dirk ParsonDO 5/28/2020 0915        Implants:  * No implants in log *      Drains: * No LDAs found *    Findings: Significant stenosis within the cubital tunnel and thickened transverse carpal ligament    Detailed Description of Procedure:   Patient was consented preoperatively brought into the operating room. He was placed on his anesthesia. Right upper extremity was sterile prepped and draped. I marked out an incision in line with the cubital tunnel ensuring an anterior loop over the medial epicondyle so as not to be directly over the nerve after decompress. Incision was extended past the medial epicondyle only approximately 1 to 1-1/2 inches to ensure clearance of the medial antebrachial cutaneous nerve. Incision was infiltrated with 1% lidocaine. 15 blade was used to perform the incision followed by Bovie cautery to take down the subcutaneous tissue. Sharp dissection using Metzenbaums and pickups was used to perform dissection all the way down to the cubital tunnel. Cubital tunnel fascia was identified and sharply incised identifying the nerve. I then stayed directly over the nerve and dissected proximally until the arcade of Sherrpiyushn Needs was identified and sharply divided. Complete proximal release of the nerve was performed as well as the cubital tunnel itself.   In similar fashion I

## 2020-05-29 LAB — SURGICAL PATHOLOGY REPORT: NORMAL

## 2020-06-02 NOTE — TELEPHONE ENCOUNTER
Patient had surgery with you on 05/28/20 states that he can't take the Keflex states that he has tried to take it with food and his stomach can't handle it. States that it causes stomach burn and upset is wondering what he should do. Please Advise.

## 2020-06-03 ENCOUNTER — TELEMEDICINE (OUTPATIENT)
Dept: DERMATOLOGY | Age: 50
End: 2020-06-03
Payer: MEDICARE

## 2020-06-03 PROCEDURE — 3017F COLORECTAL CA SCREEN DOC REV: CPT | Performed by: DERMATOLOGY

## 2020-06-03 PROCEDURE — 99213 OFFICE O/P EST LOW 20 MIN: CPT | Performed by: DERMATOLOGY

## 2020-06-03 PROCEDURE — G8428 CUR MEDS NOT DOCUMENT: HCPCS | Performed by: DERMATOLOGY

## 2020-06-03 RX ORDER — ISOTRETINOIN 40 MG/1
40 CAPSULE ORAL 2 TIMES DAILY
Qty: 60 CAPSULE | Refills: 0 | Status: SHIPPED | OUTPATIENT
Start: 2020-06-03 | End: 2020-07-01 | Stop reason: SDUPTHER

## 2020-06-03 NOTE — PROGRESS NOTES
Future Appointments   Date Time Provider Bryson Sheridan   6/15/2020  8:50 AM DO Annie Ordonez Neuro [facility]   6/30/2020  1:30 PM Phyllis Yanez DPM [facility]   7/1/2020  8:00 AM Joaquin Novak MD mh derm [facility]   7/22/2020  8:50 AM Karthikeyan Tamayo DO [facility]   9/8/2020  1:20 PM Ada Daniels MD Neuro Spec [address] [facility]

## 2020-06-03 NOTE — PROGRESS NOTES
TELEHEALTH EVALUATION -- Audio/Visual (During CZFUD-44 public health emergency)    Dermatology Patient Note  [id number] [phone]  [address] [id number]  Dept: [phone]  Dept Fax: [phone]      VISIT DATE: 6/3/2020   REFERRING PROVIDER: No ref. provider found      Derick Catalan is a 45 y.o. male  who presents today in the office for:    Acne      HISTORY OF PRESENT ILLNESS:  HPI Accutane Male Followup:    Gerhardt Seip is on Isotretinoin. He was seen today for his monthly follow-up evaluation. Current Isotretinoin Dose: 40 mg BID     Months of Completed Treatment: 1 (2400 mg)    Interim Course: Improving    Areas of Involvement: nose, cheeks    Associated Symptoms: Pustules/Boils    Current Skin Care Regimen: Using Lip Emollient    Side Effects from Isotretinoin: Dry Skin     Concerns for Mood Changes, Depression, Suicidal Thoughts: None    Laboratory Evaluation Review: Lab Results Not Available for Review at the Time of Visit      CURRENT MEDICATIONS:   Current Outpatient Medications   Medication Sig Dispense Refill    ISOtretinoin (ACCUTANE) 40 MG chemo capsule Take 1 capsule by mouth 2 times daily 60 capsule 0    oxyCODONE-acetaminophen (PERCOCET) 5-325 MG per tablet Take 1 tablet by mouth every 6 hours as needed for Pain for up to 7 days. Intended supply: 7 days.  Take lowest dose possible to manage pain 28 tablet 0    cephALEXin (KEFLEX) 500 MG capsule Take 1 capsule by mouth 2 times daily for 7 days 14 capsule 0    docusate sodium (COLACE) 100 MG capsule Take 1 capsule by mouth 2 times daily as needed for Constipation 60 capsule 0    BENZOYL PEROXIDE 5 % external wash WASH CHEST AND BACK 1 TO 2 TIMES DAILY 227 g 3    carbidopa-levodopa (SINEMET)  MG per tablet TAKE 1/2 TABLET BY MOUTH AT 8PM AND 11PM 30 tablet 3    atorvastatin (LIPITOR) 20 MG tablet Take 1 tablet by mouth nightly 30 tablet 5    ZINC OXIDE, TOPICAL, 10 % CREA Mix 1:1 with under the [facility] waiver authority and the "[employer] and [employer] Act, this Virtual  Visit was conducted, with patient's consent, to reduce the patient's risk of exposure to COVID-19 and provide continuity of care for an established patient. Services were provided through a video synchronous discussion virtually to substitute for in-person clinic visit.      Electronically signed by Jenelle Perdomo MD on 6/3/20 at 9:30 AM EDT

## 2020-06-05 ENCOUNTER — TELEPHONE (OUTPATIENT)
Dept: NEUROSURGERY | Age: 50
End: 2020-06-05

## 2020-06-08 ENCOUNTER — OFFICE VISIT (OUTPATIENT)
Dept: NEUROSURGERY | Age: 50
End: 2020-06-08

## 2020-06-08 VITALS
OXYGEN SATURATION: 95 % | TEMPERATURE: 96.6 F | DIASTOLIC BLOOD PRESSURE: 77 MMHG | WEIGHT: 273 LBS | SYSTOLIC BLOOD PRESSURE: 129 MMHG | BODY MASS INDEX: 36.18 KG/M2 | HEART RATE: 87 BPM | HEIGHT: 73 IN

## 2020-06-08 PROCEDURE — 99024 POSTOP FOLLOW-UP VISIT: CPT | Performed by: NEUROLOGICAL SURGERY

## 2020-06-08 NOTE — PROGRESS NOTES
No significant improvement in sensation of the right upper extremity. Did have some clear to bloody drainage from the suture sites on the right hand. Still with swelling and some achiness and pain around the elbow. On exam still with no sensation to the right upper extremity. Incision sites look good no apparent expressible drainage dehiscence or purulence evident. No significant erythema. Moderate tenderness to palpation over the cubital tunnel as well as the Guyon's region. We will plan for maintained follow-up in approximately 1 week to remove the rest of the sutures.

## 2020-06-09 RX ORDER — SULFAMETHOXAZOLE AND TRIMETHOPRIM 800; 160 MG/1; MG/1
1 TABLET ORAL 2 TIMES DAILY
Qty: 10 TABLET | Refills: 0 | Status: SHIPPED | OUTPATIENT
Start: 2020-06-09 | End: 2020-06-14

## 2020-06-09 NOTE — PROGRESS NOTES
Patient contacted the office, states that small area of incision has opened and cloudy fluid is present. Bactrim sent to the pharmacy prophylactically, will evaluate the patient in office tomorrow morning.

## 2020-06-10 ENCOUNTER — OFFICE VISIT (OUTPATIENT)
Dept: NEUROSURGERY | Age: 50
End: 2020-06-10

## 2020-06-10 VITALS
SYSTOLIC BLOOD PRESSURE: 93 MMHG | TEMPERATURE: 97.6 F | HEART RATE: 103 BPM | RESPIRATION RATE: 16 BRPM | DIASTOLIC BLOOD PRESSURE: 64 MMHG

## 2020-06-10 PROCEDURE — 99024 POSTOP FOLLOW-UP VISIT: CPT | Performed by: NURSE PRACTITIONER

## 2020-06-10 RX ORDER — SULFAMETHOXAZOLE AND TRIMETHOPRIM 800; 160 MG/1; MG/1
1 TABLET ORAL 2 TIMES DAILY
Qty: 18 TABLET | Refills: 0 | Status: SHIPPED | OUTPATIENT
Start: 2020-06-10 | End: 2020-06-19

## 2020-06-10 NOTE — PROGRESS NOTES
[address] [address] [address]  [facility] [address]  [address] [place] [id number]  Dept: [phone]    Patient:  Berna Castañeda  YOB: 1970  Date: 6/10/20    The patient is a 45 y.o. male who presents today for consult of the following problems:     Chief Complaint   Patient presents with    Post-Op Check     wound check         HPI:     Berna Castañeda is a 45 y.o. male who presents to the office for post-op evaluation s/p cubital tunnel, Guyon's canal, and carpal tunnel release to her right arm. Patient states that over the last day started to have increasing pressure, drainage from right palmar surgical site. Denies fevers or chills. Some swelling present, sutures taut. Was started on Bactrim yesterday, tolerating well. Sutures removed from hand per Dr. Siri Demarco. Wound irrigated with normal saline, lightly packed with quarter inch iodoform packing and covered with gauze dressing. Home care ordered to perform daily dressing changes. Intact sutures removed from cubital tunnel incision, well-healed, no erythema or drainage present. Will have patient return in approximately 2 weeks for reevaluation of site. Monitor for any worsening redness, tenderness, development of fevers or chills. Date of surgery: 5/28/2020    Assessment and Plan:      1. S/P carpal tunnel release    2. Non-healing surgical wound, subsequent encounter          Plan: Home care for daily packing and dressing changes to right hand. Monitor for worsening symptoms. Return to office in 2 weeks for recheck. Call with any concerns. Followup: Return in about 2 weeks (around 6/24/2020), or if symptoms worsen or fail to improve.     Prescriptions Ordered:  Orders Placed This Encounter   Medications    sulfamethoxazole-trimethoprim (BACTRIM DS;SEPTRA DS) 800-160 MG per tablet     Sig: Take 1 tablet by mouth 2 times daily for 9 days     Dispense:  18 tablet     Refill:  0        Orders

## 2020-06-18 ENCOUNTER — TELEPHONE (OUTPATIENT)
Dept: NEUROSURGERY | Age: 50
End: 2020-06-18

## 2020-06-24 ENCOUNTER — OFFICE VISIT (OUTPATIENT)
Dept: NEUROSURGERY | Age: 50
End: 2020-06-24

## 2020-06-24 VITALS
SYSTOLIC BLOOD PRESSURE: 116 MMHG | TEMPERATURE: 97.3 F | WEIGHT: 270 LBS | HEIGHT: 73 IN | DIASTOLIC BLOOD PRESSURE: 81 MMHG | HEART RATE: 85 BPM | OXYGEN SATURATION: 95 % | BODY MASS INDEX: 35.78 KG/M2

## 2020-06-24 PROCEDURE — 99024 POSTOP FOLLOW-UP VISIT: CPT | Performed by: NURSE PRACTITIONER

## 2020-06-24 RX ORDER — ASPIRIN 81 MG/1
1 TABLET, COATED ORAL DAILY
COMMUNITY
Start: 2020-06-19

## 2020-06-24 RX ORDER — LATANOPROST 50 UG/ML
1 SOLUTION/ DROPS OPHTHALMIC DAILY
COMMUNITY
Start: 2020-06-19

## 2020-06-29 ENCOUNTER — TELEPHONE (OUTPATIENT)
Dept: NEUROSURGERY | Age: 50
End: 2020-06-29

## 2020-06-29 NOTE — TELEPHONE ENCOUNTER
If the wound has healed enough that there is no room to insert packing, it is ok to stop. Keep incision site clean, can cover with gauze as needed.

## 2020-06-30 ENCOUNTER — OFFICE VISIT (OUTPATIENT)
Dept: PODIATRY | Age: 50
End: 2020-06-30
Payer: MEDICARE

## 2020-06-30 VITALS — HEIGHT: 73 IN | WEIGHT: 270 LBS | TEMPERATURE: 97.2 F | RESPIRATION RATE: 16 BRPM | BODY MASS INDEX: 35.78 KG/M2

## 2020-06-30 PROCEDURE — 99999 PR OFFICE/OUTPT VISIT,PROCEDURE ONLY: CPT | Performed by: PODIATRIST

## 2020-06-30 PROCEDURE — 11721 DEBRIDE NAIL 6 OR MORE: CPT | Performed by: PODIATRIST

## 2020-06-30 PROCEDURE — 17110 DESTRUCTION B9 LES UP TO 14: CPT | Performed by: PODIATRIST

## 2020-06-30 NOTE — PROGRESS NOTES
USE TO TEST BLOOD SUGAR 4 TIMES A DAY 1 each 3    TRUE [employer] BLOOD GLUCOSE TEST strip TEST BLOOD SUGAR 3 TO 4 TIMES DAILY 100 each 1    PHARMACIST CHOICE ALCOHOL 70 % PADS USE BEFORE TESTING SUGAR (4 TIMES) PLUS BEFORE USING INSULIN (5 TIMES A DAY) 270 each 11     No current facility-administered medications on file prior to visit. Review of Systems    Review of Systems:   History obtained from chart review and the patient  General ROS: negative for - chills, fatigue, fever, night sweats or weight gain  Constitutional: Negative for chills, diaphoresis, fatigue, fever and unexpected weight change. Musculoskeletal: Positive for arthralgias, gait problem and joint swelling. Neurological ROS: negative for - behavioral changes, confusion, headaches or seizures. Negative for weakness and numbness. Dermatological ROS: negative for - mole changes, rash  Cardiovascular: Negative for leg swelling. Gastrointestinal: Negative for constipation, diarrhea, nausea and vomiting. Objective:  Dermatologic Exam:  Skin lesion present to the right and left plantar foot with a central core and petchaie noted to the lesions periphery.   Pain on palpation of the lesion    Skin is thin, with flaky sloughing skin as well as decreased hair growth to the lower leg  Small red hemosiderin deposits seen dorsal foot   Musculoskeletal:     1st MPJ ROM decreased, Bilateral.  Muscle strength 5/5, Bilateral.  Pain present upon palpation of toenails 1-5, Bilateral. decreased medial longitudinal arch, Bilateral.  Ankle ROM decreased,Bilateral.    Dorsally contracted digits present digits 2, Bilateral.     Vascular: DP pulses 1/4 bilateral.  PT pulses 0/4 bilateral.   CFT <5 seconds, Bilateral.  Hair growth absent to the level of the digits, Bilateral.  Edema present, Bilateral.  Varicosities absent, Bilateral. Erythema absent, Bilateral    Neurological: Sensation diminshed to light touch to level of digits, Bilateral.  Protective sensation intact 6/10 sites via 5.07/10g [place] Monofilament, Bilateral.  negative Tinel's, Bilateral.  negative Valleix sign, Bilateral.      Integument: Warm, dry, supple, Bilateral.  Open lesion absent, Bilateral.  Interdigital maceration absent to web spaces 4, Bilateral.  Nails 1-5 left and 1-5 right thickened > 3.0 mm, dystrophic and crumbly, discolored with subungual debris. Fissures absent, Bilateral.   General: AAO x 3 in NAD. Derm  Toenail Description  Sites of Onychomycosis Involvement (Check affected area)  [x] [x] [x] [x] [x] [x] [x] [x] [x] [x]  5 4 3 2 1 1 2 3 4 5                          Right                                        Left    Thickness  [x] [x] [x] [x] [x] [x] [x] [x] [x] [x]  5 4 3 2 1 1 2 3 4 5                         Right                                        Left    Dystrophic Changes   [x] [x] [x] [x] [x] [x] [x] [x] [x] [x]  5 4 3 2 1 1 2 3 4 5                         Right                                        Left    Color   [x] [x] [x] [x] [x] [x] [x] [x] [x] [x]  5 4 3 2 1 1 2 3 4 5                          Right                                        Left    Incurvation/Ingrowin   [] [] [] [] [] [] [] [] [] []  5 4 3 2 1 1 2 3 4 5                         Right                                        Left    Inflammation/Pain   [x] [x] [x] [x] [x] [x] [x] [x] [x] [x]  5 4 3 2 1 1 2 3 4 5                         Right                                        Left        Visual inspection:  Deformity: hammertoe deformity digna feet  amputation: absent  Skin lesions: present - as above  Edema: right- 2+ pitting edema, left- 2+ pitting edema    Sensory exam:  Monofilament sensation: abnormal - 6/10 via SW 5.07/10g monofilament to the plantar foot bilateral feet    Pulses: abnormal - 1/4 dorsalis pedis pulse and 1/4 Posterior tibial pulse,   Pinprick: Impaired  Proprioception: Impaired  Vibration (128 Hz):  Impaired       DM with PVD       [x]Yes []No      Assessment:  45 y.o. male with:   Diagnosis Orders   1. Type II diabetes mellitus with peripheral circulatory disorder (HCC)  HM DIABETES FOOT EXAM    91191 - [place] DEBRIDEMENT OF NAILS, 6 OR MORE    77258 - [place] DESTRUCTION BENIGN LESIONS UP TO 14   2. Dermatophytosis of nail  HM DIABETES FOOT EXAM    28456 - [place] DEBRIDEMENT OF NAILS, 6 OR MORE   3. Bilateral foot pain  HM DIABETES FOOT EXAM    62203 - [place] DEBRIDEMENT OF NAILS, 6 OR MORE    06012 - [place] DESTRUCTION BENIGN LESIONS UP TO 14   4. Peripheral vascular disorder (HCC)  HM DIABETES FOOT EXAM    51706 - [place] DEBRIDEMENT OF NAILS, 6 OR MORE    92043 - [place] DESTRUCTION BENIGN LESIONS UP TO 14   5. Xerosis of skin  HM DIABETES FOOT EXAM    66216 - [place] DEBRIDEMENT OF NAILS, 6 OR MORE   6. Benign neoplasm of skin of lower limb, including hip, right  HM DIABETES FOOT EXAM    80064 - [place] DESTRUCTION BENIGN LESIONS UP TO 14   7. Benign neoplasm of skin of lower limb, including hip, left  HM DIABETES FOOT EXAM    38715 - [place] DESTRUCTION BENIGN LESIONS UP TO 14             Q7   []Yes    []No                Q8   [x]Yes    []No                     Q9   []Yes    []No    Plan:   Pt was evaluated and examined. Patient was given personalized discharge instructions. The lesion was partially excised and Pyrogallic acid was applied under occlusion. The patient will leave in place for 24-48 hours and than remove. The patient tolerated the procedure well and without complication. Nails 1-10 were debrided sharply in length and thickness with a nipper and , without incident. Pt will follow up in 9 weeks or sooner if any problems arise. Diagnosis was discussed with the pt and all of their questions were answered in detail. Proper foot hygiene and care was discussed with the pt. Informed patient on proper diabetic foot care and importance of tight glycemic control. Patient to check feet daily and contact the office with any questions/problems/concerns.    Other comorbidity noted and will be managed by PCP.  6/30/2020    Electronically signed by Ritesh Fung DPM on 6/30/2020 at 1:34 PM  6/30/2020

## 2020-07-01 ENCOUNTER — TELEMEDICINE (OUTPATIENT)
Dept: DERMATOLOGY | Age: 50
End: 2020-07-01
Payer: MEDICARE

## 2020-07-01 PROCEDURE — 3017F COLORECTAL CA SCREEN DOC REV: CPT | Performed by: DERMATOLOGY

## 2020-07-01 PROCEDURE — 99213 OFFICE O/P EST LOW 20 MIN: CPT | Performed by: DERMATOLOGY

## 2020-07-01 PROCEDURE — G8428 CUR MEDS NOT DOCUMENT: HCPCS | Performed by: DERMATOLOGY

## 2020-07-01 RX ORDER — ISOTRETINOIN 40 MG/1
40 CAPSULE ORAL 2 TIMES DAILY
Qty: 60 CAPSULE | Refills: 0 | Status: SHIPPED | OUTPATIENT
Start: 2020-07-01 | End: 2020-07-31

## 2020-07-15 RX ORDER — INSULIN LISPRO 100 [IU]/ML
INJECTION, SOLUTION INTRAVENOUS; SUBCUTANEOUS
Qty: 15 ML | OUTPATIENT
Start: 2020-07-15

## 2020-07-15 NOTE — TELEPHONE ENCOUNTER
I have not seen this patient for 6 months and the last visit he informed me that he was being seen by endocrinology.     Please call patient

## 2020-07-16 NOTE — TELEPHONE ENCOUNTER
PATIENT IS SEEING ,HE WILL NOTIFY THE PHARMACY    Health Maintenance   Topic Date Due    Diabetic retinal exam  02/16/1979    HIV screen  02/16/1984    Hepatitis B vaccine (1 of 3 - Risk 3-dose series) 02/16/1988    Colon cancer screen colonoscopy  02/16/2019    Shingles Vaccine (1 of 2) 08/14/2020 (Originally 2/16/2019)    Flu vaccine (1) 09/01/2020    Creatinine monitoring  02/10/2021    A1C test (Diabetic or Prediabetic)  02/12/2021    Diabetic microalbuminuria test  02/12/2021    Lipid screen  02/12/2021    Potassium monitoring  05/28/2021    Diabetic foot exam  07/06/2021    DTaP/Tdap/Td vaccine (2 - Td) 01/16/2029    Pneumococcal 0-64 years Vaccine  Completed    Hepatitis A vaccine  Aged Out    Hib vaccine  Aged Out    Meningococcal (ACWY) vaccine  Aged Out             (applicable per patient's age: Cancer Screenings, Depression Screening, Fall Risk Screening, Immunizations)    Hemoglobin A1C (%)   Date Value   02/12/2020 5.4   11/15/2019 5.5   08/14/2019 5.3     Microalb/Crt.  Ratio (mcg/mg creat)   Date Value   02/12/2020 23 (H)     LDL Cholesterol (mg/dL)   Date Value   02/12/2020 88     LDL Calculated (mg/dL)   Date Value   11/19/2019 164 (A)     AST (U/L)   Date Value   11/19/2019 26     ALT (U/L)   Date Value   11/19/2019 25     BUN (mg/dL)   Date Value   02/10/2020 14      (goal A1C is < 7)   (goal LDL is <100) need 30-50% reduction from baseline     BP Readings from Last 3 Encounters:   06/24/20 116/81   06/10/20 93/64   06/08/20 129/77    (goal /80)      All Future Testing planned in [facility]:  Lab Frequency Next Occurrence   PT eval and treat Once 09/05/2019   Microalbumin, Ur Once 11/15/2019   AST Once 06/03/2020   ALT Once 06/03/2020   Triglyceride Once 06/03/2020       Next Visit Date:  Future Appointments   Date Time Provider Bryson Swartz   7/22/2020  8:50 AM DO Annie Solis Neuro [facility]   9/1/2020  1:30 PM ROBERT Barroso Podiat[facility] 9/8/2020  1:20 PM Mirza Tavera MD Neuro Spec [facility]            Patient Active Problem List:     Diabetes mellitus ([facility] [address]     Hyperlipidemia     Hypertension     Lumbar radiculopathy     Obstructive sleep apnea syndrome     Restless legs syndrome (RLS)     Severe obesity (BMI 35.0-39. 9) with comorbidity (HCC)     Numbness and tingling in both hands     Neuropathic pain of both legs     Gait difficulty     Diabetic polyneuropathy associated with type 2 diabetes mellitus (HCC)     Muscle spasms of both lower extremities     Ulnar neuropathy of right upper extremity     Right median nerve neuropathy     Mass of right elbow

## 2020-07-22 ENCOUNTER — OFFICE VISIT (OUTPATIENT)
Dept: NEUROSURGERY | Age: 50
End: 2020-07-22
Payer: MEDICARE

## 2020-07-22 VITALS
OXYGEN SATURATION: 91 % | WEIGHT: 265 LBS | HEIGHT: 73 IN | TEMPERATURE: 98 F | SYSTOLIC BLOOD PRESSURE: 121 MMHG | HEART RATE: 68 BPM | BODY MASS INDEX: 35.12 KG/M2 | DIASTOLIC BLOOD PRESSURE: 89 MMHG

## 2020-07-22 PROCEDURE — 3017F COLORECTAL CA SCREEN DOC REV: CPT | Performed by: NEUROLOGICAL SURGERY

## 2020-07-22 PROCEDURE — G8417 CALC BMI ABV UP PARAM F/U: HCPCS | Performed by: NEUROLOGICAL SURGERY

## 2020-07-22 PROCEDURE — 1036F TOBACCO NON-USER: CPT | Performed by: NEUROLOGICAL SURGERY

## 2020-07-22 PROCEDURE — 99213 OFFICE O/P EST LOW 20 MIN: CPT | Performed by: NEUROLOGICAL SURGERY

## 2020-07-22 PROCEDURE — G8427 DOCREV CUR MEDS BY ELIG CLIN: HCPCS | Performed by: NEUROLOGICAL SURGERY

## 2020-07-22 NOTE — PROGRESS NOTES
solution Place 1 drop into both eyes daily      BENZOYL PEROXIDE 5 % external wash WASH CHEST AND BACK 1 TO 2 TIMES DAILY 227 g 3    carbidopa-levodopa (SINEMET)  MG per tablet TAKE 1/2 TABLET BY MOUTH AT 8PM AND 11PM 30 tablet 3    atorvastatin (LIPITOR) 20 MG tablet Take 1 tablet by mouth nightly 30 tablet 5    ZINC OXIDE, TOPICAL, 10 % CREA Mix 1:1 with ketoconazole and apply to groin creases nightly 50 g 2    ketoconazole (NIZORAL) 2 % cream Mix 1:1 with zinc oxide and apply to groin creases nightly 60 g 2    tiZANidine (ZANAFLEX) 4 MG tablet Take one tab nighly 30 tablet 3    amitriptyline (ELAVIL) 25 MG tablet Take 2 tab po qhs 60 tablet 3    Misc. Devices MISC 1 PAIR OF DIABETIC SHOES (1 LEFT/ 1 RIGHT)  1-3 PAIRS OF INSERTS (LEFT/ RIGHT) 2 each 0    vitamin D (ERGOCALCIFEROL) 1.25 MG (65967 UT) CAPS capsule Take 1 capsule by mouth once a week      GLUCAGON EMERGENCY 1 MG injection       isosorbide mononitrate (IMDUR) 30 MG extended release tablet Take 1 tablet by mouth daily      HYDRALAZINE HCL PO Take 50 mg by mouth daily      Alcohol Swabs (B-D SINGLE USE SWABS REGULAR) PADS USE BEFORE TESTING 4 TIMES A DAY PLUS BEFORE USING INSULIN (5 TIMES A DAY) 365 each 2    TRUEplus Lancets 33G MISC USE TO TEST BLOOD SUGAR FOUR TIMES DAILY 100 each 11    lisinopril (PRINIVIL;ZESTRIL) 5 MG tablet Take 5 mg by mouth nightly       metoprolol succinate (TOPROL XL) 25 MG extended release tablet Take 25 mg by mouth daily      nystatin (MYCOSTATIN) 447657 UNIT/GM cream Apply topically 2 times daily.  15 g 3    Semaglutide (OZEMPIC, 1 MG/DOSE, SC) Inject 1 mg into the skin once a week Every Sunday      Sulfacetamide Sodium 9.8 % LOTN Apply to nose and cheeks twice daily (Patient taking differently: Apply topically as needed Apply to nose and cheeks twice daily) 113 g 3    omeprazole (PRILOSEC) 20 MG delayed release capsule Take 1 capsule by mouth every morning (before breakfast) 90 capsule 3    blood intact  3 item recall intact  Judgement intact to situation    Cranial Nerves:   Pupils equal and reactive to light  Extraocular motion intact  Face and shrug symmetric  Tongue midline  No dysarthria  v1-3 sensation symmetric, masseter tone symmetric  Hearing symmetric and intact to finger rub    Sensation:   Sensation bilateral distribution as well as partial median distribution. Positive Tinel's at the elbow on the left side. Diminished in station dorsum. Motor  L deltoid 5/5; R deltoid 5/5  L biceps 5/5; R biceps 5/5  L triceps 5/5; R triceps 5/5  L wrist extension 5/5; R wrist extension 5/5  L intrinsics 5/5; R intrinsics 5/5     L iliopsoas 5/5 , R iliopsoas 5/5  L quadriceps 5/5; R quadriceps 5/5  L Dorsiflexion 5/5; R dorsiflexion 5/5  L Plantarflexion 5/5; R plantarflexion 5/5  L EHL 5/5; R EHL 5/5    Reflexes  L Brachioradialis 2+/4; R brachioradialis 2+/4  L Biceps 2+/4; R Biceps 2+/4  L Triceps 2+/4; R Triceps 2+/4  L Patellar 2+/4: R Patellar 2+/4  L Achilles 2+/4; R Achilles 2+/4    hoffmans L: neg  hoffmans R: neg  Clonus L: neg  Clonus R: neg  Babinski L: up  Babinski R; up    Negative [place]. Negative Froment's. Studies Review:     MRI L degenerative disease with desiccation L5-S1 with collapse type. Mild foraminal stenosis bilaterally but no significant central stenosis. Assessment and Plan:      No diagnosis found. Plan: We will plan for left-sided treatment of the ulnar neuropathy median neuropathy in the winter per the patient's own request.    [place]s at the L5-S1 level to better delineate what is it is discogenic symptoms. Followup: No follow-ups on file. Prescriptions Ordered:  No orders of the defined types were placed in this encounter. Orders Placed:  No orders of the defined types were placed in this encounter.        Electronically signed by Michael Ulloa DO on 7/22/2020 at 10:37 AM    Please note that this chart was generated using voice recognition

## 2020-07-30 RX ORDER — BACITRACIN 500 [USP'U]/G
OINTMENT TOPICAL
Qty: 60 G | Refills: 3 | Status: SHIPPED | OUTPATIENT
Start: 2020-07-30

## 2020-07-30 RX ORDER — KETOCONAZOLE 20 MG/G
CREAM TOPICAL
Qty: 60 G | Refills: 2 | Status: SHIPPED | OUTPATIENT
Start: 2020-07-30

## 2020-07-30 NOTE — TELEPHONE ENCOUNTER
Beatriz De Leon is requesting a refill on the following medications:   Requested Prescriptions     Pending Prescriptions Disp Refills    zinc oxide 20 % ointment [Pharmacy Med Name: ZINC OXIDE 20% TOPICAL OINTMENT] 60 g      Sig: MIX WITH KETOCONAZOLE 1:1 AND APPLY TO GROIN CREASES NIGHTLY    ketoconazole (NIZORAL) 2 % cream [Pharmacy Med Name: KETOCONAZOLE 2% TOPICAL CREAM] 60 g 2     Si [facility] 1:1 AND APPLY TO GROIN CREASES NIGHTLY       Last OV 20    Future Appointments   Date Time Provider Bryson Swartz   2020  1:30 PM ROBERT Durbin Podiatry [facility]   2020  1:40 PM DO Annie Ridley Neuro [facility]

## 2020-08-03 NOTE — PROGRESS NOTES
of amitriptyline is making him lethargic. He is taking 50 mg every night. He was borderline diabetic for > 3 years. He is on multiple diabetic medications including insulin, Victoza, etc.  His blood sugars had been under control but his symptoms had been getting worse. He has had NCS/EMG testing of lower extremities and he was told to have neuropathy. He was on gabapentin 300 mg 3 times daily and it has caused increased sleepiness and he stopped taking it. He also tried Lyrica and Cymbalta with no help. He has been having stinging sensation along with tingling and numbness in lower legs and feet. He also has been having painful sensations in both hands and those are described as clue-like sensation in medial aspect of both forearms and last 2 digits. He has been having trouble walking with increased pain upon walking. He denies radiating pain and paresthesias across the lower back. Denies bladder and bowel incontinence. He has been having extreme difficulty walking with unsteady gait with progressive pain in bilateral lower extremities. Patient states he is getting a compound cream from [employer] (gabapentin 10%, ketoprofen 10%, lidocaine 2%, prilocaine 2% solution) to be applied 2-5 drops to area of pain up to 4 times a day prn    Review of systems done and pertinent positives include numbness, weakness, spasms, pain in both legs, unsteady gait, back pain and restless legs as stated above.     Current Outpatient Medications on File Prior to Visit   Medication Sig Dispense Refill    zinc oxide 20 % ointment MIX WITH KETOCONAZOLE 1:1 AND APPLY TO GROIN CREASES NIGHTLY 60 g 3    ketoconazole (NIZORAL) 2 % cream MIX WITH ZINC OXIDE 1:1 AND APPLY TO GROIN CREASES NIGHTLY 60 g 2    ASPIRIN LOW DOSE 81 MG EC tablet Take 1 tablet by mouth daily      latanoprost (XALATAN) 0.005 % ophthalmic solution Place 1 drop into both eyes daily      BENZOYL PEROXIDE 5 % external wash WASH CHEST AND BACK 1 TO 2 TIMES DAILY 227 g 3    carbidopa-levodopa (SINEMET)  MG per tablet TAKE 1/2 TABLET BY MOUTH AT 8PM AND 11PM 30 tablet 3    atorvastatin (LIPITOR) 20 MG tablet Take 1 tablet by mouth nightly 30 tablet 5    tiZANidine (ZANAFLEX) 4 MG tablet Take one tab nighly 30 tablet 3    amitriptyline (ELAVIL) 25 MG tablet Take 2 tab po qhs 60 tablet 3    Misc. Devices MISC 1 PAIR OF DIABETIC SHOES (1 LEFT/ 1 RIGHT)  1-3 PAIRS OF INSERTS (LEFT/ RIGHT) 2 each 0    vitamin D (ERGOCALCIFEROL) 1.25 MG (38061 UT) CAPS capsule Take 1 capsule by mouth once a week      GLUCAGON EMERGENCY 1 MG injection       isosorbide mononitrate (IMDUR) 30 MG extended release tablet Take 1 tablet by mouth daily      HYDRALAZINE HCL PO Take 50 mg by mouth daily      Alcohol Swabs (B-D SINGLE USE SWABS REGULAR) PADS USE BEFORE TESTING 4 TIMES A DAY PLUS BEFORE USING INSULIN (5 TIMES A DAY) 365 each 2    TRUEplus Lancets 33G MISC USE TO TEST BLOOD SUGAR FOUR TIMES DAILY 100 each 11    lisinopril (PRINIVIL;ZESTRIL) 5 MG tablet Take 5 mg by mouth nightly       metoprolol succinate (TOPROL XL) 25 MG extended release tablet Take 25 mg by mouth daily      nystatin (MYCOSTATIN) 796232 UNIT/GM cream Apply topically 2 times daily. 15 g 3    Semaglutide (OZEMPIC, 1 MG/DOSE, SC) Inject 1 mg into the skin once a week Every Sunday      Sulfacetamide Sodium 9.8 % LOTN Apply to nose and cheeks twice daily (Patient taking differently: Apply topically as needed Apply to nose and cheeks twice daily) 113 g 3    omeprazole (PRILOSEC) 20 MG delayed release capsule Take 1 capsule by mouth every morning (before breakfast) 90 capsule 3    blood glucose monitor strips Test 4 times a day & as needed for symptoms of irregular blood glucose.  120 strip 3    insulin lispro (HUMALOG KWIKPEN) 100 UNIT/ML pen TIDbefore meals 141-180 6units,181-220 8units,221-260 10units,261-300 12units,301-350 14units,351-400 16 units,>400 18units [id number] daily (Patient taking differently: Inject into the skin 4 times daily (after meals and at bedtime) TIDbefore meals 141-180 6units,181-220 8units,221-260 10units,261-300 12units,301-350 14units,351-400 16 units,>400 18units [id number] daily) 90 mL 0    insulin glargine (BASAGLAR KWIKPEN) 100 UNIT/ML injection pen Inject 26 Units into the skin nightly (Patient taking differently: Inject into the skin 2 times daily 10 units in AM 10 Units bed time) 60 mL 0    Insulin Pen Needle (COMFORT EZ PEN NEEDLES) 32G X 4 MM MISC Use 5 times daily. Change needle each time with Victoza, Basaglar, and Humalog 100 each 11    ammonium lactate (AMLACTIN) 12 % cream APPLY TOPICALLY AS NEEDED. (Patient taking differently: Apply topically as needed Apply topically as needed.) 280 g 2    Lancet Devices (SIMPLE DIAGNOSTICS LANCING DEV) MISC USE TO TEST BLOOD SUGAR 4 TIMES A DAY 1 each 3    TRUE METRIX BLOOD GLUCOSE TEST strip TEST BLOOD SUGAR 3 TO 4 TIMES DAILY 100 each 1    PHARMACIST CHOICE ALCOHOL 70 % PADS USE BEFORE TESTING SUGAR (4 TIMES) PLUS BEFORE USING INSULIN (5 TIMES A DAY) 270 each 11     No current facility-administered medications on file prior to visit. Allergies: Camron Mcgovern is allergic to keflex [cephalexin]; codeine; corticosteroids; doxycycline; ibuprofen; and metformin and related.     Past Medical History:   Diagnosis Date    Bundle branch block, left     [facility]; last visit (virtual) April 2020    Diabetes [facility])     GERD (gastroesophageal reflux disease)     History of echocardiogram 2016    [facility]; [facility]    Hyperlipidemia     Hypertension     PCP COLE Ponce NP, seen on 1/5/2020    Kidney calculi     Lumbar radiculopathy 6/9/2017    Non-ischemic cardiomyopathy [facility] [address]     Obstructive sleep apnea syndrome 06/09/2017    No machine    PONV (postoperative nausea and vomiting)     needs scop patch for OR    Prolonged emergence from general anesthesia     Restless legs syndrome 6/9/2017    Severe obesity (BMI 35.0-39. 9) with comorbidity ([address] 7/31/2018    Wears dentures     Upper & lower       Past Surgical History:   Procedure Laterality Date    ANKLE SURGERY Right     ORIF    CARDIAC CATHETERIZATION  01/2020    \"at a [facility]\"; No blockages    CARPAL TUNNEL RELEASE Right 05/28/2020    CUBITAL, GUYONS CANAL AND CARPAL TUNNEL RELEASE     CARPAL TUNNEL RELEASE Right 5/28/2020    CUBITAL, GUYONS CANAL AND CARPAL TUNNEL RELEASE performed by Navin Gil DO at [facility]    No polyps    GLAUCOMA SURGERY Bilateral 12/2018    HC INJECTION PROCEDURE FOR SACROILIAC JOINT Bilateral 2/21/2020    SACROILIAC JOINT INJECTION [facility]ang [facility]aga [facility] at [facility] Right     SINUS SURGERY       Social History: Tamanna Toussaint  reports that he quit smoking about 10 years ago. His smoking use included cigarettes. He started smoking about 32 years ago. He has a 60.00 pack-year smoking history. He has never used smokeless tobacco. He reports current alcohol use. He reports previous drug use. Family History   Problem Relation Age of Onset    High Cholesterol Mother     Depression Mother     Heart Disease Father         stents    High Cholesterol Father     Alcohol Abuse Father     Heart Surgery Father         CABG    Heart Attack Father     Lung Cancer Father         primary, transferred to the bones    Dementia Father     Diabetes Maternal Grandmother     Diabetes Maternal Grandfather     No Known Problems Paternal Grandmother     No Known Problems Paternal Grandfather     No Known Problems Half-Sister     No Known Problems Half-Brother     Thyroid Disease Daughter      On exam: vitals: Checked recently; 120/80; 86/min; afebrile.     NEUROLOGIC EXAMINATION  GENERAL  Appears comfortable and in no distress   HEENT  NC/ AT   NECK  Supple    MENTAL STATUS:  Alert, oriented, intact memory, no confusion, normal speech, normal language, no hallucination or delusion   CRANIAL NERVES: II     -      PERRLA, Visual fields grossly full. III,IV,VI -  EOMs full, no KRISTINE, no ptosis  V     -     Unable to perform  VII    -     Normal facial symmetry  VIII   -     Intact hearing  IX,X -     unable to perform  XI    -     Symmetrical shoulder shrug  XII   -     Midline tongue, no atrophy    MOTOR FUNCTION:  significant for no visible weakness in bilateral upper and lower extremities with normal bulk and no involuntary movements, no tremor   SENSORY FUNCTION:  Unable to perform   CEREBELLAR FUNCTION:  Intact fine motor control over upper limbs   REFLEX FUNCTION:  Unable to perform   STATION and GAIT  Normal station, wide-based gait     NCS/EMG of bilateral upper and lower extremities 5/29/18:    Performed by Dr. Silviano Rodarte:   Abnormal study; electrophysiologic evidence of mild bilateral carpal tunnel syndrome.        EMG of Bilateral UE (4/4/19):   Electrodiagnostic Interpretation:   There is electrophysiologic evidence of ulnar neuropathy at right elbow with demyelinating and axonal features. Supporting features include low ulnar snap, prolonged ulnar cmap with slowing of greater than 10-11 m/s across the elbow segment compared with the forearm segment. This study also demonstrated electrophysiologic evidence of bilateral median neuropathy (bilateral CTS)  of mild-moderate severity.      MRI lumbar spine 10/29/2019: Degenerative disc disease at L5-S1 with disc bulge superimposed central to left paracentral disc protrusion, with narrowing of the left lateral recess, possibly contacting left descending S1 nerve root, with mild bilateral foraminal narrowing.         Lab Results   Component Value Date    TSH 0.98 02/12/2020       No results found for: RPR   No components found for: [facility]  Lab Results   Component Value Date    LABA1C 5.4 02/12/2020         Impression and Plan: Mr. Gemma Kaminski is a 45 y.o. male with   Entrapment neuropathy; right ulnar neuropathy & bilateral CTS; s/p Rt cubital, Guyons canal and carpal tunnel release on 5/28/2020; hand got infected; finally healed; \"things are some better\" \"all the feeling of 4th, 5th digits didn't come back\". Discussed about referral to [facility] or [facility]; he doesn't want any referral.     Dysesthesias and cramps in bilateral lower extremities with severe diabetic peripheral polyneuropathy marginal relief with amitriptyline 50 mg nightly; couldn't tolerate any higher dose of amitriptyline; will continue the same dose; also to send a script for Tizanidine 4mg qhs. He is also using compounded cream from [facility] as per pain clinic. Failed PT; abnormal lumbosacral spine MRI; being evaluated by neurosurgeon Dr. Bandar Moran.      Restless leg syndrome sec to diabetic peripheral polyneuropathy; periodic leg movements in sleep; well controlled on low-dose of Sinemet; to continue same at half tab Sinemet 25/100 bid at 8 pm- 11 pm     Meds tried for neuropathic pain relief:  Failed gabapentin, Lyrica and duloxetine; couldn't tolerate higher doses of Elavil    Follow-up in 4 months      This is a telehealth visit that was performed with the originating site at Patient Location: Patient Home and Provider Location of [place], [place]. Patient ID verified by me prior to start of this visit. Verbal consent to participate in video visit was obtained. Pursuant to the emergency declaration under the [facility], [facility] waiver authority and the [employer] Act, this Virtual Visit was conducted, with patient's consent, to reduce the patient's risk of exposure to COVID-19 and provide continuity of care for an established/new patient. Complete and detailed physical examination is not feasible during this virtual video visit and patient is agreeable and understood.  Services were provided through a video synchronous discussion virtually to substitute for in-person clinic visit. I discussed with the patient the nature of our telehealth visits via interactive/real-time audio/video that:  - I would evaluate the patient and recommend diagnostics and treatments based on my assessment  - Our sessions are not being recorded and that personal health information is protected  - Our team would provide follow up care in person if/when the patient needs it.

## 2020-08-04 ENCOUNTER — TELEMEDICINE (OUTPATIENT)
Dept: NEUROLOGY | Age: 50
End: 2020-08-04
Payer: MEDICARE

## 2020-08-04 PROCEDURE — 3044F HG A1C LEVEL LT 7.0%: CPT | Performed by: PSYCHIATRY & NEUROLOGY

## 2020-08-04 PROCEDURE — 3017F COLORECTAL CA SCREEN DOC REV: CPT | Performed by: PSYCHIATRY & NEUROLOGY

## 2020-08-04 PROCEDURE — 2022F DILAT RTA XM EVC RTNOPTHY: CPT | Performed by: PSYCHIATRY & NEUROLOGY

## 2020-08-04 PROCEDURE — 99214 OFFICE O/P EST MOD 30 MIN: CPT | Performed by: PSYCHIATRY & NEUROLOGY

## 2020-08-04 PROCEDURE — G8428 CUR MEDS NOT DOCUMENT: HCPCS | Performed by: PSYCHIATRY & NEUROLOGY

## 2020-08-04 RX ORDER — TIZANIDINE 4 MG/1
TABLET ORAL
Qty: 30 TABLET | Refills: 6 | Status: SHIPPED | OUTPATIENT
Start: 2020-08-04 | End: 2021-02-23

## 2020-08-04 RX ORDER — AMITRIPTYLINE HYDROCHLORIDE 25 MG/1
TABLET, FILM COATED ORAL
Qty: 60 TABLET | Refills: 3 | Status: SHIPPED | OUTPATIENT
Start: 2020-08-04 | End: 2020-11-19

## 2020-08-04 NOTE — LETTER
[facility]  [address] [place] [id number]  Phone: [phone]  Fax: [phone]    Slade Burks MD        August 4, 2020     Patient: Kathryn Diego   YOB: 1970   Date of Visit: 8/4/2020       To Whom It May Concern: It is my medical opinion that Amor Silva requires a disability parking placard for the following reasons:  He has limited walking ability due to a neurologic condition. Duration of need: 1 year    If you have any questions or concerns, please don't hesitate to call.     Sincerely,        Slade Burks MD

## 2020-08-06 ENCOUNTER — TELEPHONE (OUTPATIENT)
Dept: FAMILY MEDICINE CLINIC | Age: 50
End: 2020-08-06

## 2020-08-10 DIAGNOSIS — G47.30 SLEEP APNEA, UNSPECIFIED TYPE: Primary | ICD-10-CM

## 2020-09-01 ENCOUNTER — OFFICE VISIT (OUTPATIENT)
Dept: PODIATRY | Age: 50
End: 2020-09-01
Payer: MEDICARE

## 2020-09-01 VITALS — HEIGHT: 73 IN | BODY MASS INDEX: 35.12 KG/M2 | TEMPERATURE: 97.1 F | WEIGHT: 265 LBS | RESPIRATION RATE: 18 BRPM

## 2020-09-01 PROCEDURE — 3017F COLORECTAL CA SCREEN DOC REV: CPT | Performed by: PODIATRIST

## 2020-09-01 PROCEDURE — 1036F TOBACCO NON-USER: CPT | Performed by: PODIATRIST

## 2020-09-01 PROCEDURE — 3044F HG A1C LEVEL LT 7.0%: CPT | Performed by: PODIATRIST

## 2020-09-01 PROCEDURE — 99213 OFFICE O/P EST LOW 20 MIN: CPT | Performed by: PODIATRIST

## 2020-09-01 PROCEDURE — G8427 DOCREV CUR MEDS BY ELIG CLIN: HCPCS | Performed by: PODIATRIST

## 2020-09-01 PROCEDURE — G8417 CALC BMI ABV UP PARAM F/U: HCPCS | Performed by: PODIATRIST

## 2020-09-01 PROCEDURE — 11721 DEBRIDE NAIL 6 OR MORE: CPT | Performed by: PODIATRIST

## 2020-09-01 PROCEDURE — 17110 DESTRUCTION B9 LES UP TO 14: CPT | Performed by: PODIATRIST

## 2020-09-01 PROCEDURE — 2022F DILAT RTA XM EVC RTNOPTHY: CPT | Performed by: PODIATRIST

## 2020-09-01 RX ORDER — CLOTRIMAZOLE AND BETAMETHASONE DIPROPIONATE 10; .64 MG/G; MG/G
CREAM TOPICAL
Qty: 45 G | Refills: 2 | Status: SHIPPED | OUTPATIENT
Start: 2020-09-01 | End: 2020-11-09

## 2020-09-01 NOTE — PROGRESS NOTES
[facility]  [facility]  [address] [address]  Dept: [phone]  Dept Fax: [phone]    DIABETIC PROGRESS NOTE  Date of patient's visit: 9/1/2020  Patient's Name:  Josefina Benoit YOB: 1970            Patient Care Team:  BRENNON Kellogg CNP as PCP - General (Nurse Practitioner)  BRENNON Kellogg CNP as PCP - [facility] Empaneled Provider  Sosa Cedeño DPM as Physician (Podiatry)  Michael Barth DPM as Physician (Podiatry)          Chief Complaint   Patient presents with    Diabetes    Foot Pain    Nail Problem    Benign Neoplasm       Subjective:   Josefina Benoit comes to clinic for Diabetes; Foot Pain; Nail Problem; and Benign Neoplasm    he is a diabetic and states that he is doing well. Pt currently has complaint of thickened, elongated nails that they cannot manage by themselves. Pt's primary care physician is BRENNON Kellogg CNP last seen 08/04/2020   Pt's last blood sugar was 121 this morning .    ,Pt also relates to painful skin spots to the bottom of both feet. Pt has tried pads and changing shoes but it has note helped the pain    Pt has a new complaint of dry itchy skin to the bottom of th efeet. .  Pt has tried changing shoes but it has not helped the pain  Patient is taking over the counter medications with no relief. Lab Results   Component Value Date    LABA1C 5.4 02/12/2020      Complains of numbness in the feet bilat.   Past Medical History:   Diagnosis Date    Bundle branch block, left     [facility]; last visit (virtual) April 2020    Diabetes [facility])     GERD (gastroesophageal reflux disease)     History of echocardiogram 2016    [facility]; [facility]    Hyperlipidemia     Hypertension     PCP Andrews Schulz NP, seen on 1/5/2020    Kidney calculi     Lumbar radiculopathy 6/9/2017    Non-ischemic cardiomyopathy ([address]     Obstructive sleep apnea syndrome 06/09/2017    No machine    PONV (postoperative nausea and vomiting)     needs scop patch for OR    Prolonged emergence from general anesthesia     Restless legs syndrome 6/9/2017    Severe obesity (BMI 35.0-39. 9) with comorbidity ([facility] [address] 7/31/2018    Wears dentures     Upper & lower       Allergies   Allergen Reactions    Keflex [Cephalexin] Diarrhea    Codeine Itching    Corticosteroids Swelling     Injection site swelling    Doxycycline      GERD    Ibuprofen Other (See Comments)     GERD    Metformin And Related Diarrhea     Current Outpatient Medications on File Prior to Visit   Medication Sig Dispense Refill    amitriptyline (ELAVIL) 25 MG tablet Take 2 tab po qhs 60 tablet 3    tiZANidine (ZANAFLEX) 4 MG tablet Take one tab nighly 30 tablet 6    carbidopa-levodopa (SINEMET)  MG per tablet TAKE 1/2 TABLET BY MOUTH AT 8PM AND 11PM 30 tablet 6    zinc oxide 20 % ointment MIX WITH KETOCONAZOLE 1:1 AND APPLY TO GROIN CREASES NIGHTLY 60 g 3    ketoconazole (NIZORAL) 2 % cream MIX WITH ZINC OXIDE 1:1 AND APPLY TO GROIN CREASES NIGHTLY 60 g 2    ASPIRIN LOW DOSE 81 MG EC tablet Take 1 tablet by mouth daily      latanoprost (XALATAN) 0.005 % ophthalmic solution Place 1 drop into both eyes daily      BENZOYL PEROXIDE 5 % external wash WASH CHEST AND BACK 1 TO 2 TIMES DAILY 227 g 3    atorvastatin (LIPITOR) 20 MG tablet Take 1 tablet by mouth nightly 30 tablet 5    Misc.  Devices MISC 1 PAIR OF DIABETIC SHOES (1 LEFT/ 1 RIGHT)  1-3 PAIRS OF INSERTS (LEFT/ RIGHT) 2 each 0    vitamin D (ERGOCALCIFEROL) 1.25 MG (77524 UT) CAPS capsule Take 1 capsule by mouth once a week      GLUCAGON EMERGENCY 1 MG injection       isosorbide mononitrate (IMDUR) 30 MG extended release tablet Take 1 tablet by mouth daily      HYDRALAZINE HCL PO Take 50 mg by mouth daily      Alcohol Swabs (B-D SINGLE USE SWABS REGULAR) PADS USE BEFORE TESTING 4 TIMES A DAY PLUS BEFORE USING INSULIN (5 TIMES A DAY) 365 each 2    TRUEplus Lancets 33G MISC USE TO TEST BLOOD SUGAR FOUR TIMES DAILY 100 each 11    lisinopril (PRINIVIL;ZESTRIL) 5 MG tablet Take 5 mg by mouth nightly       metoprolol succinate (TOPROL XL) 25 MG extended release tablet Take 25 mg by mouth daily      nystatin (MYCOSTATIN) 076728 UNIT/GM cream Apply topically 2 times daily. 15 g 3    Semaglutide (OZEMPIC, 1 MG/DOSE, SC) Inject 1 mg into the skin once a week Every Sunday      Sulfacetamide Sodium 9.8 % LOTN Apply to nose and cheeks twice daily (Patient taking differently: Apply topically as needed Apply to nose and cheeks twice daily) 113 g 3    omeprazole (PRILOSEC) 20 MG delayed release capsule Take 1 capsule by mouth every morning (before breakfast) 90 capsule 3    blood glucose monitor strips Test 4 times a day & as needed for symptoms of irregular blood glucose. 120 strip 3    insulin lispro (HUMALOG KWIKPEN) 100 UNIT/ML pen TIDbefore meals 141-180 6units,181-220 8units,221-260 10units,261-300 12units,301-350 14units,351-400 16 units,>400 18units [id number] daily (Patient taking differently: Inject into the skin 4 times daily (after meals and at bedtime) TIDbefore meals 141-180 6units,181-220 8units,221-260 10units,261-300 12units,301-350 14units,351-400 16 units,>400 18units [id number] daily) 90 mL 0    insulin glargine (BASAGLAR KWIKPEN) 100 UNIT/ML injection pen Inject 26 Units into the skin nightly (Patient taking differently: Inject into the skin 2 times daily 10 units in AM 10 Units bed time) 60 mL 0    Insulin Pen Needle (COMFORT EZ PEN NEEDLES) 32G X 4 MM MISC Use 5 times daily. Change needle each time with Victoza, Basaglar, and Humalog 100 each 11    ammonium lactate (AMLACTIN) 12 % cream APPLY TOPICALLY AS NEEDED.  (Patient taking differently: Apply topically as needed Apply topically as needed.) 280 g 2    Lancet Devices (SIMPLE DIAGNOSTICS LANCING DEV) MISC USE TO TEST BLOOD SUGAR 4 TIMES A DAY 1 each 3    TRUE METRIX BLOOD GLUCOSE TEST strip TEST BLOOD SUGAR 3 TO 4 TIMES DAILY 100 each 1    PHARMACIST CHOICE ALCOHOL 70 % PADS USE BEFORE TESTING SUGAR (4 TIMES) PLUS BEFORE USING INSULIN (5 TIMES A DAY) 270 each 11     No current facility-administered medications on file prior to visit. Review of Systems    Review of Systems:   History obtained from chart review and the patient  General ROS: negative for - chills, fatigue, fever, night sweats or weight gain  Constitutional: Negative for chills, diaphoresis, fatigue, fever and unexpected weight change. Musculoskeletal: Positive for arthralgias, gait problem and joint swelling. Neurological ROS: negative for - behavioral changes, confusion, headaches or seizures. Negative for weakness and numbness. Dermatological ROS: negative for - mole changes, rash  Cardiovascular: Negative for leg swelling. Gastrointestinal: Negative for constipation, diarrhea, nausea and vomiting. Objective:  Dermatologic Exam:  Skin lesion present to the right and left plantar foot with a central core and petchaie noted to the lesions periphery.   Pain on palpation of the lesion      Dry scaly skin with annular lesions that are flat and dry  Skin is thin, with flaky sloughing skin as well as decreased hair growth to the lower leg  Small red hemosiderin deposits seen dorsal foot   Musculoskeletal:     1st MPJ ROM decreased, Bilateral.  Muscle strength 5/5, Bilateral.  Pain present upon palpation of toenails 1-5, Bilateral. decreased medial longitudinal arch, Bilateral.  Ankle ROM decreased,Bilateral.    Dorsally contracted digits present digits 2, Bilateral.     Vascular: DP pulses 1/4 bilateral.  PT pulses 0/4 bilateral.   CFT <5 seconds, Bilateral.  Hair growth absent to the level of the digits, Bilateral.  Edema present, Bilateral.  Varicosities absent, Bilateral. Erythema absent, Bilateral    Neurological: Sensation diminshed to light touch to level of digits, Bilateral.  Protective sensation intact 6/10 sites via 5.07/10g [place] Monofilament, Bilateral.  negative Tinel's, Bilateral.  negative Valleix sign, Bilateral.      Integument: Warm, dry, supple, Bilateral.  Open lesion absent, Bilateral.  Interdigital maceration absent to web spaces 4, Bilateral.  Nails 1-5 left and 1-5 right thickened > 3.0 mm, dystrophic and crumbly, discolored with subungual debris. Fissures absent, Bilateral.   General: AAO x 3 in NAD. Derm  Toenail Description  Sites of Onychomycosis Involvement (Check affected area)  [x] [x] [x] [x] [x] [x] [x] [x] [x] [x]  5 4 3 2 1 1 2 3 4 5                          Right                                        Left    Thickness  [x] [x] [x] [x] [x] [x] [x] [x] [x] [x]  5 4 3 2 1 1 2 3 4 5                         Right                                        Left    Dystrophic Changes   [x] [x] [x] [x] [x] [x] [x] [x] [x] [x]  5 4 3 2 1 1 2 3 4 5                         Right                                        Left    Color   [x] [x] [x] [x] [x] [x] [x] [x] [x] [x]  5 4 3 2 1 1 2 3 4 5                          Right                                        Left    Incurvation/Ingrowin   [] [] [] [] [] [] [] [] [] []  5 4 3 2 1 1 2 3 4 5                         Right                                        Left    Inflammation/Pain   [x] [x] [x] [x] [x] [x] [x] [x] [x] [x]  5 4 3 2 1 1 2 3 4 5                         Right                                        Left        Visual inspection:  Deformity: hammertoe deformity digna feet  amputation: absent  Skin lesions: present - as above  Edema: right- 2+ pitting edema, left- 2+ pitting edema    Sensory exam:  Monofilament sensation: abnormal - 6/10 via SW 5.07/10g monofilament to the plantar foot bilateral feet    Pulses: abnormal - 1/4 dorsalis pedis pulse and 1/4 Posterior tibial pulse,   Pinprick: Impaired  Proprioception: Impaired  Vibration (128 Hz):  Impaired       DM with PVD       [x]Yes    []No      Assessment:  45 y.o. male with:   Diagnosis Orders noted and will be managed by PCP.  9/1/2020    Electronically signed by Serenity Zhu DPM on 9/1/2020 at 1:28 PM  9/1/2020

## 2020-09-22 ENCOUNTER — OFFICE VISIT (OUTPATIENT)
Dept: FAMILY MEDICINE CLINIC | Age: 50
End: 2020-09-22
Payer: MEDICARE

## 2020-09-22 VITALS
BODY MASS INDEX: 38.43 KG/M2 | TEMPERATURE: 98.1 F | SYSTOLIC BLOOD PRESSURE: 138 MMHG | DIASTOLIC BLOOD PRESSURE: 80 MMHG | OXYGEN SATURATION: 95 % | HEART RATE: 99 BPM | HEIGHT: 73 IN | WEIGHT: 290 LBS

## 2020-09-22 LAB — HBA1C MFR BLD: 5.7 %

## 2020-09-22 PROCEDURE — 83036 HEMOGLOBIN GLYCOSYLATED A1C: CPT | Performed by: NURSE PRACTITIONER

## 2020-09-22 PROCEDURE — 2022F DILAT RTA XM EVC RTNOPTHY: CPT | Performed by: NURSE PRACTITIONER

## 2020-09-22 PROCEDURE — 3044F HG A1C LEVEL LT 7.0%: CPT | Performed by: NURSE PRACTITIONER

## 2020-09-22 PROCEDURE — 99214 OFFICE O/P EST MOD 30 MIN: CPT | Performed by: NURSE PRACTITIONER

## 2020-09-22 PROCEDURE — G8417 CALC BMI ABV UP PARAM F/U: HCPCS | Performed by: NURSE PRACTITIONER

## 2020-09-22 PROCEDURE — 3017F COLORECTAL CA SCREEN DOC REV: CPT | Performed by: NURSE PRACTITIONER

## 2020-09-22 PROCEDURE — G8427 DOCREV CUR MEDS BY ELIG CLIN: HCPCS | Performed by: NURSE PRACTITIONER

## 2020-09-22 PROCEDURE — 1036F TOBACCO NON-USER: CPT | Performed by: NURSE PRACTITIONER

## 2020-09-22 ASSESSMENT — PATIENT HEALTH QUESTIONNAIRE - PHQ9
1. LITTLE INTEREST OR PLEASURE IN DOING THINGS: 0
SUM OF ALL RESPONSES TO PHQ QUESTIONS 1-9: 0
SUM OF ALL RESPONSES TO PHQ QUESTIONS 1-9: 0
2. FEELING DOWN, DEPRESSED OR HOPELESS: 0
SUM OF ALL RESPONSES TO PHQ9 QUESTIONS 1 & 2: 0

## 2020-09-22 NOTE — PROGRESS NOTES
Darron Purcell, ALEXEI-C  [address]  [id number] [facility]. [address]  [phone]  [phone]    Gemma Kaminski is a 45 y.o. male who is here with c/o of:    Chief Complaint: Annual Exam      Patient Accompanied by: n/a    HPI - Gemma Kaminski is here today for f/u of chronic conditions:    DM   - patient is under the care of endocrinology for this problem   - current med: Lantus 10units bid, Humalog S.S. - No Ozempic/Trulicity d/t insurance coverage   - home b.s. - this a.m. 127   - he denies recent weight gain/loss, polyuria, polydipsia,   - he has c/o polyneuropathy and sees neurology for this   - eye exam through [facility] - glaucoma  Lab Results   Component Value Date    LABA1C 5.7 09/22/2020    LABA1C 5.4 02/12/2020    LABA1C 5.5 11/15/2019     Lab Results   Component Value Date    LABMICR 23 (H) 02/12/2020    [address] (A) 11/19/2019    CREATININE 0.90 02/10/2020         HTN   - he is compliant with low sodium diet on most days and does not regularly exercise   - he denies h/a, c/p, palpitations, sob, lower extremity edema   - current med: lisinopril 5mg nightly, metoprolol 25mg daily, Imdur 30mg daily   - patient is under the care of cardiology - [facility]          Patient Active Problem List:     Diabetes mellitus ([address]     Hyperlipidemia     Hypertension     Lumbar radiculopathy     Obstructive sleep apnea syndrome     Restless legs syndrome (RLS)     Severe obesity (BMI 35.0-39. 9) with comorbidity (HCC)     Numbness and tingling in both hands     Neuropathic pain of both legs     Gait difficulty     Diabetic polyneuropathy associated with type 2 diabetes mellitus (HCC)     Muscle spasms of both lower extremities     Ulnar neuropathy of right upper extremity     Right median nerve neuropathy     Mass of right elbow     Past Medical History:   Diagnosis Date    Bundle branch block, left     [facility]; last visit (virtual) April 2020   Yumiko Coley Diabetes ([facility]     GERD (gastroesophageal reflux disease)     History of echocardiogram 2016    [facility]; [facility]    Hyperlipidemia     Hypertension     PCP COLE Ponce NP, seen on 1/5/2020    Kidney calculi     Lumbar radiculopathy 6/9/2017    Non-ischemic cardiomyopathy ([facility]     Obstructive sleep apnea syndrome 06/09/2017    No machine    PONV (postoperative nausea and vomiting)     needs scop patch for OR    Prolonged emergence from general anesthesia     Restless legs syndrome 6/9/2017    Severe obesity (BMI 35.0-39. 9) with comorbidity ([facility] 7/31/2018    Wears dentures     Upper & lower      Past Surgical History:   Procedure Laterality Date    ANKLE SURGERY Right     ORIF    CARDIAC CATHETERIZATION  01/2020    \"at a [facility]\";   No blockages    CARPAL TUNNEL RELEASE Right 05/28/2020    CUBITAL, GUYONS CANAL AND CARPAL TUNNEL RELEASE     CARPAL TUNNEL RELEASE Right 5/28/2020    CUBITAL, GUYONS CANAL AND CARPAL TUNNEL RELEASE performed by Shruthi Padilla DO at [facility]    No polyps    GLAUCOMA SURGERY Bilateral 12/2018    HC INJECTION PROCEDURE FOR SACROILIAC JOINT Bilateral 2/21/2020    SACROILIAC JOINT INJECTION performed by [facility]ardo Hyatt MD [facility] Right     SINUS SURGERY       Family History   Problem Relation Age of Onset    High Cholesterol Mother     Depression Mother     Heart Disease Father         stents    High Cholesterol Father     Alcohol Abuse Father     Heart Surgery Father         CABG    Heart Attack Father     Lung Cancer Father         primary, transferred to the bones    Dementia Father     Diabetes Maternal Grandmother     Diabetes Maternal Grandfather     No Known Problems Paternal Grandmother     No Known Problems Paternal Grandfather     No Known Problems Half-Sister     No Known Problems Half-Brother     Thyroid Disease Daughter      Social History     Tobacco Use    Smoking status: Former Smoker     Packs/day: 3.00     Years: 20.00     Pack years: 60.00     Types: Cigarettes     Start date: 10/1987     Last attempt to quit: 2010     Years since quitting: 10.7    Smokeless tobacco: Never Used    Tobacco comment: Currently Vapes   Substance Use Topics    Alcohol use: Yes     Frequency: Monthly or less     Comment: SOCIAL     ALLERGIES:    Allergies   Allergen Reactions    Keflex [Cephalexin] Diarrhea    Codeine Itching    Corticosteroids Swelling     Injection site swelling    Doxycycline      GERD    Ibuprofen Other (See Comments)     GERD    Metformin And Related Diarrhea          Subjective     · Constitutional:  Negative for activity change, appetite change,unexpected weight change, chills, fever, and fatigue. · HENT: Negative for ear pain, sore throat,  Rhinorrhea, sinus pain, sinus pressure, congestion. · Eyes:  Negative for pain and discharge. · Respiratory:  Negative for chest tightness, shortness of breath, wheezing, and cough. · Cardiovascular:  Negative for chest pain, palpitations and leg swelling. · Gastrointestinal: Negative for abdominal pain, blood in stool, constipation,diarrhea, nausea and vomiting. · Endocrine: Negative for cold intolerance, heat intolerance, polydipsia, polyphagia and polyuria. · Genitourinary: Negative for difficulty urinating, dysuria, flank pain, frequency, hematuria and urgency. · Musculoskeletal: Negative for arthralgias, joint swelling, myalgias, neck pain and neck stiffness. Positive for back pain  · Skin: Negative for rash and wound. · Allergic/Immunologic: Negative for environmental allergies and food allergies. · Neurological:  Negative for dizziness, light-headedness, numbness and headaches. Positive for numbness/tingling to bilateral upper/lower extremities  · Hematological:  Negative for adenopathy. Does not bruise/bleed easily.    · Psychiatric/Behavioral: Negative for self-injury, sleep disturbance and suicidal ideas.     Objective     PHYSICAL EXAM:   · Constitutional: Dyane Countess is oriented to person, place, and time. Vital signs are normal. Appears well-developed and well-nourished. · HEENT:   · Head: Normocephalic and atraumatic. Right Ear: Hearing and external ear normal.     · Left Ear: Hearing and external ear normal.    · Eyes:PERRL, EOMI, Conjunctiva normal, No discharge. · Neck: Full passive range of motion. Non-tender on palpation. Neck supple. No thyromegaly present. Trachea normal.  · Cardiovascular: Normal rate, regular rhythm, S1, S2, no murmur, no gallop, no friction rub, intact distal pulses. No carotid bruit  · Pulmonary/Chest: Breath sounds are clear throughout, No respiratory distress, No wheezing, No chest tenderness. Effort normal  · Musculoskeletal: Extremities appear regular and symmetric. No evident masses, lesions, foreign bodies, or other abnormalities. No edema. No tenderness on palpation. Joints are stable. Full ROM, strength and tone are within normal limits. · Lymphadenopathy: No lymphadenopathy noted. · Neurological: Alert and oriented to person, place, and time. Normal motor function, Normal sensory function, No focal deficits noted. He has normal strength. · Skin: Skin is warm, dry and intact. No obvious lesions on exposed skin  · Psychiatric: Normal mood and affect. Speech is normal and behavior is normal.     Nursing note and vitals reviewed. Blood pressure 138/80, pulse 99, temperature 98.1 °F (36.7 °C), temperature source Temporal, height 6' 1\" (1.854 m), weight 290 lb (131.5 kg), SpO2 95 %. Body mass index is 38.26 kg/m².     Wt Readings from Last 3 Encounters:   09/22/20 290 lb (131.5 kg)   09/01/20 265 lb (120.2 kg)   07/22/20 265 lb (120.2 kg)     BP Readings from Last 3 Encounters:   09/22/20 138/80   07/22/20 121/89   06/24/20 116/81       Results for POC orders placed in visit on 09/22/20   POCT glycosylated hemoglobin (Hb A1C)   Result Value Ref Range    Hemoglobin A1C 5.7 %       Completed Orders/Prescriptions   No orders of the defined types were placed in this encounter. AssessmentPlan/Medical Decision Making     1. Type 2 diabetes mellitus with hyperglycemia, with long-term current use of insulin (HCC)  - controlled  - f/u with endocrinology as scheduled - notes reviewed  - POCT glycosylated hemoglobin (Hb A1C)    2. Diabetic polyneuropathy associated with type 2 diabetes mellitus ([facility]  - f/u with neurology as scheduled    3. Essential hypertension  - controlled  - f/u with cardiology as scheduled - cardiology notes reviewed  - reviewed DASH diet    4. Screening for colorectal cancer  - Cologuard (For External Results Only); Future    5. Immunization due  - INFLUENZA, QUADV, 3 YRS AND OLDER, IM PF, PREFILL SYR OR SDV, 0.5ML (AFLURIA QUADV, PF)      Return in about 6 months (around 3/22/2021) for Routine follow up of chronic conditions. 1.  Vickey Mondragon received counseling on the following healthy behaviors: nutrition, exercise and medication adherence  2. Patient given educational materials - see patient instructions  3. Was a self-tracking handout given in paper form or via [employer]? No  If yes, see orders or list here. 4.  Discussed use, benefit, and side effects of prescribed medications. Barriers to medication compliance addressed. All patient questions answered. Pt voiced understanding. 5.  Reviewed prior labs, imaging, consultation, follow up, and health maintenance  6. Continue current medications, diet and exercise. 7. Discussed use, benefit, and side effects of prescribed medications. Barriers to medication compliance addressed. All her questions were answered. Pt voiced understanding. Vickey Mondragon will continue current medications, diet and exercise.       Of the 25 minute duration appointment visit, Ilene Denney CNP spent at least 50% of the face-to-face time in counseling, explanation of diagnosis, planning of further management, and answering all questions. Signed:  Sherita Marlow CNP    This note is created with the assistance of a speech-recognition program.  While intending to generate a document that actually reflects the content of the visit, no guarantees can be provided that every mistake has been identified and corrected by editing.

## 2020-09-24 PROCEDURE — 90471 IMMUNIZATION ADMIN: CPT | Performed by: NURSE PRACTITIONER

## 2020-09-24 PROCEDURE — 90686 IIV4 VACC NO PRSV 0.5 ML IM: CPT | Performed by: NURSE PRACTITIONER

## 2020-10-05 ENCOUNTER — PREP FOR PROCEDURE (OUTPATIENT)
Dept: GENERAL RADIOLOGY | Age: 50
End: 2020-10-05

## 2020-10-05 RX ORDER — SODIUM CHLORIDE 9 MG/ML
INJECTION, SOLUTION INTRAVENOUS CONTINUOUS
Status: CANCELLED | OUTPATIENT
Start: 2020-10-05

## 2020-10-06 RX ORDER — INSULIN LISPRO 100 [IU]/ML
INJECTION, SOLUTION INTRAVENOUS; SUBCUTANEOUS
Qty: 3 ML | OUTPATIENT
Start: 2020-10-06

## 2020-10-06 NOTE — TELEPHONE ENCOUNTER
Patient is under the care of endocrinology for this problem.  Please ask patient to request this from endocrinology

## 2020-10-06 NOTE — TELEPHONE ENCOUNTER
Nesha Delgado is calling to request a refill on the following medication(s):    Medication Request:  Requested Prescriptions     Pending Prescriptions Disp Refills    insulin lispro, 1 Unit Dial, 100 UNIT/ML SOPN [Pharmacy Med Name: INSULIN LISPRO KWIKPEN 3MLX5] 3 mL      Sig: INJECT ONE TO 14 UNITS SUBCUTANEOUSLY THREE TIMES A DAY BEFORE MEALS PER SLIDING SCALE       Last Visit Date (If Applicable):  4/41/8679 in office    Next Visit Date:    3/22/2021

## 2020-10-12 ENCOUNTER — TELEPHONE (OUTPATIENT)
Dept: FAMILY MEDICINE CLINIC | Age: 50
End: 2020-10-12

## 2020-10-12 NOTE — TELEPHONE ENCOUNTER
Cover my meds called because a PA for Ozempic was sent. I don't see anything in the chart.  If you need assistance call cover my meds     Phone number: [phone]   Ref# [id number]

## 2020-10-13 ENCOUNTER — OFFICE VISIT (OUTPATIENT)
Dept: PULMONOLOGY | Age: 50
End: 2020-10-13
Payer: MEDICARE

## 2020-10-13 VITALS
WEIGHT: 287.6 LBS | HEART RATE: 68 BPM | DIASTOLIC BLOOD PRESSURE: 80 MMHG | HEIGHT: 73 IN | RESPIRATION RATE: 16 BRPM | SYSTOLIC BLOOD PRESSURE: 152 MMHG | BODY MASS INDEX: 38.12 KG/M2 | OXYGEN SATURATION: 96 %

## 2020-10-13 PROCEDURE — 99244 OFF/OP CNSLTJ NEW/EST MOD 40: CPT | Performed by: INTERNAL MEDICINE

## 2020-10-13 PROCEDURE — G8417 CALC BMI ABV UP PARAM F/U: HCPCS | Performed by: INTERNAL MEDICINE

## 2020-10-13 PROCEDURE — 3023F SPIROM DOC REV: CPT | Performed by: INTERNAL MEDICINE

## 2020-10-13 PROCEDURE — G8427 DOCREV CUR MEDS BY ELIG CLIN: HCPCS | Performed by: INTERNAL MEDICINE

## 2020-10-13 PROCEDURE — G8482 FLU IMMUNIZE ORDER/ADMIN: HCPCS | Performed by: INTERNAL MEDICINE

## 2020-10-13 PROCEDURE — G8926 SPIRO NO PERF OR DOC: HCPCS | Performed by: INTERNAL MEDICINE

## 2020-10-14 ENCOUNTER — TELEPHONE (OUTPATIENT)
Dept: FAMILY MEDICINE CLINIC | Age: 50
End: 2020-10-14

## 2020-10-14 ENCOUNTER — OFFICE VISIT (OUTPATIENT)
Dept: NEUROSURGERY | Age: 50
End: 2020-10-14
Payer: MEDICARE

## 2020-10-14 VITALS
HEIGHT: 73 IN | WEIGHT: 282 LBS | DIASTOLIC BLOOD PRESSURE: 88 MMHG | SYSTOLIC BLOOD PRESSURE: 149 MMHG | BODY MASS INDEX: 37.37 KG/M2 | OXYGEN SATURATION: 96 % | HEART RATE: 90 BPM | TEMPERATURE: 97.1 F

## 2020-10-14 PROCEDURE — G8427 DOCREV CUR MEDS BY ELIG CLIN: HCPCS | Performed by: NEUROLOGICAL SURGERY

## 2020-10-14 PROCEDURE — 1036F TOBACCO NON-USER: CPT | Performed by: NEUROLOGICAL SURGERY

## 2020-10-14 PROCEDURE — 99213 OFFICE O/P EST LOW 20 MIN: CPT | Performed by: NEUROLOGICAL SURGERY

## 2020-10-14 PROCEDURE — G8417 CALC BMI ABV UP PARAM F/U: HCPCS | Performed by: NEUROLOGICAL SURGERY

## 2020-10-14 PROCEDURE — G8482 FLU IMMUNIZE ORDER/ADMIN: HCPCS | Performed by: NEUROLOGICAL SURGERY

## 2020-10-14 PROCEDURE — 3017F COLORECTAL CA SCREEN DOC REV: CPT | Performed by: NEUROLOGICAL SURGERY

## 2020-10-14 NOTE — PROGRESS NOTES
Esequiel Parks  MOB # [address]  [address]  Dept: [phone]    Patient:  Torri Gonzalez  YOB: 1970  Date: 10/14/20    The patient is a 45 y.o. male who presents today for consult of the following problems:     Chief Complaint   Patient presents with    Follow-up     DDD             HPI:     Torri Gonzalez is a 45 y.o. male on whom neurosurgical consultation was requested by BRENNON Francis CNP for management of axial back imaging represented tension worse with significant peers and ambulating. Bilateral radiation to bilateral extremities approximately some distributional down the posterior answering in her aspect of the thigh as well as the calf. Constant numbness in bilateral feet as well as the upper hands. No resolution of the right upper extremity numbness that is been diffuse. Some tenderness and discomfort over the incision on the right cytokines region. Has been to injections therapy and no prior chiropractic work previously. Believe with any type of conservative measures thus far. [place] St[place] History:     Past Medical History:   Diagnosis Date    Bundle branch block, left     [facility]; last visit (virtual) April 2020    Diabetes [facility])     GERD (gastroesophageal reflux disease)     History of echocardiogram 2016    [facility]; [facility]    Hyperlipidemia     Hypertension     PCP COLE Ponce NP, seen on 1/5/2020    Kidney calculi     Lumbar radiculopathy 6/9/2017    Non-ischemic cardiomyopathy ([address]     Obstructive sleep apnea syndrome 06/09/2017    No machine    PONV (postoperative nausea and vomiting)     needs scop patch for OR    Prolonged emergence from general anesthesia     Restless legs syndrome 6/9/2017    Severe obesity (BMI 35.0-39. 9) with comorbidity ([address] 7/31/2018    Wears dentures     Upper & lower     Past Surgical History:   Procedure Laterality Date    ANKLE SURGERY Right ORIF    CARDIAC CATHETERIZATION  01/2020    \"at a [facility]\"; No blockages    CARPAL TUNNEL RELEASE Right 05/28/2020    CUBITAL, GUYONS CANAL AND CARPAL TUNNEL RELEASE     CARPAL TUNNEL RELEASE Right 5/28/2020    CUBITAL, GUYONS CANAL AND CARPAL TUNNEL RELEASE performed by Olga Garcia DO at [facility]    No polyps    GLAUCOMA SURGERY Bilateral 12/2018    HC INJECTION PROCEDURE FOR SACROILIAC JOINT Bilateral 2/21/2020    SACROILIAC JOINT INJECTION performed by Tonie Joe MD at [facility] Right     SINUS SURGERY       Family History   Problem Relation Age of Onset    High Cholesterol Mother     Depression Mother     Heart Disease Father         stents    High Cholesterol Father     Alcohol Abuse Father     Heart Surgery Father         CABG    Heart Attack Father     Lung Cancer Father         primary, transferred to the bones    Dementia Father     Diabetes Maternal Grandmother     Diabetes Maternal Grandfather     No Known Problems Paternal Grandmother     No Known Problems Paternal Grandfather     No Known Problems Half-Sister     No Known Problems Half-Brother     Thyroid Disease Daughter      Current Outpatient Medications on File Prior to Visit   Medication Sig Dispense Refill    clotrimazole-betamethasone (LOTRISONE) 1-0.05 % cream Apply topically 2 times daily.  45 g 2    amitriptyline (ELAVIL) 25 MG tablet Take 2 tab po qhs 60 tablet 3    tiZANidine (ZANAFLEX) 4 MG tablet Take one tab nighly 30 tablet 6    carbidopa-levodopa (SINEMET)  MG per tablet TAKE 1/2 TABLET BY MOUTH AT 8PM AND 11PM 30 tablet 6    zinc oxide 20 % ointment MIX WITH KETOCONAZOLE 1:1 AND APPLY TO GROIN CREASES NIGHTLY 60 g 3    ketoconazole (NIZORAL) 2 % cream MIX WITH ZINC OXIDE 1:1 AND APPLY TO GROIN CREASES NIGHTLY 60 g 2    ASPIRIN LOW DOSE 81 MG EC tablet Take 1 tablet by mouth daily      latanoprost (XALATAN) 0.005 % ophthalmic solution Place 1 drop into both eyes daily      BENZOYL PEROXIDE 5 % external wash WASH CHEST AND BACK 1 TO 2 TIMES DAILY 227 g 3    atorvastatin (LIPITOR) 20 MG tablet Take 1 tablet by mouth nightly 30 tablet 5    Misc. Devices MISC 1 PAIR OF DIABETIC SHOES (1 LEFT/ 1 RIGHT)  1-3 PAIRS OF INSERTS (LEFT/ RIGHT) 2 each 0    vitamin D (ERGOCALCIFEROL) 1.25 MG (38516 UT) CAPS capsule Take 1 capsule by mouth once a week      GLUCAGON EMERGENCY 1 MG injection       isosorbide mononitrate (IMDUR) 30 MG extended release tablet Take 1 tablet by mouth daily      HYDRALAZINE HCL PO Take 50 mg by mouth daily      Alcohol Swabs (B-D SINGLE USE SWABS REGULAR) PADS USE BEFORE TESTING 4 TIMES A DAY PLUS BEFORE USING INSULIN (5 TIMES A DAY) 365 each 2    TRUEplus Lancets 33G MISC USE TO TEST BLOOD SUGAR FOUR TIMES DAILY 100 each 11    lisinopril (PRINIVIL;ZESTRIL) 5 MG tablet Take 5 mg by mouth nightly       metoprolol succinate (TOPROL XL) 25 MG extended release tablet Take 25 mg by mouth daily      nystatin (MYCOSTATIN) 288352 UNIT/GM cream Apply topically 2 times daily. 15 g 3    Semaglutide (OZEMPIC, 1 MG/DOSE, SC) Inject 1 mg into the skin once a week Every Sunday      Sulfacetamide Sodium 9.8 % LOTN Apply to nose and cheeks twice daily (Patient taking differently: Apply topically as needed Apply to nose and cheeks twice daily) 113 g 3    omeprazole (PRILOSEC) 20 MG delayed release capsule Take 1 capsule by mouth every morning (before breakfast) 90 capsule 3    blood glucose monitor strips Test 4 times a day & as needed for symptoms of irregular blood glucose. 120 strip 3    Insulin Pen Needle (COMFORT EZ PEN NEEDLES) 32G X 4 MM MISC Use 5 times daily. Change needle each time with Victoza, Basaglar, and Humalog 100 each 11    ammonium lactate (AMLACTIN) 12 % cream APPLY TOPICALLY AS NEEDED.  (Patient taking differently: Apply topically as needed Apply topically as needed.) 280 g 2    Lancet Devices (SIMPLE DIAGNOSTICS LANCING DEV) MISC USE TO TEST BLOOD SUGAR 4 TIMES A DAY 1 each 3    TRUE METRIX BLOOD GLUCOSE TEST strip TEST BLOOD SUGAR 3 TO 4 TIMES DAILY 100 each 1    PHARMACIST CHOICE ALCOHOL 70 % PADS USE BEFORE TESTING SUGAR (4 TIMES) PLUS BEFORE USING INSULIN (5 TIMES A DAY) 270 each 11     No current facility-administered medications on file prior to visit. Social History     Tobacco Use    Smoking status: Former Smoker     Packs/day: 3.00     Years: 20.00     Pack years: 60.00     Types: Cigarettes     Start date: 10/1987     Last attempt to quit: 2010     Years since quitting: 10.7    Smokeless tobacco: Never Used    Tobacco comment: Currently Vapes   Substance Use Topics    Alcohol use: Yes     Frequency: Monthly or less     Comment: SOCIAL    Drug use: Not Currently       Allergies   Allergen Reactions    Keflex [Cephalexin] Diarrhea    Codeine Itching    Corticosteroids Swelling     Injection site swelling    Doxycycline      GERD    Ibuprofen Other (See Comments)     GERD    Metformin And Related Diarrhea       Review of Systems  Constitutional: Negative for activity change and appetite change. HENT: Negative for ear pain and facial swelling. Eyes: Negative for discharge and itching. Respiratory: Negative for choking and chest tightness. Cardiovascular: Negative for chest pain and leg swelling. Gastrointestinal: Negative for nausea and abdominal pain. Endocrine: Negative for cold intolerance and heat intolerance. Genitourinary: Negative for frequency and flank pain. Musculoskeletal: Negative for myalgias and joint swelling. Skin: Negative for rash and wound. Allergic/Immunologic: Negative for environmental allergies and food allergies. Hematological: Negative for adenopathy. Does not bruise/bleed easily. Psychiatric/Behavioral: Negative for self-injury. The patient is not nervous/anxious.       Physical Exam:      BP (!) 149/88 (Site: Right Upper Arm, Position: Sitting, Cuff Size: Large Adult)   Pulse 90   Temp 97.1 °F (36.2 °C) (Temporal)   Ht 6' 1\" (1.854 m)   Wt 282 lb (127.9 kg)   SpO2 96%   BMI 37.21 kg/m²   Estimated body mass index is 37.21 kg/m² as calculated from the following:    Height as of this encounter: 6' 1\" (1.854 m). Weight as of this encounter: 282 lb (127.9 kg). General:  Geovany Mock is a 45y.o. year old male who appears his stated age. HEENT: Normocephalic atraumatic. Neck supple. Chest: regular rate; pulses equal  Abdomen: Soft nontender nondistended. Normoactive bowel sounds. Ext: DP and PT pulses 2+, good cap refill  Neuro    Mentation  Appropriate affect  Registration intact  Orientation intact  3 item recall intact  Judgement intact to situation    Cranial Nerves:   Pupils equal and reactive to light  Extraocular motion intact  Face and shrug symmetric  Tongue midline  No dysarthria  v1-3 sensation symmetric, masseter tone symmetric  Hearing symmetric and intact to finger rub    Sensation:   Diminished glove and stocking distribution bilateral upper and lower extremity symmetric. Motor  L deltoid 5/5; R deltoid 5/5  L biceps 5/5; R biceps 5/5  L triceps 5/5; R triceps 5/5  L wrist extension 5/5; R wrist extension 5/5  L intrinsics 5/5; R intrinsics 5/5     L iliopsoas 5/5 , R iliopsoas 5/5  L quadriceps 5/5; R quadriceps 5/5  L Dorsiflexion 5/5; R dorsiflexion 5/5  L Plantarflexion 5/5; R plantarflexion 5/5  L EHL 5/5; R EHL 5/5    Reflexes  L Brachioradialis 2+/4; R brachioradialis 2+/4  L Biceps 2+/4; R Biceps 2+/4  L Triceps 2+/4; R Triceps 2+/4  L Patellar 2+/4: R Patellar 2+/4  L Achilles 2+/4; R Achilles 2+/4    hoffmans L: neg  hoffmans R: neg  Clonus L: neg  Clonus R: neg  Babinski L: up  Babinski R; up    No susy, no TTP over SI joint  No tenderness over the bicipital joint. Negative empty can. Some moderate tenderness over the anterior deltoid attachment of the glenohumeral joint.     Studies Review:     MRI lumbar spine with desiccation loss of height as well as some broad-based protrusion at L5-S1. Maintained lordosis. Cervical with multilevel spondylosis no focal stenosis. MRI at the C5-6 level bilateral foraminal disease. Assessment and Plan:      1. Deltoid tendinitis of left shoulder    2. Neuropathy    3. Lumbar disc disease with radiculopathy          Plan: Patient with neuropathy that does not improve whatsoever with decompressive surgery. Would not advise treatment of the left hand. Full-scale multilevel decompression with no improvement. Regarding the MRI of the lumbar spine I did explain to him that he does have focal disease at L5-S1 with complete discogenic collapse loss and desiccation. In the event that we are able to prove that there is pain originating from the source by discogram and he has failed all conservative measures we may consider surgical intervention. At this time he would like to hold off and attempt chiropractor interventions. Distinct focal tenderness over the anterior deltoid attachment. Would like to hold of referral at this time. Will talk to pcp if referral needed    Followup: No follow-ups on file. Prescriptions Ordered:  No orders of the defined types were placed in this encounter. Orders Placed:  No orders of the defined types were placed in this encounter. Electronically signed by Hoa Lopez DO on 10/14/2020 at 1:51 PM    Please note that this chart was generated using voice recognition Dragon dictation software. Although every effort was made to ensure the accuracy of this automated transcription, some errors in transcription may have occurred.

## 2020-10-14 NOTE — TELEPHONE ENCOUNTER
Patient called to see a Gastro doctor where his daughter works.  He will call back with a doctor name or facility name

## 2020-10-15 NOTE — PROGRESS NOTES
PULMONARY OP CONSULTATION        REFERRED BY: BRENNON Hatch CNP    REASON FOR CONSULTATION: Sleep apnea    HISTORY OF PRESENT ILLNESS:    Michael Richards is a 45y.o. 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  In addition to all these exposures patient has significant smoking history of about 60 pack years but quit smoking in 2010  Has some wheezing along with dyspnea  Not much coughing or sputum production  No hemoptysis  No orthopnea or PND  No chest pain or pressure  No fever chills or night sweats  No weight loss or loss of appetite        PAST MEDICAL HISTORY:       Diagnosis Date    Bundle branch block, left     [facility]; last visit (virtual) April 2020    Diabetes [facility])     GERD (gastroesophageal reflux disease)     History of echocardiogram 2016    [facility]; [facility]    Hyperlipidemia     Hypertension     PCP COLE Ponce NP, seen on 1/5/2020    Kidney calculi     Lumbar radiculopathy 6/9/2017    Non-ischemic cardiomyopathy ([address]     Obstructive sleep apnea syndrome 06/09/2017    No machine    PONV (postoperative nausea and vomiting)     needs scop patch for OR    Prolonged emergence from general anesthesia     Restless legs syndrome 6/9/2017    Severe obesity (BMI 35.0-39. 9) with comorbidity ([address] 7/31/2018    Wears dentures     Upper & lower       SURGICAL INSULIN (5 TIMES A DAY) 270 each 11     No current facility-administered medications for this visit. FAMILY HISTORY: family history includes Alcohol Abuse in his father; Dementia in his father; Depression in his mother; Diabetes in his maternal grandfather and maternal grandmother; Heart Attack in his father; Heart Disease in his father; Heart Surgery in his father; High Cholesterol in his father and mother; [place] in his father; No Known Problems in his half-brother, half-sister, paternal grandfather, and paternal grandmother; Thyroid Disease in his daughter. SOCIAL AND OCCUPATIONAL HEALTH:  The patient is a Past smoker of more than 30 pack years and quit smoking in 2010. There  is not history of TB or TB exposure. There is asbestos and silica dust exposure. The patient reports does not have coal, foundry, quarry or [employer] exposure. Travel history reveals no significant history of risk factors for pulm disease. Patient had exposure to automotive paints. There is not  history of recreational or IV drug use. The patient does not pets, dogs, cats turtles or exotic birds.         Review of Systems:  Review of Systems -   General ROS: Completed and except as mentioned above were negative   Psychological ROS:  Completed and except as mentioned above were negative  Ophthalmic ROS:  Completed and except as mentioned above were negative  ENT ROS:  Completed and except as mentioned above were negative  Allergy and Immunology ROS:  Completed and except as mentioned above were negative  Hematological and Lymphatic ROS:  Completed and except as mentioned above were negative  Endocrine ROS: Completed and except as mentioned above were negative  Breast ROS:  Completed and except as mentioned above were negative  Respiratory ROS:  Completed and except as mentioned above were negative  Cardiovascular ROS:  Completed and except as mentioned above were negative  Gastrointestinal ROS: Completed and except as mentioned above were negative  Genito-Urinary ROS:  Completed and except as mentioned above were negative  Musculoskeletal ROS:  Completed and except as mentioned above were negative  Neurological ROS:  Completed and except as mentioned above were negative  Dermatological ROS:  Completed and except as mentioned above were negative    SLEEP  No epistaxis or sore throat. Has daytime sleepiness and fatigue and tiredness along with snoring and observed apnea and choking episodes during sleep. Does not have a CPAP machine currently  No MVA. No edema. PHYSICAL EXAMINATION:  Vitals:    10/13/20 1312 10/13/20 1314   BP: (!) 142/89 (!) 152/80   Site: Right Wrist Left Wrist   Pulse: 68    Resp: 16    SpO2: 96%    Weight: 287 lb 9.6 oz (130.5 kg)    Height: 6' 1\" (1.854 m)      PHYSICAL EXAMINATION:  Vitals:    10/13/20 1312 10/13/20 1314   BP: (!) 142/89 (!) 152/80   Site: Right Wrist Left Wrist   Pulse: 68    Resp: 16    SpO2: 96%    Weight: 287 lb 9.6 oz (130.5 kg)    Height: 6' 1\" (1.854 m)      Constitutional: This is a well developed, well nourished, 35-39.9 - Obesity Grade II 45y.o. year old male who is alert, oriented, cooperative and in no apparent distress. Head:normocephalic and atraumatic. EENT:  JOANN. No conjunctival injections. Septum was midline, mucosa was without erythema, exudates or cobblestoning. No thrush was noted. MallampatiIII (soft palate, base of uvula visible)  Neck: Supple without thyromegaly. No elevated JVP. Trachea was midline. Respiratory: Chest was symmetrical without dullness to percussion. Breath sounds bilaterally were clear to auscultation. There were no wheezes, rhonchi or rales. There is no intercostal retraction or use of accessory muscles. No egophony noted. Cardiovascular: Regular without murmur, clicks, gallops or rubs. Abdomen: Slightly rounded and soft without organomegaly. No rebound, rigidity or guarding was appreciated.     Lymphatic: No lymphadenopathy. Musculoskeletal: Normal curvature of the spine. No gross muscle weakness. Extremities:  No lower extremity edema, ulcerations, tenderness, varicosities or erythema. Muscle size, tone and strength are normal.  No involuntary movements are noted. Skin:  Warm and dry. Good color, turgor and pigmentation. No lesions or scars. No cyanosis or clubbing  Neurological/Psychiatric: The patient's general behavior, level of consciousness, thought content and emotional status is normal.          DATA:     pft's-ordered        CXR: REVIEWED: In October 2019 without any acute problems      IMPRESSION:   1. BRIANNE (obstructive sleep apnea)    2. Obesity due to excess calories, unspecified obesity severity    3. Essential hypertension    4. Restless legs syndrome (RLS)    5. Smoking history    6. Chronic obstructive pulmonary disease, unspecified COPD type ([facility]                   PLAN:       Refills were provided-none  Patient was recommended to have prednisone and an antibiotic available for use during an exacerbation  Educated and clarified the medication use. Discussed use, benefit, and side effects of prescribed medications. Barriers to medication compliance addressed. Michael Richards received counseling on the following healthy behaviors: nutrition, exercise and medication adherence  Recommend flu vaccination in the fall annually. Patient had flu vaccination for the season  Recommendations given regarding pneumococcal vaccinations. Patient is up-to-date with vaccinations from pulmonary perspective. Maintain an active lifestyle. Continue smoking cessation. Recommend pulmonary rehabilitation. Patient was explained importance of pulmonary rehabilitation with regards to decreasing the hospitalization risk and also help with his dyspnea  Patient was educated on how to use the respiratory medications. All the questions that the patient and the family has had were answered to their satisfaction.   Home O2 evaluation was done. Supplemental oxygen was not needed  Pulmonary function tests were ordered. Chest x-ray was reviewed. After reviewing the patient's smoking history and his age patient does not meet the criteria for lung cancer screening. Polysomnogram with CPAP/BiPAP titration if needed. Weight loss was recommended and discussed. Recommended following good sleep hygiene instructions. Explained importance of compliance with treatment of sleep apnea. Pt is not to drive if sleepy. We'll see the patient back in 4 months or earlier if needed. Patient will call us if he is sick, so he can be seen sooner. Thank you for having us involved in the care of your patient. Please call us if you have any questions or concerns.               Michelle Bailon MD  10/15/2020 11:12 AM

## 2020-10-15 NOTE — PROGRESS NOTES
[facility]   [address]  [address]  Dept: [phone]  Dept Fax: [phone]      10/15/20    Patient: Arnold Guzman  YOB: 1970    Dear Sue Gentile, APRN - CNP,    I had the pleasure of seeing one of your patients, PUNEET MELARA today in the office today. Please find attached my note with the assessment and plan of care. Thank you for allowing me to participate in the care of this patient. I will keep you updated on this patient's follow up and I look forward to serving you and your patients again in the future.     David Lea MD  10/15/2020 11:12 AM

## 2020-11-02 ENCOUNTER — HOSPITAL ENCOUNTER (OUTPATIENT)
Dept: SLEEP CENTER | Age: 50
Discharge: HOME | End: 2020-11-04
Payer: MEDICARE

## 2020-11-02 PROCEDURE — 95810 POLYSOM 6/> YRS 4/> PARAM: CPT

## 2020-11-03 VITALS
HEIGHT: 73 IN | RESPIRATION RATE: 20 BRPM | OXYGEN SATURATION: 92 % | WEIGHT: 290 LBS | BODY MASS INDEX: 38.43 KG/M2 | HEART RATE: 86 BPM | TEMPERATURE: 97.5 F

## 2020-11-03 ASSESSMENT — SLEEP AND FATIGUE QUESTIONNAIRES
HOW LIKELY ARE YOU TO NOD OFF OR FALL ASLEEP WHILE LYING DOWN TO REST IN THE AFTERNOON WHEN CIRCUMSTANCES PERMIT: 2
HOW LIKELY ARE YOU TO NOD OFF OR FALL ASLEEP WHILE WATCHING TV: 2
ESS TOTAL SCORE: 13
HOW LIKELY ARE YOU TO NOD OFF OR FALL ASLEEP WHILE SITTING AND TALKING TO SOMEONE: 1
HOW LIKELY ARE YOU TO NOD OFF OR FALL ASLEEP WHEN YOU ARE A PASSENGER IN A CAR FOR AN HOUR WITHOUT A BREAK: 3
HOW LIKELY ARE YOU TO NOD OFF OR FALL ASLEEP WHILE SITTING QUIETLY AFTER LUNCH WITHOUT ALCOHOL: 2
HOW LIKELY ARE YOU TO NOD OFF OR FALL ASLEEP IN A CAR, WHILE STOPPED FOR A FEW MINUTES IN TRAFFIC: 0
HOW LIKELY ARE YOU TO NOD OFF OR FALL ASLEEP WHILE SITTING INACTIVE IN A PUBLIC PLACE: 1
HOW LIKELY ARE YOU TO NOD OFF OR FALL ASLEEP WHILE SITTING AND READING: 2

## 2020-11-03 NOTE — PAYOR INFORMATION
Verified Demographics with Patient? No   If no why? Already verified  [employer] Completed? No    If not why? Already completed  Verified Insurance through: Already verified  [employer] Completed? Not required  Auth Verification #:[id number]  Liability Due: $0  Discount Offered: na%       Previous Balance: $ 0   Discount Offered: na%  Site Collect Status: no liability  Patient Response: no liability  Financial Aid Offered?  Yes Pt declined  Cards Scanned: yes

## 2020-11-06 NOTE — TELEPHONE ENCOUNTER
Michael Richards is calling to request a refill on the following medication(s):    Medication Request:  Requested Prescriptions     Pending Prescriptions Disp Refills    omeprazole (PRILOSEC) 20 MG delayed release capsule [Pharmacy Med Name: OMEPRAZOLE 20MG CAPSULE] 30 capsule      Sig: TAKE ONE CAPS BY MOUTH ONCE A DAY BEFORE BREAKFAST    atorvastatin (LIPITOR) 20 MG tablet [Pharmacy Med Name: ATORVASTATIN CALCIUM 20MG TABLET] 30 tablet 5     Sig: TAKE ONE TABLET BY MOUTH ONCE A DAY AT BEDTIME    insulin lispro, 1 Unit Dial, 100 UNIT/ML SOPN [Pharmacy Med Name: INSULIN LISPRO KWIKPEN 3MLX5] 3 mL      Sig: INJECT ONE TO 14 UNITS SUBCUTANEOUSLY THREE TIMES A DAY BEFORE MEALS PER SLIDING SCALE       Last Visit Date (If Applicable):  3/29/8304 in office    Next Visit Date:    3/22/2021

## 2020-11-09 RX ORDER — OMEPRAZOLE 20 MG/1
CAPSULE, DELAYED RELEASE ORAL
Qty: 30 CAPSULE | Refills: 5 | Status: SHIPPED | OUTPATIENT
Start: 2020-11-09 | End: 2021-04-15

## 2020-11-09 RX ORDER — CLOTRIMAZOLE AND BETAMETHASONE DIPROPIONATE 10; .64 MG/G; MG/G
CREAM TOPICAL
Qty: 90 G | Refills: 2 | Status: SHIPPED | OUTPATIENT
Start: 2020-11-09 | End: 2021-03-12

## 2020-11-09 RX ORDER — ATORVASTATIN CALCIUM 20 MG/1
TABLET, FILM COATED ORAL
Qty: 30 TABLET | Refills: 5 | Status: SHIPPED | OUTPATIENT
Start: 2020-11-09 | End: 2021-03-22

## 2020-11-09 RX ORDER — INSULIN LISPRO 100 [IU]/ML
INJECTION, SOLUTION INTRAVENOUS; SUBCUTANEOUS
Qty: 3 ML | Refills: 2 | OUTPATIENT
Start: 2020-11-09

## 2020-11-09 NOTE — TELEPHONE ENCOUNTER
Please call patient and inquire if he is still seeing endocrinology as that is the provider that should be prescribing the insulin.  If not, he will need sooner f/u and I will enter the Rx for insulin

## 2020-11-10 LAB — STATUS: NORMAL

## 2020-11-19 RX ORDER — AMITRIPTYLINE HYDROCHLORIDE 25 MG/1
TABLET, FILM COATED ORAL
Qty: 120 TABLET | Refills: 0 | Status: SHIPPED | OUTPATIENT
Start: 2020-11-19 | End: 2020-12-15 | Stop reason: SDUPTHER

## 2020-11-19 NOTE — TELEPHONE ENCOUNTER
Pharmacy requesting a  refill of AMITRIPTYLINE 25MG.       Medication active on med list yes      Date of last prescription 08/04/2020  with 3 refills verified on 11/19/2020    verified by SHAMA ALVARADO      Date of last appointment 08/04/2020    Next Visit Date:  12/15/2020

## 2020-12-03 ENCOUNTER — HOSPITAL ENCOUNTER (OUTPATIENT)
Dept: PREADMISSION TESTING | Age: 50
Discharge: HOME | End: 2020-12-07
Payer: MEDICARE

## 2020-12-03 PROCEDURE — U0003 INFECTIOUS AGENT DETECTION BY NUCLEIC ACID (DNA OR RNA); SEVERE ACUTE RESPIRATORY SYNDROME CORONAVIRUS 2 (SARS-COV-2) (CORONAVIRUS DISEASE [COVID-19]), AMPLIFIED PROBE TECHNIQUE, MAKING USE OF HIGH THROUGHPUT TECHNOLOGIES AS DESCRIBED BY CMS-2020-01-R: HCPCS

## 2020-12-05 LAB — SARS-COV-2, NAA: NOT DETECTED

## 2020-12-07 ENCOUNTER — HOSPITAL ENCOUNTER (OUTPATIENT)
Dept: SLEEP CENTER | Age: 50
Discharge: HOME | End: 2020-12-09
Payer: MEDICARE

## 2020-12-07 PROCEDURE — 95811 POLYSOM 6/>YRS CPAP 4/> PARM: CPT

## 2020-12-08 VITALS
RESPIRATION RATE: 20 BRPM | HEIGHT: 73 IN | HEART RATE: 86 BPM | TEMPERATURE: 97.6 F | BODY MASS INDEX: 38.43 KG/M2 | WEIGHT: 290 LBS | OXYGEN SATURATION: 93 %

## 2020-12-10 ENCOUNTER — OFFICE VISIT (OUTPATIENT)
Dept: PODIATRY | Age: 50
End: 2020-12-10
Payer: MEDICARE

## 2020-12-10 VITALS — RESPIRATION RATE: 16 BRPM | TEMPERATURE: 98.2 F | BODY MASS INDEX: 38.43 KG/M2 | WEIGHT: 290 LBS | HEIGHT: 73 IN

## 2020-12-10 PROCEDURE — 99999 PR OFFICE/OUTPT VISIT,PROCEDURE ONLY: CPT | Performed by: PODIATRIST

## 2020-12-10 PROCEDURE — 17110 DESTRUCTION B9 LES UP TO 14: CPT | Performed by: PODIATRIST

## 2020-12-10 PROCEDURE — 11721 DEBRIDE NAIL 6 OR MORE: CPT | Performed by: PODIATRIST

## 2020-12-14 NOTE — PROGRESS NOTES
Constitutional [x] Weight loss/gain   [] Fatigue  [] Fever/Chills   HEENT [] Hearing Loss  [] Visual Disturbance  [] Tinnitus  [] Eye pain   Respiratory [] Shortness of Breath  [] Cough  [] Snoring   Cardiovascular [] Chest Pain  [] Palpitations  [] Lightheaded   GI [] Constipation  [] Diarrhea  [] Swallowing change  [] Nausea/vomiting    [] Urinary Frequency  [] Urinary Urgency   Musculoskeletal [] Neck pain  [x] Back pain  [] Muscle pain  [] Restless legs   Dermatologic [] Skin changes   Neurologic [] Memory loss/confusion  [] Seizures  [] Trouble walking or imbalance  [x] Dizziness  [] Sleep disturbance  [] Weakness  [] Numbness  [] Tremors  [] Speech Difficulty  [] Headaches  [] Light Sensitivity  [] Sound Sensitivity   Endocrinology []Excessive thirst  []Excessive hunger   Psychiatric [] Anxiety/Depression  [] Hallucination   Allergy/immunology []Hives/environmental allergies   Hematologic/lymph [] Abnormal bleeding  [] Abnormal bruising

## 2020-12-15 ENCOUNTER — TELEMEDICINE (OUTPATIENT)
Dept: NEUROLOGY | Age: 50
End: 2020-12-15
Payer: MEDICARE

## 2020-12-15 LAB — STATUS: NORMAL

## 2020-12-15 PROCEDURE — 99214 OFFICE O/P EST MOD 30 MIN: CPT | Performed by: PSYCHIATRY & NEUROLOGY

## 2020-12-15 PROCEDURE — 3044F HG A1C LEVEL LT 7.0%: CPT | Performed by: PSYCHIATRY & NEUROLOGY

## 2020-12-15 PROCEDURE — 2022F DILAT RTA XM EVC RTNOPTHY: CPT | Performed by: PSYCHIATRY & NEUROLOGY

## 2020-12-15 PROCEDURE — G8427 DOCREV CUR MEDS BY ELIG CLIN: HCPCS | Performed by: PSYCHIATRY & NEUROLOGY

## 2020-12-15 PROCEDURE — 3017F COLORECTAL CA SCREEN DOC REV: CPT | Performed by: PSYCHIATRY & NEUROLOGY

## 2020-12-15 RX ORDER — AMITRIPTYLINE HYDROCHLORIDE 25 MG/1
TABLET, FILM COATED ORAL
Qty: 60 TABLET | Refills: 0 | Status: SHIPPED | OUTPATIENT
Start: 2020-12-15 | End: 2021-02-01

## 2020-12-15 NOTE — PROGRESS NOTES
[facility] Neurology Telehealth Followup Visit    Pt Name: Yasmeen Chauhan  MRN: [id number]  YOB: 1970  Date of evaluation: 12/14/2020  Last visit date: 8/4/2020    Primary Care Physician: BRENNON Oates CNP  Reason for Evaluation: TELEHEALTH EVALUATION -- Audio/Visual (During [id number] public health emergency)      Dear DEVAUGHN Barboza CNP    I saw Mr. Yasmeen Chauhan in virtual visit follow-up today in continuation of neurologic care. As you know he  is a 45 y.o. right handed  male with diabetic peripheral polyneuropathy with superimposed digna CTS. He had Rt cubital, Guyons canal and carpal tunnel release on 5/28/2020 with residual symptoms in right hand and he is \"okay with it\". But regarding peripheral polyneuropathy in lower extremities; he still has ongoing pins-and-needles sensations in lower extremities did not get any better with amitriptyline 50mg qpm. But he could not tolerate any dose higher than 50 mg nighlty as it made him \"drunk\". He tried Capsaicin cream and could not tolerate it. Afterwards, he visited pain clinic and he was on liquid preparation med, ? Topical solution with some relief. He also stated that he he was seen by chiropractor and then had MRI lumbar spine and it was abnormal for which he was subsequently referred to neuosurgeon for \"pinched nerve\". He did not want any discogram or surgical interventions. He wants to continue chiropractor interventions at this point of time. Neuropathy described as significantly abnormal and painful sensations with numbness and tingling in fingers along with stinging sensation and cramps in both calves. He has restless legs with intermittent myoclonic jerks through the night. He also has been having significant gait difficulties with \"occasional right leg giving out\". Taking smaller dose of Sinemet with the partial relief.       He has ongoing dysesthesias in bilateral lower extremities with marginal relief with amitriptyline. Increasing the dose of amitriptyline is making him lethargic. He is taking 50 mg every night. He was borderline diabetic since 2014 or so. He is on multiple diabetic medications including insulin, Victoza, etc.  His blood sugars had been under control but his symptoms had been getting worse. He has had NCS/EMG testing of lower extremities and he was told to have neuropathy. He was on gabapentin 300 mg 3 times daily and it has caused increased sleepiness and he stopped taking it. He also tried Lyrica and Cymbalta with no help. He has been having stinging sensation along with tingling and numbness in lower legs and feet. He also has been having painful sensations in both hands and those are described as clue-like sensation in medial aspect of both forearms and last 2 digits. He has been having trouble walking with increased pain upon walking. He denies radiating pain and paresthesias across the lower back. Denies bladder and bowel incontinence. He has been having extreme difficulty walking with unsteady gait with progressive pain in bilateral lower extremities. Patient states he is getting a compound cream from [employer] (gabapentin 10%, ketoprofen 10%, lidocaine 2%, prilocaine 2% solution) to be applied 2-5 drops to area of pain up to 4 times a day prn    Review of systems done and pertinent positives include numbness, weakness, spasms, pain in both legs, unsteady gait, back pain and restless legs as stated above.     Current Outpatient Medications on File Prior to Visit   Medication Sig Dispense Refill    amitriptyline (ELAVIL) 25 MG tablet TAKE TWO TABLETS BY MOUTH ONCE A DAY AT BEDTIME 120 tablet 0    omeprazole (PRILOSEC) 20 MG delayed release capsule TAKE ONE CAPS BY MOUTH ONCE A DAY BEFORE BREAKFAST 30 capsule 5    atorvastatin (LIPITOR) 20 MG tablet TAKE ONE TABLET BY MOUTH ONCE A DAY AT BEDTIME 30 tablet 5    clotrimazole-betamethasone (LOTRISONE) 1-0.05 % cream (COMFORT EZ PEN NEEDLES) 32G X 4 MM MISC Use 5 times daily. Change needle each time with Victoza, Basaglar, and Humalog 100 each 11    ammonium lactate (AMLACTIN) 12 % cream APPLY TOPICALLY AS NEEDED. (Patient taking differently: Apply topically as needed Apply topically as needed.) 280 g 2    Lancet Devices (SIMPLE DIAGNOSTICS LANCING DEV) MISC USE TO TEST BLOOD SUGAR 4 TIMES A DAY 1 each 3    TRUE METRIX BLOOD GLUCOSE TEST strip TEST BLOOD SUGAR 3 TO 4 TIMES DAILY 100 each 1    PHARMACIST CHOICE ALCOHOL 70 % PADS USE BEFORE TESTING SUGAR (4 TIMES) PLUS BEFORE USING INSULIN (5 TIMES A DAY) 270 each 11     No current facility-administered medications on file prior to visit. Allergies: Bib Echols is allergic to keflex [cephalexin]; codeine; corticosteroids; doxycycline; ibuprofen; and metformin and related. Past Medical History:   Diagnosis Date    Bundle branch block, left     [facility]; last visit (virtual) April 2020    Diabetes [facility])     GERD (gastroesophageal reflux disease)     History of echocardiogram 2016    [facility]; [facility]    Hyperlipidemia     Hypertension     PCP COLE Ponce NP, seen on 1/5/2020    Kidney calculi     Lumbar radiculopathy 6/9/2017    Non-ischemic cardiomyopathy ([address]     Obstructive sleep apnea syndrome 06/09/2017    No machine    PONV (postoperative nausea and vomiting)     needs scop patch for OR    Prolonged emergence from general anesthesia     Restless legs syndrome 6/9/2017    Severe obesity (BMI 35.0-39. 9) with comorbidity ([address] 7/31/2018    Wears dentures     Upper & lower       Past Surgical History:   Procedure Laterality Date    ANKLE SURGERY Right     ORIF    CARDIAC CATHETERIZATION  01/2020    \"at a [facility]\";   No blockages    CARPAL TUNNEL RELEASE Right 05/28/2020    CUBITAL, GUYONS CANAL AND CARPAL TUNNEL RELEASE     CARPAL TUNNEL RELEASE Right 5/28/2020    CUBITAL, GUYONS CANAL AND CARPAL TUNNEL RELEASE performed by Sharyle Bays, DO at [facility]    No polyps    GLAUCOMA SURGERY Bilateral 12/2018    HC INJECTION PROCEDURE FOR SACROILIAC JOINT Bilateral 2/21/2020    SACROILIAC JOINT INJECTION performed by Shari Nuñez MD at [facility] Right     SINUS SURGERY       Social History: Noelle Riley  reports that he quit smoking about 10 years ago. His smoking use included cigarettes. He started smoking about 33 years ago. He has a 60.00 pack-year smoking history. He has never used smokeless tobacco. He reports current alcohol use. He reports previous drug use. Family History   Problem Relation Age of Onset    High Cholesterol Mother     Depression Mother     Heart Disease Father         stents    High Cholesterol Father     Alcohol Abuse Father     Heart Surgery Father         CABG    Heart Attack Father     Lung Cancer Father         primary, transferred to the bones    Dementia Father     Diabetes Maternal Grandmother     Diabetes Maternal Grandfather     No Known Problems Paternal Grandmother     No Known Problems Paternal Grandfather     No Known Problems Half-Sister     No Known Problems Half-Brother     Thyroid Disease Daughter      On exam: He has checked his blood pressure this morning and it was 143/90 and blood sugar 120. Weight 290 LB.       NEUROLOGIC EXAMINATION  GENERAL  Appears comfortable and in no distress   HEENT  NC/ AT   NECK  Supple    MENTAL STATUS:  Alert, oriented, intact memory, no confusion, normal speech, normal language, no hallucination or delusion; appropriate affect   CRANIAL NERVES: II     -      PERRLA   III,IV,VI -  EOMs full, no KRISTINE, no ptosis  V     -     Unable to perform  VII    -     Normal facial symmetry  VIII   -     Intact hearing  IX,X -     unable to perform  XI    -     Symmetrical shoulder shrug  XII   -     Midline tongue, no atrophy    MOTOR FUNCTION:  significant for no visible weakness in marginal relief with amitriptyline 50 mg nightly; couldn't tolerate any higher dose of amitriptyline; will continue the same dose; also to send a script for Tizanidine 4mg qhs. He is also using compounded cream from [facility] as per pain clinic. Regarding treatment for diabetic peripheral polyneuropathy; so far there are no disease modifying therapies approved for diabetic neuropathy. Treatment of modifiable risk factors has been shown to limit disease progression in most of the patients. The major goals of care therefore include reduction of modifiable risk factors such as hyperglycemia, hyperlipidemia, hypertension, obesity and tobacco abuse. Optimal types of exercise and specific dietary recommendations may improve outcomes. Lipid panel is requested. Increasing HDL and decreasing triglycerides would certainly help. Abnormal lumbosacral spine MRI; evaluated by neurosurgeon Dr. Lyla Maria; patient wants to proceed with chiropracter maneuvers at this point of time.       Restless leg syndrome sec to diabetic peripheral polyneuropathy; periodic leg movements in sleep; inadequately controlled on low-dose of Sinemet; will increase the dose from half tab to full tab Sinemet 25/100 bid at 8 pm- 11 pm     Meds tried for neuropathic pain relief:  Failed gabapentin, Lyrica and duloxetine; couldn't tolerate higher doses of Elavil    Follow-up in 3-4 months. Please note that portions of this note were completed with a voice recognition program.  Although every effort was made to ensure the accuracy of this automated transcription, some errors in transcription may have occurred; occasionally words are mis-transcribed. Thank you for understanding. This is a telehealth visit that was performed with the originating site at Patient Location: Patient Home and Provider Location of [place], [place]. Patient ID verified by me prior to start of this visit.   Verbal consent to participate in video visit was obtained. Pursuant to the emergency declaration under the [id number] waiver authority and the [employer] Act, this Virtual Visit was conducted, with patient's consent, to reduce the patient's risk of exposure to COVID-19 and provide continuity of care for an established/new patient. Complete and detailed physical examination is not feasible during this virtual video visit and patient is agreeable and understood. Services were provided through a video synchronous discussion virtually to substitute for in-person clinic visit. I discussed with the patient the nature of our telehealth visits via interactive/real-time audio/video that:  - I would evaluate the patient and recommend diagnostics and treatments based on my assessment  - Our sessions are not being recorded and that personal health information is protected  - Our team would provide follow up care in person if/when the patient needs it.

## 2021-01-18 NOTE — TELEPHONE ENCOUNTER
Henry Adkins is calling to request a refill on the following medication(s):    Medication Request:  Requested Prescriptions     Pending Prescriptions Disp Refills    [facility] [Pharmacy Med Name: Palomo Khan 30 G] 600 each 0     Sig: USE TO TEST BLOOD SUGAR 4 TIMES A DAY       Last Visit Date (If Applicable):  6/66/9843 In office    Next Visit Date:    3/22/2021

## 2021-01-19 RX ORDER — GLUCOSAM/CHON-MSM1/C/MANG/BOSW 500-416.6
TABLET ORAL
Qty: 600 EACH | Refills: 0 | Status: SHIPPED | OUTPATIENT
Start: 2021-01-19

## 2021-02-01 DIAGNOSIS — E11.42 DIABETIC POLYNEUROPATHY ASSOCIATED WITH TYPE 2 DIABETES MELLITUS (HCC): ICD-10-CM

## 2021-02-01 DIAGNOSIS — G57.93 NEUROPATHIC PAIN OF BOTH LEGS: ICD-10-CM

## 2021-02-01 RX ORDER — AMITRIPTYLINE HYDROCHLORIDE 25 MG/1
TABLET, FILM COATED ORAL
Qty: 60 TABLET | Refills: 2 | Status: SHIPPED | OUTPATIENT
Start: 2021-02-01 | End: 2021-04-15

## 2021-02-01 NOTE — TELEPHONE ENCOUNTER
Pharmacy requesting refill of Amitriptyline.       Medication active on med list: yes      Date of last fill: 12/15/2020    verified on 2/1/2021    verified by Aurelio Martinez LPN      Date of last appointment 12/15/2020    Next Visit Date:  4/15/2021

## 2021-02-10 ENCOUNTER — VIRTUAL VISIT (OUTPATIENT)
Dept: PULMONOLOGY | Age: 51
End: 2021-02-10
Payer: MEDICARE

## 2021-02-10 DIAGNOSIS — Z87.891 SMOKING HISTORY: ICD-10-CM

## 2021-02-10 DIAGNOSIS — E66.09 OBESITY DUE TO EXCESS CALORIES, UNSPECIFIED OBESITY SEVERITY: ICD-10-CM

## 2021-02-10 DIAGNOSIS — J44.9 CHRONIC OBSTRUCTIVE PULMONARY DISEASE, UNSPECIFIED COPD TYPE (HCC): ICD-10-CM

## 2021-02-10 DIAGNOSIS — I10 ESSENTIAL HYPERTENSION: ICD-10-CM

## 2021-02-10 DIAGNOSIS — G47.33 OSA (OBSTRUCTIVE SLEEP APNEA): Primary | ICD-10-CM

## 2021-02-10 DIAGNOSIS — G25.81 RESTLESS LEGS SYNDROME (RLS): ICD-10-CM

## 2021-02-10 PROCEDURE — 99214 OFFICE O/P EST MOD 30 MIN: CPT | Performed by: INTERNAL MEDICINE

## 2021-02-10 PROCEDURE — 3017F COLORECTAL CA SCREEN DOC REV: CPT | Performed by: INTERNAL MEDICINE

## 2021-02-10 PROCEDURE — G8427 DOCREV CUR MEDS BY ELIG CLIN: HCPCS | Performed by: INTERNAL MEDICINE

## 2021-02-10 ASSESSMENT — SLEEP AND FATIGUE QUESTIONNAIRES
HOW LIKELY ARE YOU TO NOD OFF OR FALL ASLEEP WHILE LYING DOWN TO REST IN THE AFTERNOON WHEN CIRCUMSTANCES PERMIT: 3
HOW LIKELY ARE YOU TO NOD OFF OR FALL ASLEEP WHILE WATCHING TV: 1
HOW LIKELY ARE YOU TO NOD OFF OR FALL ASLEEP WHEN YOU ARE A PASSENGER IN A CAR FOR AN HOUR WITHOUT A BREAK: 0
HOW LIKELY ARE YOU TO NOD OFF OR FALL ASLEEP WHILE SITTING INACTIVE IN A PUBLIC PLACE: 1
ESS TOTAL SCORE: 10

## 2021-02-11 ENCOUNTER — OFFICE VISIT (OUTPATIENT)
Dept: PODIATRY | Age: 51
End: 2021-02-11
Payer: MEDICARE

## 2021-02-11 VITALS — WEIGHT: 290 LBS | TEMPERATURE: 97.7 F | RESPIRATION RATE: 18 BRPM | HEIGHT: 73 IN | BODY MASS INDEX: 38.43 KG/M2

## 2021-02-11 DIAGNOSIS — I73.9 PERIPHERAL VASCULAR DISORDER (HCC): ICD-10-CM

## 2021-02-11 DIAGNOSIS — D23.71 BENIGN NEOPLASM OF SKIN OF LOWER LIMB, INCLUDING HIP, RIGHT: ICD-10-CM

## 2021-02-11 DIAGNOSIS — M79.672 BILATERAL FOOT PAIN: ICD-10-CM

## 2021-02-11 DIAGNOSIS — M79.671 BILATERAL FOOT PAIN: ICD-10-CM

## 2021-02-11 DIAGNOSIS — B35.1 DERMATOPHYTOSIS OF NAIL: ICD-10-CM

## 2021-02-11 DIAGNOSIS — E11.51 TYPE II DIABETES MELLITUS WITH PERIPHERAL CIRCULATORY DISORDER (HCC): Primary | ICD-10-CM

## 2021-02-11 DIAGNOSIS — D23.72 BENIGN NEOPLASM OF SKIN OF LOWER LIMB, INCLUDING HIP, LEFT: ICD-10-CM

## 2021-02-11 PROCEDURE — 11721 DEBRIDE NAIL 6 OR MORE: CPT | Performed by: PODIATRIST

## 2021-02-11 PROCEDURE — 99999 PR OFFICE/OUTPT VISIT,PROCEDURE ONLY: CPT | Performed by: PODIATRIST

## 2021-02-11 PROCEDURE — 17110 DESTRUCTION B9 LES UP TO 14: CPT | Performed by: PODIATRIST

## 2021-02-11 NOTE — PROGRESS NOTES
[facility] PHYSICIANS  [facility]  [address] [place] [address]  Dept: [phone]  Dept Fax: [phone]    DIABETIC PROGRESS NOTE  Date of patient's visit: 2/11/2021  Patient's Name:  Kunal Lockhart YOB: 1970            Patient Care Team:  BRENNON Castillo CNP as PCP - General (Nurse Practitioner)  BRENNON Castillo CNP as PCP - [facility]  Roseanne Bell DPM as Physician (Podiatry)  Dara Lambert DPM as Physician (Podiatry)  Lucia Carlton MD as Consulting Physician (Pulmonary Disease)          Chief Complaint   Patient presents with    Diabetes    Peripheral Neuropathy    Foot Pain    Nail Problem    Benign Neoplasm       Subjective:   Kunal Lockhart comes to clinic for Diabetes, Peripheral Neuropathy, Foot Pain, Nail Problem, and Benign Neoplasm    he is a diabetic and states that he is doing well. Pt currently has complaint of thickened, elongated nails that they cannot manage by themselves. Pt's primary care physician is BRENNON Castillo CNP last seen 09/22/2021. Pt's last blood sugar was 121 . Pt also relates to painful skin spots to the bottom of both feet. Pt has tried pads and changing shoes but it has note helped the pain    Pt has a new complaint of NONE. Lab Results   Component Value Date    LABA1C 5.7 09/22/2020      Complains of numbness in the feet bilat.   Past Medical History:   Diagnosis Date    Bundle branch block, left     [facility]; last visit (virtual) April 2020    Diabetes [facility])     Diabetes mellitus ([address] 6/9/2017    Diabetic polyneuropathy associated with type 2 diabetes mellitus ([address] 5/4/2020    Gait difficulty 5/4/2020    GERD (gastroesophageal reflux disease)     History of echocardiogram 2016    [facility]; [facility]    Hyperlipidemia     Hypertension     PCP Jessa Bustillo NP, seen on 1/5/2020    Kidney calculi     Lumbar radiculopathy 6/9/2017    Mass of right elbow 5/28/2020    Muscle spasms of both lower extremities 5/4/2020    Neuropathic pain of both legs 5/4/2020    Non-ischemic cardiomyopathy (HCC)     Numbness and tingling in both hands 5/29/2019    Obstructive sleep apnea syndrome 06/09/2017    No machine    PONV (postoperative nausea and vomiting)     needs scop patch for OR    Prolonged emergence from general anesthesia     Restless legs syndrome 6/9/2017    Restless legs syndrome (RLS) 6/9/2017    Right median nerve neuropathy 5/28/2020    Severe obesity (BMI 35.0-39. 9) with comorbidity ([address] 7/31/2018    Ulnar neuropathy of right upper extremity 5/28/2020    Wears dentures     Upper & lower       Allergies   Allergen Reactions    Keflex [Cephalexin] Diarrhea    Codeine Itching    Corticosteroids Swelling     Injection site swelling    Doxycycline      GERD    Ibuprofen Other (See Comments)     GERD    Metformin And Related Diarrhea     Current Outpatient Medications on File Prior to Visit   Medication Sig Dispense Refill    amitriptyline (ELAVIL) 25 MG tablet TAKE TWO TABLETS BY MOUTH ONCE A DAY AT BEDTIME 60 tablet 2    TRUEplus Lancets 30G MISC USE TO TEST BLOOD SUGAR 4 TIMES A  each 0    carbidopa-levodopa (SINEMET)  MG per tablet TAKE 1 TABLET BY MOUTH AT 8PM AND 11PM 60 tablet 6    omeprazole (PRILOSEC) 20 MG delayed release capsule TAKE ONE CAPS BY MOUTH ONCE A DAY BEFORE BREAKFAST 30 capsule 5    atorvastatin (LIPITOR) 20 MG tablet TAKE ONE TABLET BY MOUTH ONCE A DAY AT BEDTIME 30 tablet 5    clotrimazole-betamethasone (LOTRISONE) 1-0.05 % cream APPLY TOPICALLY TWICE A DAY 90 g 2    tiZANidine (ZANAFLEX) 4 MG tablet Take one tab nighly 30 tablet 6    zinc oxide 20 % ointment MIX WITH KETOCONAZOLE 1:1 AND APPLY TO GROIN CREASES NIGHTLY 60 g 3    ketoconazole (NIZORAL) 2 % cream MIX WITH ZINC OXIDE 1:1 AND APPLY TO GROIN CREASES NIGHTLY 60 g 2    ASPIRIN LOW DOSE 81 MG EC tablet Take 1 tablet by mouth daily      latanoprost (XALATAN) 0.005 % ophthalmic solution Place 1 drop into both eyes daily      BENZOYL PEROXIDE 5 % external wash WASH CHEST AND BACK 1 TO 2 TIMES DAILY 227 g 3    Misc. Devices MISC 1 PAIR OF DIABETIC SHOES (1 LEFT/ 1 RIGHT)  1-3 PAIRS OF INSERTS (LEFT/ RIGHT) 2 each 0    vitamin D (ERGOCALCIFEROL) 1.25 MG (37291 UT) CAPS capsule Take 1 capsule by mouth once a week      GLUCAGON EMERGENCY 1 MG injection       isosorbide mononitrate (IMDUR) 30 MG extended release tablet Take 1 tablet by mouth daily      HYDRALAZINE HCL PO Take 50 mg by mouth daily      Alcohol Swabs (B-D SINGLE USE SWABS REGULAR) PADS USE BEFORE TESTING 4 TIMES A DAY PLUS BEFORE USING INSULIN (5 TIMES A DAY) 365 each 2    lisinopril (PRINIVIL;ZESTRIL) 5 MG tablet Take 5 mg by mouth nightly       metoprolol succinate (TOPROL XL) 25 MG extended release tablet Take 25 mg by mouth daily      nystatin (MYCOSTATIN) 273070 UNIT/GM cream Apply topically 2 times daily. 15 g 3    Semaglutide (OZEMPIC, 1 MG/DOSE, SC) Inject 1 mg into the skin once a week Every Sunday      Sulfacetamide Sodium 9.8 % LOTN Apply to nose and cheeks twice daily (Patient taking differently: Apply topically as needed Apply to nose and cheeks twice daily) 113 g 3    blood glucose monitor strips Test 4 times a day & as needed for symptoms of irregular blood glucose. 120 strip 3    Insulin Pen Needle (COMFORT EZ PEN NEEDLES) 32G X 4 MM MISC Use 5 times daily. Change needle each time with Victoza, Basaglar, and Humalog 100 each 11    ammonium lactate (AMLACTIN) 12 % cream APPLY TOPICALLY AS NEEDED.  (Patient taking differently: Apply topically as needed Apply topically as needed.) 280 g 2    Lancet Devices (SIMPLE DIAGNOSTICS LANCING DEV) MISC USE TO TEST BLOOD SUGAR 4 TIMES A DAY 1 each 3    TRUE METRIX BLOOD GLUCOSE TEST strip TEST BLOOD SUGAR 3 TO 4 TIMES DAILY 100 each 1    PHARMACIST CHOICE ALCOHOL 70 % PADS USE BEFORE TESTING SUGAR (4 TIMES) PLUS BEFORE USING INSULIN (5 TIMES A DAY) 270 each 11     No current facility-administered medications on file prior to visit. Review of Systems    Review of Systems:   History obtained from chart review and the patient  General ROS: negative for - chills, fatigue, fever, night sweats or weight gain  Constitutional: Negative for chills, diaphoresis, fatigue, fever and unexpected weight change. Musculoskeletal: Positive for arthralgias, gait problem and joint swelling. Neurological ROS: negative for - behavioral changes, confusion, headaches or seizures. Negative for weakness and numbness. Dermatological ROS: negative for - mole changes, rash  Cardiovascular: Negative for leg swelling. Gastrointestinal: Negative for constipation, diarrhea, nausea and vomiting. Objective:  Dermatologic Exam:  Skin lesion present to the right and left plantar foot with a central core and petchaie noted to the lesions periphery. Pain on palpation of the lesion    .    Skin is thin, with flaky sloughing skin as well as decreased hair growth to the lower leg  Small red hemosiderin deposits seen dorsal foot   Musculoskeletal:     1st MPJ ROM decreased, Bilateral.  Muscle strength 5/5, Bilateral.  Pain present upon palpation of toenails 1-5, Bilateral. decreased medial longitudinal arch, Bilateral.  Ankle ROM decreased,Bilateral.    Dorsally contracted digits present digits 2, Bilateral.     Vascular: DP pulses 1/4 bilateral.  PT pulses 0/4 bilateral.   CFT <5 seconds, Bilateral.  Hair growth absent to the level of the digits, Bilateral.  Edema present, Bilateral.  Varicosities absent, Bilateral. Erythema absent, Bilateral    Neurological: Sensation diminshed to light touch to level of digits, Bilateral.  Protective sensation intact 6/10 sites via 5.07/10g [place] Monofilament, Bilateral.  negative Tinel's, Bilateral.  negative Valleix sign, Bilateral.      Integument: Warm, dry, supple, Bilateral.  Open lesion absent, Bilateral.  Interdigital maceration absent to web spaces 4, Bilateral.  Nails 1-5 left and 1-5 right thickened > 3.0 mm, dystrophic and crumbly, discolored with subungual debris. Fissures absent, Bilateral.   General: AAO x 3 in NAD. Derm  Toenail Description  Sites of Onychomycosis Involvement (Check affected area)  [x] [x] [x] [x] [x] [x] [x] [x] [x] [x]  5 4 3 2 1 1 2 3 4 5                          Right                                        Left    Thickness  [x] [x] [x] [x] [x] [x] [x] [x] [x] [x]  5 4 3 2 1 1 2 3 4 5                         Right                                        Left    Dystrophic Changes   [x] [x] [x] [x] [x] [x] [x] [x] [x] [x]  5 4 3 2 1 1 2 3 4 5                         Right                                        Left    Color   [x] [x] [x] [x] [x] [x] [x] [x] [x] [x]  5 4 3 2 1 1 2 3 4 5                          Right                                        Left    Incurvation/Ingrowin   [] [] [] [] [] [] [] [] [] []  5 4 3 2 1 1 2 3 4 5                         Right                                        Left    Inflammation/Pain   [x] [x] [x] [x] [x] [x] [x] [x] [x] [x]  5 4 3 2 1 1 2 3 4 5                         Right                                        Left        Visual inspection:  Deformity: hammertoe deformity digna feet  amputation: absent  Skin lesions: present - as above  Edema: right- 2+ pitting edema, left- 2+ pitting edema    Sensory exam:  Monofilament sensation: abnormal - 6/10 via SW 5.07/10g monofilament to the plantar foot bilateral feet    Pulses: abnormal - 1/4 dorsalis pedis pulse and 1/4 Posterior tibial pulse,   Pinprick: Impaired  Proprioception: Impaired  Vibration (128 Hz): Impaired       DM with PVD       [x]Yes    []No      Assessment:  45 y.o. male with:   Diagnosis Orders   1.  Type II diabetes mellitus with peripheral circulatory disorder (HCC)   DIABETES FOOT EXAM    07608 - SD DESTRUCTION BENIGN LESIONS UP TO 14    59808 - SD DEBRIDEMENT OF

## 2021-02-14 NOTE — PROGRESS NOTES
PULMONARY OP progress note        REFERRED BY: Derrell Lefort, APRN - CNP    REASON FOR CONSULTATION: Sleep apnea    Patient is being seen in follow-up for-  1. BRIANNE (obstructive sleep apnea)    2. Obesity due to excess calories, unspecified obesity severity    3. Essential hypertension    4. Restless legs syndrome (RLS)    5. Smoking history    6.  Chronic obstructive pulmonary disease, unspecified COPD type ([address]          HISTORY OF PRESENT ILLNESS:    Luz Kasper is a 45y.o. year old male here for evaluation of sleep apnea  Patient was known to have sleep apnea and diagnosed with about 10 years ago  Secondary to insurance restrictions, patient could not have a CPAP  Continues to have snoring with observed apnea with daytime sleepiness and fatigue and tiredness  No motor vehicle accidents  In addition to the symptoms with sleep apnea, patient does not have any narcolepsy symptoms nor hypothyroidism symptoms  Patient had a sleep study done since last visit that confirmed the diagnosis of sleep apnea and patient will benefit from BiPAP of 17/13 centimeters of water  Compliance data shows less than optimal compliance with good improvement with BiPAP pressure 17/30 centimeters of water with an AHI less than 1  The recorded compliance could be low because of the new machine and being used only for part-time in the month  He does have some shortness of breath upon exertion  It is class II dyspnea  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 and also 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  In addition to all these exposures patient has significant smoking history of about 60 pack years but quit smoking in 2010  Denied any shortness of breath or wheezing  Not much coughing or sputum production  No hemoptysis  No orthopnea or PND  No chest pain or pressure  No fever chills or night sweats  No weight loss or loss of appetite        PAST MEDICAL HISTORY:       Diagnosis Date    Bundle branch block, left     [facility]; last visit (virtual) April 2020    Diabetes [facility])     Diabetes mellitus ([facility] 6/9/2017    Diabetic polyneuropathy associated with type 2 diabetes mellitus ([facility] 5/4/2020    Gait difficulty 5/4/2020    GERD (gastroesophageal reflux disease)     History of echocardiogram 2016    [facility]; [facility]    Hyperlipidemia     Hypertension     PCP COLE Ponce NP, seen on 1/5/2020    Kidney calculi     Lumbar radiculopathy 6/9/2017    Mass of right elbow 5/28/2020    Muscle spasms of both lower extremities 5/4/2020    Neuropathic pain of both legs 5/4/2020    Non-ischemic cardiomyopathy (HCC)     Numbness and tingling in both hands 5/29/2019    Obstructive sleep apnea syndrome 06/09/2017    No machine    PONV (postoperative nausea and vomiting)     needs scop patch for OR    Prolonged emergence from general anesthesia     Restless legs syndrome 6/9/2017    Restless legs syndrome (RLS) 6/9/2017    Right median nerve neuropathy 5/28/2020    Severe obesity (BMI 35.0-39. 9) with comorbidity ([facility] 7/31/2018    Ulnar neuropathy of right upper extremity 5/28/2020    Wears dentures     Upper & lower       SURGICAL HISTORY:    Past Surgical History:   Procedure Laterality Date    ANKLE SURGERY Right     ORIF    CARDIAC CATHETERIZATION  01/2020    \"at a [facility]\";   No blockages    CARPAL TUNNEL RELEASE Right 05/28/2020    CUBITAL, GUYONS CANAL AND CARPAL TUNNEL RELEASE     CARPAL TUNNEL RELEASE Right 5/28/2020    CUBITAL, GUYONS CANAL AND CARPAL TUNNEL RELEASE performed by Kuldip Cordova DO at [facility]    No polyps    GLAUCOMA SURGERY Bilateral 12/2018    HC INJECTION PROCEDURE FOR SACROILIAC JOINT Bilateral 2/21/2020    SACROILIAC JOINT INJECTION performed by Angy Otero MD at [facility] Right     SINUS SURGERY         ALLERGIES:    Allergies   Allergen Reactions    Keflex lisinopril (PRINIVIL;ZESTRIL) 5 MG tablet Take 5 mg by mouth nightly       metoprolol succinate (TOPROL XL) 25 MG extended release tablet Take 25 mg by mouth daily      nystatin (MYCOSTATIN) 868132 UNIT/GM cream Apply topically 2 times daily. 15 g 3    Semaglutide (OZEMPIC, 1 MG/DOSE, SC) Inject 1 mg into the skin once a week Every Sunday      Sulfacetamide Sodium 9.8 % LOTN Apply to nose and cheeks twice daily (Patient taking differently: Apply topically as needed Apply to nose and cheeks twice daily) 113 g 3    blood glucose monitor strips Test 4 times a day & as needed for symptoms of irregular blood glucose. 120 strip 3    Insulin Pen Needle (COMFORT EZ PEN NEEDLES) 32G X 4 MM MISC Use 5 times daily. Change needle each time with Victoza, Basaglar, and Humalog 100 each 11    ammonium lactate (AMLACTIN) 12 % cream APPLY TOPICALLY AS NEEDED. (Patient taking differently: Apply topically as needed Apply topically as needed.) 280 g 2    Lancet Devices (SIMPLE DIAGNOSTICS LANCING DEV) MISC USE TO TEST BLOOD SUGAR 4 TIMES A DAY 1 each 3    TRUE METRIX BLOOD GLUCOSE TEST strip TEST BLOOD SUGAR 3 TO 4 TIMES DAILY 100 each 1    PHARMACIST CHOICE ALCOHOL 70 % PADS USE BEFORE TESTING SUGAR (4 TIMES) PLUS BEFORE USING INSULIN (5 TIMES A DAY) 270 each 11     No current facility-administered medications for this visit. FAMILY HISTORY: family history includes Alcohol Abuse in his father; Dementia in his father; Depression in his mother; Diabetes in his maternal grandfather and maternal grandmother; Heart Attack in his father; Heart Disease in his father; Heart Surgery in his father; High Cholesterol in his father and mother; Teola Hush in his father; No Known Problems in his half-brother, half-sister, paternal grandfather, and paternal grandmother; Thyroid Disease in his daughter. SOCIAL AND OCCUPATIONAL HEALTH:  The patient is a Past smoker of more than 30 pack years and quit smoking in 2010.   There  is not is a well developed, well nourished, 35-39.9 - Obesity Grade II 45y.o. year old male who is alert, oriented, cooperative and in no apparent distress. Head:normocephalic and atraumatic. EENT:  JOANN. No conjunctival injections. Septum was midline, mucosa was without erythema, exudates or cobblestoning. No thrush was noted. MallampatiIII (soft palate, base of uvula visible)  Neck: Supple without thyromegaly. No elevated JVP. Trachea was midline. Respiratory: Chest was symmetrical without dullness to percussion. Breath sounds bilaterally were clear to auscultation. There were no wheezes, rhonchi or rales. There is no intercostal retraction or use of accessory muscles. No egophony noted. Cardiovascular: Regular without murmur, clicks, gallops or rubs. Abdomen: Slightly rounded and soft without organomegaly. No rebound, rigidity or guarding was appreciated. Lymphatic: No lymphadenopathy. Musculoskeletal: Normal curvature of the spine. No gross muscle weakness. Extremities:  No lower extremity edema, ulcerations, tenderness, varicosities or erythema. Muscle size, tone and strength are normal.  No involuntary movements are noted. Skin:  Warm and dry. Good color, turgor and pigmentation. No lesions or scars. No cyanosis or clubbing  Neurological/Psychiatric: The patient's general behavior, level of consciousness, thought content and emotional status is normal.          DATA:     pft's-ordered    Polysomnogram showed mild sleep apnea that improved with BiPAP at a pressure of 17/13 centimeters of water    CXR: REVIEWED: In October 2019 without any acute problems      IMPRESSION:   1. BRIANNE (obstructive sleep apnea)    2. Obesity due to excess calories, unspecified obesity severity    3. Essential hypertension    4. Restless legs syndrome (RLS)    5. Smoking history    6.  Chronic obstructive pulmonary disease, unspecified COPD type ([facility]                   PLAN:       Refills were provided-patient was provided with BiPAP machine prescription since last visit and patient has been using it  Patient was informed about the sleep study and the diagnosis of sleep apnea and the management of sleep apnea and the importance of compliance  Patient was recommended to have prednisone and an antibiotic available for use during an exacerbation  Educated and clarified the medication use. Patient continues to have problem with restless legs. He has been taking Sinemet without much improvement. He is going to have it addressed by neurology but if no improvement he will contact me  Discussed use, benefit, and side effects of prescribed medications. Barriers to medication compliance addressed. Baron Montano received counseling on the following healthy behaviors: nutrition, exercise and medication adherence  Recommend flu vaccination in the fall annually. Patient had flu vaccination for the season  Recommendations given regarding pneumococcal vaccinations. Patient is up-to-date with vaccinations from pulmonary perspective. Maintain an active lifestyle. Continue smoking cessation. Recommend pulmonary rehabilitation. Patient was explained importance of pulmonary rehabilitation with regards to decreasing the hospitalization risk and also help with his dyspnea  Patient was educated on how to use the respiratory medications. All the questions that the patient and the family has had were answered to their satisfaction. Home O2 evaluation was done. Supplemental oxygen was not needed  Pulmonary function tests were ordered. Patient have it later on in the month  After reviewing the patient's smoking history and his age patient does not meet the criteria for lung cancer screening. BiPAP at a pressure of 17/13 centimeters of water to be used overnight for at least 4 hours  Weight loss was recommended and discussed. Recommended following good sleep hygiene instructions.   Explained importance of compliance with treatment of sleep apnea. Pt is not to drive if sleepy. We'll see the patient back in 4 months or earlier if needed. Patient will call us if he is sick, so he can be seen sooner. Thank you for having us involved in the care of your patient. Please call us if you have any questions or concerns.               Sacha Menjivar MD  2/13/2021 11:51 PM

## 2021-02-22 DIAGNOSIS — R26.9 GAIT DIFFICULTY: ICD-10-CM

## 2021-02-22 DIAGNOSIS — G25.81 RESTLESS LEGS SYNDROME: ICD-10-CM

## 2021-02-22 DIAGNOSIS — M62.838 MUSCLE SPASMS OF BOTH LOWER EXTREMITIES: ICD-10-CM

## 2021-02-22 DIAGNOSIS — E11.42 DIABETIC POLYNEUROPATHY ASSOCIATED WITH TYPE 2 DIABETES MELLITUS (HCC): ICD-10-CM

## 2021-02-23 NOTE — TELEPHONE ENCOUNTER
Pharmacy requesting refill of Tizanidine 4 mg and Sinemet  mg. Medication active on med list yes      Date last ordered: 8/4/2020 Tizanidine and 12/15/2020 Sinemet, but if failed to go to the pharmacy  verified on 2/23/2021  verified by MICHELLE Li LPN      Date of last appointment: 12/15/2020    Next Visit Date:  4/15/2021

## 2021-02-24 ENCOUNTER — OFFICE VISIT (OUTPATIENT)
Dept: FAMILY MEDICINE CLINIC | Age: 51
End: 2021-02-24
Payer: MEDICARE

## 2021-02-24 VITALS
HEART RATE: 88 BPM | SYSTOLIC BLOOD PRESSURE: 126 MMHG | BODY MASS INDEX: 40.11 KG/M2 | OXYGEN SATURATION: 95 % | TEMPERATURE: 98.1 F | DIASTOLIC BLOOD PRESSURE: 84 MMHG | WEIGHT: 304 LBS

## 2021-02-24 DIAGNOSIS — Z79.4 TYPE 2 DIABETES MELLITUS WITH HYPERGLYCEMIA, WITH LONG-TERM CURRENT USE OF INSULIN (HCC): ICD-10-CM

## 2021-02-24 DIAGNOSIS — R59.0 LYMPHADENOPATHY OF RIGHT CERVICAL REGION: Primary | ICD-10-CM

## 2021-02-24 DIAGNOSIS — E11.65 TYPE 2 DIABETES MELLITUS WITH HYPERGLYCEMIA, WITH LONG-TERM CURRENT USE OF INSULIN (HCC): ICD-10-CM

## 2021-02-24 LAB — HBA1C MFR BLD: 7.4 %

## 2021-02-24 PROCEDURE — 3051F HG A1C>EQUAL 7.0%<8.0%: CPT | Performed by: NURSE PRACTITIONER

## 2021-02-24 PROCEDURE — G8482 FLU IMMUNIZE ORDER/ADMIN: HCPCS | Performed by: NURSE PRACTITIONER

## 2021-02-24 PROCEDURE — 3017F COLORECTAL CA SCREEN DOC REV: CPT | Performed by: NURSE PRACTITIONER

## 2021-02-24 PROCEDURE — 83036 HEMOGLOBIN GLYCOSYLATED A1C: CPT | Performed by: NURSE PRACTITIONER

## 2021-02-24 PROCEDURE — G8427 DOCREV CUR MEDS BY ELIG CLIN: HCPCS | Performed by: NURSE PRACTITIONER

## 2021-02-24 PROCEDURE — 2022F DILAT RTA XM EVC RTNOPTHY: CPT | Performed by: NURSE PRACTITIONER

## 2021-02-24 PROCEDURE — 99213 OFFICE O/P EST LOW 20 MIN: CPT | Performed by: NURSE PRACTITIONER

## 2021-02-24 PROCEDURE — G8417 CALC BMI ABV UP PARAM F/U: HCPCS | Performed by: NURSE PRACTITIONER

## 2021-02-24 PROCEDURE — 1036F TOBACCO NON-USER: CPT | Performed by: NURSE PRACTITIONER

## 2021-02-24 RX ORDER — TIZANIDINE 4 MG/1
TABLET ORAL
Qty: 90 TABLET | Refills: 0 | Status: SHIPPED | OUTPATIENT
Start: 2021-02-24 | End: 2021-05-14

## 2021-02-24 ASSESSMENT — PATIENT HEALTH QUESTIONNAIRE - PHQ9
SUM OF ALL RESPONSES TO PHQ9 QUESTIONS 1 & 2: 0
SUM OF ALL RESPONSES TO PHQ QUESTIONS 1-9: 0
2. FEELING DOWN, DEPRESSED OR HOPELESS: 0

## 2021-02-24 NOTE — PROGRESS NOTES
ALEXEI Friend-PAM  [address]  [id number] [facility]. [place]  [address]  [phone]  [phone]    Beulah Meek is a 45 y.o. male who is here with c/o of:    Chief Complaint: Mass (bump on lt side of neck, noticed 2 weeks ago)      Patient Accompanied by: n/a    HPI - Beulah Meek is here today with c/o:    Lump to right neck that he noticed about 1 week ago  He denies pain to the area  He has not tried anything to relieve and there are no aggravating factors    DM   - patient is under the care of endocrinology for this problem   - he denies blurred vision, polyuria, polydipsia and has chronic neuropathy   - current med: ozempic 1mg weekly, insulin (unknown doses)          Patient Active Problem List:     Diabetes mellitus ([facility]     Hyperlipidemia     Hypertension     Lumbar radiculopathy     Obstructive sleep apnea syndrome     Restless legs syndrome (RLS)     Severe obesity (BMI 35.0-39. 9) with comorbidity (HCC)     Numbness and tingling in both hands     Neuropathic pain of both legs     Gait difficulty     Diabetic polyneuropathy associated with type 2 diabetes mellitus (HCC)     Muscle spasms of both lower extremities     Ulnar neuropathy of right upper extremity     Right median nerve neuropathy     Mass of right elbow     Past Medical History:   Diagnosis Date    Bundle branch block, left     [facility]; last visit (virtual) April 2020    Diabetes [facility])     Diabetes mellitus ([facility] 6/9/2017    Diabetic polyneuropathy associated with type 2 diabetes mellitus ([facility] 5/4/2020    Gait difficulty 5/4/2020    GERD (gastroesophageal reflux disease)     History of echocardiogram 2016    [facility]; [facility]    Hyperlipidemia     Hypertension     PCP Renetta Arredondo NP, seen on 1/5/2020    Kidney calculi     Lumbar radiculopathy 6/9/2017    Mass of right elbow 5/28/2020    Muscle spasms of both lower extremities 5/4/2020    Neuropathic pain of both legs 5/4/2020  Non-ischemic cardiomyopathy (HCC)     Numbness and tingling in both hands 5/29/2019    Obstructive sleep apnea syndrome 06/09/2017    No machine    PONV (postoperative nausea and vomiting)     needs scop patch for OR    Prolonged emergence from general anesthesia     Restless legs syndrome 6/9/2017    Restless legs syndrome (RLS) 6/9/2017    Right median nerve neuropathy 5/28/2020    Severe obesity (BMI 35.0-39. 9) with comorbidity ([address] 7/31/2018    Ulnar neuropathy of right upper extremity 5/28/2020    Wears dentures     Upper & lower      Past Surgical History:   Procedure Laterality Date    ANKLE SURGERY Right     [facility]    CARDIAC CATHETERIZATION  01/2020    \"at a [facility]\";   No blockages    CARPAL TUNNEL RELEASE Right 05/28/2020    CUBITAL, GUYONS CANAL AND CARPAL TUNNEL RELEASE     CARPAL TUNNEL RELEASE Right 5/28/2020    CUBITAL, GUYONS CANAL AND CARPAL TUNNEL RELEASE performed by Belkis Cedeño DO at [address] [facility]    No polyps    GLAUCOMA SURGERY Bilateral 12/2018    HC INJECTION PROCEDURE FOR SACROILIAC JOINT Bilateral 2/21/2020    SACROILIAC JOINT INJECTION performed by Mo Padron MD at [facility] Right     SINUS SURGERY       Family History   Problem Relation Age of Onset    High Cholesterol Mother     Depression Mother     Heart Disease Father         stents    High Cholesterol Father     Alcohol Abuse Father     Heart Surgery Father         CABG    Heart Attack Father     Lung Cancer Father         primary, transferred to the bones    Dementia Father     Diabetes Maternal Grandmother     Diabetes Maternal Grandfather     No Known Problems Paternal Grandmother     No Known Problems Paternal Grandfather     No Known Problems Half-Sister     No Known Problems Half-Brother     Thyroid Disease Daughter      Social History     Tobacco Use    Smoking status: Former Smoker     Packs/day: 3.00     Years: 20.00 · Constitutional: Cindy Allen is oriented to person, place, and time. Vital signs are normal. Appears well-developed and well-nourished. · HEENT:   · Head: Normocephalic and atraumatic. Right Ear: Hearing and external ear normal. TM normal  Canal normal  · Left Ear: Hearing and external ear normal. TM normal Canal normal  · Nose: Nares normal. Septum midline. No drainage or sinus tenderness. Mucosa pink and moist  · Mouth/Throat: Oropharynx- No erythema, no exudate. Uvula midline, no erythema, no edema. Mucous membranes are pink and moist.   · Eyes:PERRL, EOMI, Conjunctiva normal, No discharge. · Neck: Full passive range of motion. Non-tender on palpation. Neck supple. No thyromegaly present. Trachea normal.  · Cardiovascular: Normal rate, regular rhythm, S1, S2, no murmur, no gallop, no friction rub, intact distal pulses. No carotid bruit  · Pulmonary/Chest: Breath sounds are clear throughout, No respiratory distress, No wheezing, No chest tenderness. Effort normal  · Lymphadenopathy: large palpable right cervical lymph node. · Neurological: Alert and oriented to person, place, and time. Normal motor function, Normal sensory function, No focal deficits noted. He has normal strength. · Skin: Skin is warm, dry and intact. No obvious lesions on exposed skin  · Psychiatric: Normal mood and affect. Speech is normal and behavior is normal.     Nursing note and vitals reviewed. Blood pressure 126/84, pulse 88, temperature 98.1 °F (36.7 °C), temperature source Temporal, weight (!) 304 lb (137.9 kg), SpO2 95 %. Body mass index is 40.11 kg/m².     Wt Readings from Last 3 Encounters:   02/24/21 (!) 304 lb (137.9 kg)   02/11/21 290 lb (131.5 kg)   12/10/20 290 lb (131.5 kg)     BP Readings from Last 3 Encounters:   02/24/21 126/84   10/14/20 (!) 149/88   10/13/20 (!) 152/80       Results for POC orders placed in visit on 02/24/21   POCT glycosylated hemoglobin (Hb A1C)   Result Value Ref Range Hemoglobin A1C 7.4 %       Completed Orders/Prescriptions   No orders of the defined types were placed in this encounter. AssessmentPlan/Medical Decision Making     1. Lymphadenopathy of right cervical region  - most likely related to viral illness  - continue to monitor at this time  - patient will f/u if worsening or failure to resolve    2. Type 2 diabetes mellitus with hyperglycemia, with long-term current use of insulin (HCC)  - patient to update current medications and doses  - POCT glycosylated hemoglobin (Hb A1C)      Return in about 1 month (around 3/24/2021) for DM. 1.  Fidencio Christopher received counseling on the following healthy behaviors: nutrition, exercise and medication adherence  2. Patient given educational materials - see patient instructions  3. Was a self-tracking handout given in paper form or via [employer]? No  If yes, see orders or list here. 4.  Discussed use, benefit, and side effects of prescribed medications. Barriers to medication compliance addressed. All patient questions answered. Pt voiced understanding. 5.  Reviewed prior labs, imaging, consultation, follow up, and health maintenance  6. Continue current medications, diet and exercise. 7. Discussed use, benefit, and side effects of prescribed medications. Barriers to medication compliance addressed. All her questions were answered. Pt voiced understanding. Fidencio Christopher will continue current medications, diet and exercise. Of the 25 minute duration appointment visit, Akanksha Roth CNP spent at least 50% of the face-to-face time in counseling, explanation of diagnosis, planning of further management, and answering all questions. Signed:  Akanksha Roth CNP    This note is created with the assistance of a speech-recognition program.  While intending to generate a document that actually reflects the content of the visit, no guarantees can be provided that every mistake has been identified and corrected by editing.

## 2021-02-25 ENCOUNTER — HOSPITAL ENCOUNTER (OUTPATIENT)
Dept: NON INVASIVE DIAGNOSTICS | Age: 51
Discharge: HOME | End: 2021-02-25
Payer: MEDICARE

## 2021-02-25 LAB
LV EF: 40 %
LVEF MODALITY: NORMAL

## 2021-02-25 PROCEDURE — 93306 TTE W/DOPPLER COMPLETE: CPT

## 2021-03-12 RX ORDER — CLOTRIMAZOLE AND BETAMETHASONE DIPROPIONATE 10; .64 MG/G; MG/G
CREAM TOPICAL
Qty: 90 G | Refills: 2 | Status: SHIPPED | OUTPATIENT
Start: 2021-03-12

## 2021-03-13 ENCOUNTER — HOSPITAL ENCOUNTER (OUTPATIENT)
Dept: LAB | Age: 51
Setting detail: SPECIMEN
Discharge: HOME | End: 2021-03-13
Payer: MEDICARE

## 2021-03-13 DIAGNOSIS — Z20.822 COVID-19 RULED OUT BY LABORATORY TESTING: Primary | ICD-10-CM

## 2021-03-13 PROCEDURE — U0003 INFECTIOUS AGENT DETECTION BY NUCLEIC ACID (DNA OR RNA); SEVERE ACUTE RESPIRATORY SYNDROME CORONAVIRUS 2 (SARS-COV-2) (CORONAVIRUS DISEASE [COVID-19]), AMPLIFIED PROBE TECHNIQUE, MAKING USE OF HIGH THROUGHPUT TECHNOLOGIES AS DESCRIBED BY CMS-2020-01-R: HCPCS

## 2021-03-13 PROCEDURE — U0005 INFEC AGEN DETEC AMPLI PROBE: HCPCS

## 2021-03-15 LAB
SARS-COV-2: ABNORMAL
SARS-COV-2: DETECTED
SOURCE: ABNORMAL

## 2021-03-18 ENCOUNTER — HOSPITAL ENCOUNTER (OUTPATIENT)
Dept: PULMONOLOGY | Age: 51
Discharge: HOME | End: 2021-03-18
Payer: MEDICARE

## 2021-03-18 PROCEDURE — 94729 DIFFUSING CAPACITY: CPT

## 2021-03-18 PROCEDURE — 94664 DEMO&/EVAL PT USE INHALER: CPT

## 2021-03-18 PROCEDURE — 94726 PLETHYSMOGRAPHY LUNG VOLUMES: CPT

## 2021-03-18 PROCEDURE — 94060 EVALUATION OF WHEEZING: CPT

## 2021-03-18 PROCEDURE — 94640 AIRWAY INHALATION TREATMENT: CPT

## 2021-03-19 NOTE — PROCEDURES
[address]                  [facility]                               PULMONARY FUNCTION    PATIENT NAME: PUNEET MELARA                      :        1970  MED REC NO:   [id number]                             ROOM:  ACCOUNT NO:   [de-identified]                           ADMIT DATE: 2021  PROVIDER:     Marshal Stafford    DATE OF PROCEDURE:  2021    Spirometry shows FVC is 4.36, 80% predicted. FEV1 is 3.60, 84%  predicted. Both are within normal limits. FEV1/FVC ratio is normal  without evidence of obstruction. Post bronchodilator, there was no  significant improvement in FEV1 or FVC to suggest bronchospasticity or  airway reversibility. Total lung capacity is 6.42, 84% predicted. Residual volume is 1.87, 83% predicted. Both are within normal limits. Diffusion capacity is 32.03, 88% predicted, which is within normal  limits. IMPRESSION:  This pulmonary function test shows normal spirometry  without significant response to bronchodilator, but clinical response is  possible. Lung volumes are normal.  Diffusion capacity is normal.   Normal pulmonary function test.  Clinical correlation is recommended.         Katie Mari    D: 2021 19:25:41       T: 2021 19:33:51     [email or web address]  Job#: [id number]     Doc#: [id number]    CC:

## 2021-03-20 DIAGNOSIS — E78.00 HYPERCHOLESTEROLEMIA: ICD-10-CM

## 2021-03-22 ENCOUNTER — TELEPHONE (OUTPATIENT)
Dept: PULMONOLOGY | Age: 51
End: 2021-03-22

## 2021-03-22 ENCOUNTER — TELEPHONE (OUTPATIENT)
Dept: FAMILY MEDICINE CLINIC | Age: 51
End: 2021-03-22

## 2021-03-22 RX ORDER — ATORVASTATIN CALCIUM 20 MG/1
TABLET, FILM COATED ORAL
Qty: 30 TABLET | Refills: 0 | Status: SHIPPED | OUTPATIENT
Start: 2021-03-22

## 2021-03-22 NOTE — TELEPHONE ENCOUNTER
Patient presented to the office today for his scheduled appointment. When the chart was accessed, it was discovered the patient was positive for COVID on 3/13. The patient was asked to reschedule as he has not completed quarantine. During the conversation, he presented his phone with outside lab results for COVID testing as negative. I explained to the patient that he would have to reschedule after quarantine for his routine visit. During this time, he demanded that his positive result be removed from his chart. I explained that is not possible. I also explained that there is possibility that the negative test is a false negative. He was not happy that he could not be seen as he stated he did not have symptoms. He continued the discussion of his care from across the waiting room in front of several patients. I feel this patient would be better served by another provider as this is not the first time he has voiced that he was not happy with his care in this office.

## 2021-03-22 NOTE — TELEPHONE ENCOUNTER
Baron Montano is calling to request a refill on the following medication(s):    Medication Request:  Requested Prescriptions     Pending Prescriptions Disp Refills    atorvastatin (LIPITOR) 20 MG tablet [Pharmacy Med Name: ATORVASTATIN CALCIUM 20MG TABLET] 30 tablet 5     Sig: TAKE ONE TABLET BY MOUTH ONCE A DAY AT BEDTIME       Last Visit Date (If Applicable):  4/27/7648 In office    Next Visit Date:    3/22/2021

## 2021-04-13 NOTE — PROGRESS NOTES
All items selected indicate a positive finding. Those items not selected are negative.   Constitutional [x] Weight loss/gain   [] Fatigue  [] Fever/Chills   HEENT [] Hearing Loss  [] Visual Disturbance  [] Tinnitus  [] Eye pain   Respiratory [x] Shortness of Breath  [x] Cough  [x] Snoring   Cardiovascular [] Chest Pain  [] Palpitations  [] Lightheaded   GI [] Constipation  [] Diarrhea  [] Swallowing change  [] Nausea/vomiting    [x] Urinary Frequency  [x] Urinary Urgency   Musculoskeletal [] Neck pain  [x] Back pain  [x] Muscle pain  [] Restless legs   Dermatologic [] Skin changes   Neurologic [] Memory loss/confusion  [] Seizures  [x] Trouble walking or imbalance  [x] Dizziness  [] Sleep disturbance  [x] Weakness  [x] Numbness  [x] Tremors  [] Speech Difficulty  [x] Headaches  [x] Light Sensitivity  [x] Sound Sensitivity   Endocrinology [x]Excessive thirst  [x]Excessive hunger   Psychiatric [] Anxiety/Depression  [] Hallucination   Allergy/immunology []Hives/environmental allergies   Hematologic/lymph [] Abnormal bleeding  [] Abnormal bruising

## 2021-04-14 NOTE — PROGRESS NOTES
[address] [facility],4Th Floor Neurology Telehealth Followup Visit    Pt Name: Le Torres  MRN: [id number]  YOB: 1970  Date of evaluation: 4/15/2021  Last visit date: 8/4/2020    Primary Care Physician: BRENNON Barclay CNP  Reason for Evaluation: TELEHEALTH EVALUATION -- Audio/Visual (During SKVYA-72 public health emergency)      Dear DEVAUGHN Krishna CNP    I saw Mr. Le Torres in virtual visit follow-up today in continuation of neurologic care. As you know he  is a 45 y.o. right handed  male with diabetic peripheral polyneuropathy with superimposed digna CTS. He had Rt cubital, Guyons canal and carpal tunnel release on 5/28/2020 with residual symptoms in right hand and he is \"okay with it\". He comes to the visit stating that his neuropathci pain is not any better and \"feet are killing me\". His symptoms in the evening. He tried higher doses of gabapentin without any help. He also tried Lyrica and duloxetine and without any help. He is on smaller doses of amitriptyline and he has noticed improvement with it. He is agreeable to try slight upward titration of amitriptyline. He is able to tolerate amitriptyline so far well without any significant side effects. He still has ongoing pins-and-needles sensations in lower extremities. He tried Capsaicin cream and could not tolerate it. Afterwards, he visited pain clinic and he was on liquid preparation med, ? Topical solution with some relief. He also stated that he he was seen by chiropractor and then had MRI lumbar spine and it was abnormal for which he was subsequently referred to neuosurgeon for \"pinched nerve\". He did not want any discogram or surgical interventions. He wants to continue chiropractor interventions at this point of time. He is wondering about conservative measures in control of diabetic polyneuropathic pain.     Neuropathy described as significantly abnormal and painful sensations with numbness and tingling in fingers along with stinging sensation and cramps in both calves. He has restless legs with intermittent myoclonic jerks through the night. He also has been having significant gait difficulties with \"occasional right leg giving out\". Taking smaller dose of Sinemet with the partial relief. He has ongoing dysesthesias in bilateral lower extremities with marginal relief with amitriptyline. Increasing the dose of amitriptyline is making him lethargic. He is taking 50 mg every night. He was borderline diabetic since 2014 or so. He is on multiple diabetic medications including insulin, Victoza, etc.  His blood sugars had been under control but his symptoms had been getting worse. He has had NCS/EMG testing of lower extremities and he was told to have neuropathy. He was on gabapentin 300 mg 3 times daily and it has caused increased sleepiness and he stopped taking it. He also tried Lyrica and Cymbalta with no help. He has been having stinging sensation along with tingling and numbness in lower legs and feet. He also has been having painful sensations in both hands and those are described as clue-like sensation in medial aspect of both forearms and last 2 digits. He has been having trouble walking with increased pain upon walking. He denies radiating pain and paresthesias across the lower back. Denies bladder and bowel incontinence. He has been having extreme difficulty walking with unsteady gait with progressive pain in bilateral lower extremities. Patient states he is getting a compound cream from [employer] (gabapentin 10%, ketoprofen 10%, lidocaine 2%, prilocaine 2% solution) to be applied 2-5 drops to area of pain up to 4 times a day prn    Review of systems done and pertinent positives include Back pain, myalgias, gait difficulties, dizziness, numbness, weakness, intermittent headaches with light sensitivity, urinary frequency and urgency. Denies dysuria.       Current Outpatient Medications on File Prior to Visit   Medication Sig Dispense Refill    atorvastatin (LIPITOR) 20 MG tablet TAKE ONE TABLET BY MOUTH ONCE A DAY AT BEDTIME 30 tablet 0    clotrimazole-betamethasone (LOTRISONE) 1-0.05 % cream APPLY TOPICALLY TWICE A DAY 90 g 2    carbidopa-levodopa (SINEMET)  MG per tablet TAKE 1 TABLET BY MOUTH AT 8PM AND 11PM 180 tablet 0    tiZANidine (ZANAFLEX) 4 MG tablet TAKE ONE TABLET BY MOUTH ONCE A DAY AT BEDTIME 90 tablet 0    amitriptyline (ELAVIL) 25 MG tablet TAKE TWO TABLETS BY MOUTH ONCE A DAY AT BEDTIME 60 tablet 2    TRUEplus Lancets 30G MISC USE TO TEST BLOOD SUGAR 4 TIMES A  each 0    omeprazole (PRILOSEC) 20 MG delayed release capsule TAKE ONE CAPS BY MOUTH ONCE A DAY BEFORE BREAKFAST 30 capsule 5    zinc oxide 20 % ointment MIX WITH KETOCONAZOLE 1:1 AND APPLY TO GROIN CREASES NIGHTLY 60 g 3    ketoconazole (NIZORAL) 2 % cream MIX WITH ZINC OXIDE 1:1 AND APPLY TO GROIN CREASES NIGHTLY 60 g 2    ASPIRIN LOW DOSE 81 MG EC tablet Take 1 tablet by mouth daily      latanoprost (XALATAN) 0.005 % ophthalmic solution Place 1 drop into both eyes daily      Misc. Devices MISC 1 PAIR OF DIABETIC SHOES (1 LEFT/ 1 RIGHT)  1-3 PAIRS OF INSERTS (LEFT/ RIGHT) 2 each 0    vitamin D (ERGOCALCIFEROL) 1.25 MG (99083 UT) CAPS capsule Take 1 capsule by mouth once a week      GLUCAGON EMERGENCY 1 MG injection       isosorbide mononitrate (IMDUR) 30 MG extended release tablet Take 1 tablet by mouth daily      HYDRALAZINE HCL PO Take 50 mg by mouth daily      Alcohol Swabs (B-D SINGLE USE SWABS REGULAR) PADS USE BEFORE TESTING 4 TIMES A DAY PLUS BEFORE USING INSULIN (5 TIMES A DAY) 365 each 2    lisinopril (PRINIVIL;ZESTRIL) 5 MG tablet Take 5 mg by mouth nightly       metoprolol succinate (TOPROL XL) 25 MG extended release tablet Take 25 mg by mouth daily      nystatin (MYCOSTATIN) 277060 UNIT/GM cream Apply topically 2 times daily.  15 g 3    Semaglutide (OZEMPIC, 1 MG/DOSE, SC) Inject 1 mg into the skin once a week Every Sunday      Sulfacetamide Sodium 9.8 % LOTN Apply to nose and cheeks twice daily (Patient taking differently: Apply topically as needed Apply to nose and cheeks twice daily) 113 g 3    blood glucose monitor strips Test 4 times a day & as needed for symptoms of irregular blood glucose. 120 strip 3    Insulin Pen Needle (COMFORT EZ PEN NEEDLES) 32G X 4 MM MISC Use 5 times daily. Change needle each time with Victoza, Basaglar, and Humalog 100 each 11    ammonium lactate (AMLACTIN) 12 % cream APPLY TOPICALLY AS NEEDED. (Patient taking differently: Apply topically as needed Apply topically as needed.) 280 g 2    Lancet Devices ([employer] DIAGNOSTICS LANCING DEV) MISC USE TO TEST BLOOD SUGAR 4 TIMES A DAY 1 each 3    TRUE METRIX BLOOD GLUCOSE TEST strip TEST BLOOD SUGAR 3 TO 4 TIMES DAILY 100 each 1    PHARMACIST CHOICE ALCOHOL 70 % PADS USE BEFORE TESTING SUGAR (4 TIMES) PLUS BEFORE USING INSULIN (5 TIMES A DAY) 270 each 11    BENZOYL PEROXIDE 5 % external wash WASH CHEST AND BACK 1 TO 2 TIMES DAILY 227 g 3     No current facility-administered medications on file prior to visit. Allergies: Han Waite is allergic to keflex [cephalexin]; codeine; corticosteroids; doxycycline; ibuprofen; and metformin and related.     Past Medical History:   Diagnosis Date    Bundle branch block, left     [facility]; last visit (virtual) April 2020    Diabetes [facility])     Diabetes mellitus ([facility] [address] 6/9/2017    Diabetic polyneuropathy associated with type 2 diabetes mellitus ([facility] [address] 5/4/2020    Gait difficulty 5/4/2020    GERD (gastroesophageal reflux disease)     History of echocardiogram 2016    [facility]; [facility]    Hyperlipidemia     Hypertension     PCP BFkevan Fortune NP, seen on 1/5/2020    Kidney calculi     Lumbar radiculopathy 6/9/2017    Mass of right elbow 5/28/2020    Muscle spasms of both lower extremities 5/4/2020    Neuropathic pain of both legs 5/4/2020     No Known Problems Paternal Grandmother     No Known Problems Paternal Grandfather     No Known Problems Half-Sister     No Known Problems Half-Brother     Thyroid Disease Daughter      On exam: He has checked his blood pressure this Monday 142/87 and pulse was \"normal\". Weight 304 Lb.       NEUROLOGIC EXAMINATION  GENERAL  Appears comfortable and in no distress   HEENT  NC/ AT   NECK  Supple    MENTAL STATUS:  Alert, oriented, intact memory, no confusion, normal speech, normal language, no hallucination or delusion; appropriate affect   CRANIAL NERVES: II     -      PERRLA   III,IV,VI -  EOMs full, no KRISTINE, no ptosis  V     -     Unable to perform  VII    -     Normal facial symmetry  VIII   -     Intact hearing  IX,X -     unable to perform  XI    -     Symmetrical shoulder shrug  XII   -     Midline tongue, no atrophy    MOTOR FUNCTION:  significant for no visible weakness in bilateral upper and lower extremities with normal bulk and no involuntary movements, no tremor   SENSORY FUNCTION:  Unable to perform   CEREBELLAR FUNCTION:  Intact fine motor control over upper limbs   REFLEX FUNCTION:  Unable to perform   STATION and GAIT  Normal station, wide-based gait     NCS/EMG of bilateral upper and lower extremities 5/29/18:    Performed by Dr. Hellen Jordan:   Abnormal study; electrophysiologic evidence of mild bilateral carpal tunnel syndrome.        EMG of Bilateral UE (4/4/19):   Electrodiagnostic Interpretation:   There is electrophysiologic evidence of ulnar neuropathy at right elbow with demyelinating and axonal features. Supporting features include low ulnar snap, prolonged ulnar cmap with slowing of greater than 1011 m/s across the elbow segment compared with the forearm segment.   This study also demonstrated electrophysiologic evidence of bilateral median neuropathy (bilateral CTS)  of mild-moderate severity.      MRI lumbar spine 10/29/2019: Degenerative disc disease at L5-S1 with disc bulge superimposed central to left paracentral disc protrusion, with narrowing of the left lateral recess, possibly contacting left descending S1 nerve root, with mild bilateral foraminal narrowing. Holter monitor 48 hr (3/16/2020): no a fib; no svt or ventricular ectopy      Lab Results   Component Value Date    TSH 0.98 02/12/2020         Lab Results   Component Value Date    LABA1C 7.4 02/24/2021           Impression and Plan: Mr. Liang Syed is a 45 y.o. male with   Diabetic neuropathy; inadequately controlled; \"feet are killing me\" \"mostly in evening\" \"flares up when its time to go to bed\" \"can't put blanket on my feet\". Failed GBP, Lyrica and Duloxetine and only \"25 mg  Elavil\". Elavil \"was helping earlier but not anymore\"; he is willing to try 1.5 tab of 25mg elavil; script sent. Regarding control of diabetic peripheral polyneuropathy; major goals of care should include reduction of modifiable risk factors such as hyperglycemia, hyperlipidemia, hypertension, obesity and tobacco abuse. Optimal types of exercise and specific dietary recommendations may improve outcomes. Lipid panel is requested. Increasing HDL and decreasing triglycerides would certainly help. Inadequately controlled restless legs syndrome; on sinemet at half tab at 8 pm and 11 pm; will increase the dose to full tab of Sinemet 25/100 tab at 8 pm + 11 pm; will also check ferritin level. Sleep apnea; on nightly cpap    Entrapment neuropathy; right ulnar neuropathy & bilateral CTS; s/p Rt cubital, Guyons canal and carpal tunnel release on 5/28/2020; hand got infected; finally healed; \"things are some better\" \"all the feeling of 4th, 5th digits didn't come back\". Discussed about referral to [facility] or [facility]; he doesn't want any referral.     Follow up in 4 months. This is a telehealth visit that was performed with the originating site at Patient Location: Patient Home and Provider Location of [facility].  Patient ID verified by me prior to start of this visit. Verbal consent to participate in video visit was obtained. Pursuant to the emergency declaration under the [facility], [facility] waiver authority and the [employer] Act, this Virtual Visit was conducted, with patient's consent, to reduce the patient's risk of exposure to COVID-19 and provide continuity of care for an established/new patient. Complete and detailed physical examination is not feasible during this virtual video visit and patient is agreeable and understood. Services were provided through a video synchronous discussion virtually to substitute for in-person clinic visit. I discussed with the patient the nature of our telehealth visits via interactive/real-time audio/video that:  - I would evaluate the patient and recommend diagnostics and treatments based on my assessment  - Our sessions are not being recorded and that personal health information is protected  - Our team would provide follow up care in person if/when the patient needs it.

## 2021-04-15 ENCOUNTER — TELEMEDICINE (OUTPATIENT)
Dept: NEUROLOGY | Age: 51
End: 2021-04-15
Payer: MEDICARE

## 2021-04-15 ENCOUNTER — OFFICE VISIT (OUTPATIENT)
Dept: PODIATRY | Age: 51
End: 2021-04-15
Payer: MEDICARE

## 2021-04-15 VITALS — RESPIRATION RATE: 16 BRPM | WEIGHT: 304 LBS | BODY MASS INDEX: 40.29 KG/M2 | HEIGHT: 73 IN

## 2021-04-15 DIAGNOSIS — E11.42 DIABETIC POLYNEUROPATHY ASSOCIATED WITH TYPE 2 DIABETES MELLITUS (HCC): ICD-10-CM

## 2021-04-15 DIAGNOSIS — I73.9 PERIPHERAL VASCULAR DISORDER (HCC): ICD-10-CM

## 2021-04-15 DIAGNOSIS — E61.1 IRON DEFICIENCY: ICD-10-CM

## 2021-04-15 DIAGNOSIS — M79.671 BILATERAL FOOT PAIN: ICD-10-CM

## 2021-04-15 DIAGNOSIS — G57.93 NEUROPATHIC PAIN OF BOTH LEGS: Primary | ICD-10-CM

## 2021-04-15 DIAGNOSIS — E11.51 TYPE II DIABETES MELLITUS WITH PERIPHERAL CIRCULATORY DISORDER (HCC): Primary | ICD-10-CM

## 2021-04-15 DIAGNOSIS — M79.672 BILATERAL FOOT PAIN: ICD-10-CM

## 2021-04-15 DIAGNOSIS — B35.1 DERMATOPHYTOSIS OF NAIL: ICD-10-CM

## 2021-04-15 DIAGNOSIS — G25.81 RESTLESS LEGS SYNDROME: ICD-10-CM

## 2021-04-15 PROCEDURE — 99214 OFFICE O/P EST MOD 30 MIN: CPT | Performed by: PSYCHIATRY & NEUROLOGY

## 2021-04-15 PROCEDURE — 2022F DILAT RTA XM EVC RTNOPTHY: CPT | Performed by: PSYCHIATRY & NEUROLOGY

## 2021-04-15 PROCEDURE — 3051F HG A1C>EQUAL 7.0%<8.0%: CPT | Performed by: PSYCHIATRY & NEUROLOGY

## 2021-04-15 PROCEDURE — 3017F COLORECTAL CA SCREEN DOC REV: CPT | Performed by: PSYCHIATRY & NEUROLOGY

## 2021-04-15 PROCEDURE — G8427 DOCREV CUR MEDS BY ELIG CLIN: HCPCS | Performed by: PSYCHIATRY & NEUROLOGY

## 2021-04-15 PROCEDURE — 11721 DEBRIDE NAIL 6 OR MORE: CPT | Performed by: PODIATRIST

## 2021-04-15 RX ORDER — AMITRIPTYLINE HYDROCHLORIDE 25 MG/1
TABLET, FILM COATED ORAL
Qty: 135 TABLET | Refills: 1 | Status: SHIPPED | OUTPATIENT
Start: 2021-04-15 | End: 2022-04-21

## 2021-04-15 NOTE — PROGRESS NOTES
06/09/2017    No machine    PONV (postoperative nausea and vomiting)     needs scop patch for OR    Prolonged emergence from general anesthesia     Restless legs syndrome 6/9/2017    Restless legs syndrome (RLS) 6/9/2017    Right median nerve neuropathy 5/28/2020    Severe obesity (BMI 35.0-39. 9) with comorbidity ([facility] [address] 7/31/2018    Ulnar neuropathy of right upper extremity 5/28/2020    Wears dentures     Upper & lower       Allergies   Allergen Reactions    Keflex [Cephalexin] Diarrhea    Codeine Itching    Corticosteroids Swelling     Injection site swelling    Doxycycline      GERD    Ibuprofen Other (See Comments)     GERD    Metformin And Related Diarrhea     Current Outpatient Medications on File Prior to Visit   Medication Sig Dispense Refill    carbidopa-levodopa (SINEMET)  MG per tablet TAKE 1 TABLET BY MOUTH AT 8PM AND 11PM 180 tablet 1    amitriptyline (ELAVIL) 25 MG tablet Take 1.5 tab nightly 135 tablet 1    atorvastatin (LIPITOR) 20 MG tablet TAKE ONE TABLET BY MOUTH ONCE A DAY AT BEDTIME 30 tablet 0    clotrimazole-betamethasone (LOTRISONE) 1-0.05 % cream APPLY TOPICALLY TWICE A DAY 90 g 2    tiZANidine (ZANAFLEX) 4 MG tablet TAKE ONE TABLET BY MOUTH ONCE A DAY AT BEDTIME 90 tablet 0    TRUEplus Lancets 30G MISC USE TO TEST BLOOD SUGAR 4 TIMES A  each 0    omeprazole (PRILOSEC) 20 MG delayed release capsule TAKE ONE CAPS BY MOUTH ONCE A DAY BEFORE BREAKFAST 30 capsule 5    zinc oxide 20 % ointment MIX WITH KETOCONAZOLE 1:1 AND APPLY TO GROIN CREASES NIGHTLY 60 g 3    ketoconazole (NIZORAL) 2 % cream MIX WITH ZINC OXIDE 1:1 AND APPLY TO GROIN CREASES NIGHTLY 60 g 2    ASPIRIN LOW DOSE 81 MG EC tablet Take 1 tablet by mouth daily      latanoprost (XALATAN) 0.005 % ophthalmic solution Place 1 drop into both eyes daily      Misc.  Devices MISC 1 PAIR OF DIABETIC SHOES (1 LEFT/ 1 RIGHT)  1-3 PAIRS OF INSERTS (LEFT/ RIGHT) 2 each 0    vitamin D (ERGOCALCIFEROL) 1.25 MG (86022 UT) CAPS capsule Take 1 capsule by mouth once a week      GLUCAGON EMERGENCY 1 MG injection       isosorbide mononitrate (IMDUR) 30 MG extended release tablet Take 1 tablet by mouth daily      HYDRALAZINE HCL PO Take 50 mg by mouth daily      Alcohol Swabs (B-D SINGLE USE SWABS REGULAR) PADS USE BEFORE TESTING 4 TIMES A DAY PLUS BEFORE USING INSULIN (5 TIMES A DAY) 365 each 2    lisinopril (PRINIVIL;ZESTRIL) 5 MG tablet Take 5 mg by mouth nightly       metoprolol succinate (TOPROL XL) 25 MG extended release tablet Take 25 mg by mouth daily      nystatin (MYCOSTATIN) 097100 UNIT/GM cream Apply topically 2 times daily. 15 g 3    Semaglutide (OZEMPIC, 1 MG/DOSE, SC) Inject 1 mg into the skin once a week Every Sunday      Sulfacetamide Sodium 9.8 % LOTN Apply to nose and cheeks twice daily (Patient taking differently: Apply topically as needed Apply to nose and cheeks twice daily) 113 g 3    blood glucose monitor strips Test 4 times a day & as needed for symptoms of irregular blood glucose. 120 strip 3    Insulin Pen Needle (COMFORT EZ PEN NEEDLES) 32G X 4 MM MISC Use 5 times daily. Change needle each time with Victoza, Basaglar, and Humalog 100 each 11    ammonium lactate (AMLACTIN) 12 % cream APPLY TOPICALLY AS NEEDED. (Patient taking differently: Apply topically as needed Apply topically as needed.) 280 g 2    Lancet Devices (SIMPLE DIAGNOSTICS LANCING DEV) MISC USE TO TEST BLOOD SUGAR 4 TIMES A DAY 1 each 3    TRUE METRIX BLOOD GLUCOSE TEST strip TEST BLOOD SUGAR 3 TO 4 TIMES DAILY 100 each 1    PHARMACIST CHOICE ALCOHOL 70 % PADS USE BEFORE TESTING SUGAR (4 TIMES) PLUS BEFORE USING INSULIN (5 TIMES A DAY) 270 each 11    BENZOYL PEROXIDE 5 % external wash WASH CHEST AND BACK 1 TO 2 TIMES DAILY 227 g 3     No current facility-administered medications on file prior to visit.         Review of Systems    Review of Systems:   History obtained from chart review and the patient  General ROS: negative [x] [x] [x] [x] [x] [x] [x] [x]  5 4 3 2 1 1 2 3 4 5                          Right                                        Left    Thickness  [x] [x] [x] [x] [x] [x] [x] [x] [x] [x]  5 4 3 2 1 1 2 3 4 5                         Right                                        Left    Dystrophic Changes   [x] [x] [x] [x] [x] [x] [x] [x] [x] [x]  5 4 3 2 1 1 2 3 4 5                         Right                                        Left    Color   [x] [x] [x] [x] [x] [x] [x] [x] [x] [x]  5 4 3 2 1 1 2 3 4 5                          Right                                        Left    Incurvation/Ingrowin   [] [] [] [] [] [] [] [] [] []  5 4 3 2 1 1 2 3 4 5                         Right                                        Left    Inflammation/Pain   [x] [x] [x] [x] [x] [x] [x] [x] [x] [x]  5 4 3 2 1 1 2 3 4 5                         Right                                        Left        Visual inspection:  Deformity: hammertoe deformity digna feet  amputation: absent  Skin lesions: absent  Edema: right- 2+ pitting edema, left- 2+ pitting edema    Sensory exam:  Monofilament sensation: abnormal - 6/10 via SW 5.07/10g monofilament to the plantar foot bilateral feet    Pulses: abnormal - 1/4 dorsalis pedis pulse and 1/4 Posterior tibial pulse,   Pinprick: Impaired  Proprioception: Impaired  Vibration (128 Hz): Impaired       DM with PVD       [x]Yes    []No      Assessment:  45 y.o. male with:   Diagnosis Orders   1. Type II diabetes mellitus with peripheral circulatory disorder (HCC)   DIABETES FOOT EXAM    64854 - LA DEBRIDEMENT OF NAILS, 6 OR MORE   2. Dermatophytosis of nail  HM DIABETES FOOT EXAM    69429 - LA DEBRIDEMENT OF NAILS, 6 OR MORE   3. Peripheral vascular disorder (HCC)   DIABETES FOOT EXAM    66553 - LA DEBRIDEMENT OF NAILS, 6 OR MORE   4.  Bilateral foot pain   DIABETES FOOT EXAM    75003 - LA DEBRIDEMENT OF NAILS, 6 OR MORE           Q7   []Yes    []No                Q8   [x]Yes    []No Q9   []Yes    []No    Plan:   Pt was evaluated and examined. Patient was given personalized discharge instructions. Nails 1-10 were debrided sharply in length and thickness with a nipper and , without incident. Pt will follow up in 9 weeks or sooner if any problems arise. Diagnosis was discussed with the pt and all of their questions were answered in detail. Proper foot hygiene and care was discussed with the pt. Informed patient on proper diabetic foot care and importance of tight glycemic control. Patient to check feet daily and contact the office with any questions/problems/concerns.    Other comorbidity noted and will be managed by PCP.  4/15/2021    Electronically signed by Corrie Matthew DPM on 4/15/2021 at 1:37 PM  4/15/2021

## 2021-05-12 DIAGNOSIS — E11.42 DIABETIC POLYNEUROPATHY ASSOCIATED WITH TYPE 2 DIABETES MELLITUS (HCC): ICD-10-CM

## 2021-05-12 DIAGNOSIS — R26.9 GAIT DIFFICULTY: ICD-10-CM

## 2021-05-12 DIAGNOSIS — M62.838 MUSCLE SPASMS OF BOTH LOWER EXTREMITIES: ICD-10-CM

## 2021-05-14 RX ORDER — TIZANIDINE 4 MG/1
TABLET ORAL
Qty: 90 TABLET | Refills: 0 | Status: SHIPPED | OUTPATIENT
Start: 2021-05-14

## 2021-05-16 ENCOUNTER — HOSPITAL ENCOUNTER (EMERGENCY)
Age: 51
Discharge: HOME | End: 2021-05-16
Attending: EMERGENCY MEDICINE
Payer: MEDICARE

## 2021-05-16 ENCOUNTER — APPOINTMENT (OUTPATIENT)
Dept: GENERAL RADIOLOGY | Age: 51
End: 2021-05-16
Payer: MEDICARE

## 2021-05-16 VITALS
OXYGEN SATURATION: 95 % | SYSTOLIC BLOOD PRESSURE: 131 MMHG | HEIGHT: 73 IN | HEART RATE: 70 BPM | BODY MASS INDEX: 37.11 KG/M2 | DIASTOLIC BLOOD PRESSURE: 91 MMHG | TEMPERATURE: 98.1 F | WEIGHT: 280 LBS | RESPIRATION RATE: 16 BRPM

## 2021-05-16 DIAGNOSIS — M25.561 ACUTE PAIN OF RIGHT KNEE: Primary | ICD-10-CM

## 2021-05-16 PROCEDURE — 73562 X-RAY EXAM OF KNEE 3: CPT

## 2021-05-16 PROCEDURE — 6370000000 HC RX 637 (ALT 250 FOR IP): Performed by: EMERGENCY MEDICINE

## 2021-05-16 PROCEDURE — 99284 EMERGENCY DEPT VISIT MOD MDM: CPT

## 2021-05-16 RX ORDER — ACETAMINOPHEN 325 MG/1
650 TABLET ORAL ONCE
Status: COMPLETED | OUTPATIENT
Start: 2021-05-16 | End: 2021-05-16

## 2021-05-16 RX ADMIN — ACETAMINOPHEN 650 MG: 325 TABLET ORAL at 20:54

## 2021-05-16 ASSESSMENT — ENCOUNTER SYMPTOMS
EYES NEGATIVE: 1
RESPIRATORY NEGATIVE: 1
ALLERGIC/IMMUNOLOGIC NEGATIVE: 1
GASTROINTESTINAL NEGATIVE: 1

## 2021-05-16 ASSESSMENT — PAIN DESCRIPTION - LOCATION: LOCATION: KNEE

## 2021-05-17 ENCOUNTER — OFFICE VISIT (OUTPATIENT)
Dept: ORTHOPEDIC SURGERY | Age: 51
End: 2021-05-17
Payer: MEDICARE

## 2021-05-17 VITALS — HEIGHT: 73 IN | WEIGHT: 280 LBS | BODY MASS INDEX: 37.11 KG/M2

## 2021-05-17 DIAGNOSIS — S83.241A TEAR OF MEDIAL MENISCUS OF RIGHT KNEE, CURRENT, UNSPECIFIED TEAR TYPE, INITIAL ENCOUNTER: Primary | ICD-10-CM

## 2021-05-17 PROCEDURE — 1036F TOBACCO NON-USER: CPT | Performed by: PHYSICIAN ASSISTANT

## 2021-05-17 PROCEDURE — 99204 OFFICE O/P NEW MOD 45 MIN: CPT | Performed by: PHYSICIAN ASSISTANT

## 2021-05-17 PROCEDURE — 3017F COLORECTAL CA SCREEN DOC REV: CPT | Performed by: PHYSICIAN ASSISTANT

## 2021-05-17 PROCEDURE — G8427 DOCREV CUR MEDS BY ELIG CLIN: HCPCS | Performed by: PHYSICIAN ASSISTANT

## 2021-05-17 PROCEDURE — G8417 CALC BMI ABV UP PARAM F/U: HCPCS | Performed by: PHYSICIAN ASSISTANT

## 2021-05-17 RX ORDER — DICLOFENAC SODIUM 75 MG/1
75 TABLET, DELAYED RELEASE ORAL 2 TIMES DAILY WITH MEALS
Qty: 28 TABLET | Refills: 0 | Status: SHIPPED | OUTPATIENT
Start: 2021-05-17 | End: 2021-06-01

## 2021-05-25 ENCOUNTER — HOSPITAL ENCOUNTER (OUTPATIENT)
Dept: MRI IMAGING | Age: 51
Discharge: HOME | End: 2021-05-27
Payer: MEDICARE

## 2021-05-25 DIAGNOSIS — S83.241A TEAR OF MEDIAL MENISCUS OF RIGHT KNEE, CURRENT, UNSPECIFIED TEAR TYPE, INITIAL ENCOUNTER: ICD-10-CM

## 2021-05-25 PROCEDURE — 73721 MRI JNT OF LWR EXTRE W/O DYE: CPT

## 2021-05-26 ENCOUNTER — TELEPHONE (OUTPATIENT)
Dept: ORTHOPEDIC SURGERY | Age: 51
End: 2021-05-26

## 2021-05-29 DIAGNOSIS — S83.241A TEAR OF MEDIAL MENISCUS OF RIGHT KNEE, CURRENT, UNSPECIFIED TEAR TYPE, INITIAL ENCOUNTER: ICD-10-CM

## 2021-06-01 RX ORDER — DICLOFENAC SODIUM 75 MG/1
75 TABLET, DELAYED RELEASE ORAL 2 TIMES DAILY WITH MEALS
Qty: 28 TABLET | Refills: 0 | Status: SHIPPED | OUTPATIENT
Start: 2021-06-01 | End: 2021-06-14

## 2021-06-01 NOTE — TELEPHONE ENCOUNTER
Patient is requesting refill on Voltaren,  LRF 5/17/21 #28 for right knee pain. Looks as though the first script.

## 2021-06-08 ENCOUNTER — OFFICE VISIT (OUTPATIENT)
Dept: ORTHOPEDIC SURGERY | Age: 51
End: 2021-06-08
Payer: MEDICARE

## 2021-06-08 VITALS — HEIGHT: 73 IN | WEIGHT: 273 LBS | BODY MASS INDEX: 36.18 KG/M2

## 2021-06-08 DIAGNOSIS — M25.461 EFFUSION OF BURSA OF RIGHT KNEE: ICD-10-CM

## 2021-06-08 DIAGNOSIS — M25.561 ACUTE PAIN OF RIGHT KNEE: Primary | ICD-10-CM

## 2021-06-08 PROCEDURE — 99214 OFFICE O/P EST MOD 30 MIN: CPT | Performed by: PHYSICIAN ASSISTANT

## 2021-06-08 PROCEDURE — G8417 CALC BMI ABV UP PARAM F/U: HCPCS | Performed by: PHYSICIAN ASSISTANT

## 2021-06-08 PROCEDURE — G8427 DOCREV CUR MEDS BY ELIG CLIN: HCPCS | Performed by: PHYSICIAN ASSISTANT

## 2021-06-08 PROCEDURE — 3017F COLORECTAL CA SCREEN DOC REV: CPT | Performed by: PHYSICIAN ASSISTANT

## 2021-06-08 PROCEDURE — 1036F TOBACCO NON-USER: CPT | Performed by: PHYSICIAN ASSISTANT

## 2021-06-09 NOTE — PROGRESS NOTES
[address] [facility], [address] [place], [address] [place] [address], [id number] [place] [address]           Dept Phone: [phone]           Dept Fax:  [address]           Sathya Lara          Dept Phone: [phone]           Dept Fax:  [phone]      Chief Compliant:  Chief Complaint   Patient presents with    Follow-up     Rt knee        History of Present Illness:  Stu Hallman returns today. This is a 45 y.o. male who presents to the clinic today for follow up of right knee pain. Here to discuss MRI results. Patient had an injury of the right knee on 5/14/2021 when he bent down to get underneath a car and felt a \"pop\" in this knee. Initial evaluation by me on 5/17/2021 was concern for possible medial meniscus tear so patient was sent for MRI rule this out. MRI was completed on 5/25/2021. He returns today to discuss results. Patient continues to have significant pain of the right knee associated with swelling. Pain continues to be most severe over the medial aspect is aggravated by knee flexion as well as any pivoting motion of the knee. Swelling seems to be worsened by activity. He continues to find minimal relief with elevation, ice and over-the-counter medication. Review of Systems   Constitutional: Negative for fever, chills, sweats, recent illness, or recent injury. Neurological: Negative for headaches, numbness, or weakness. Integumentary: Negative for rash, itching, ecchymosis, abrasions, or laceration. Musculoskeletal: Positive for Follow-up (Rt knee)       Physical Exam:  Constitutional: Patient is oriented to person, place, and time. Patient appears well-developed and well nourished.    Musculoskeletal:    Right Knee:     Skin: warm and dry, no rash or erythema  Vasculature: 2+ pedal pulses bilaterally  Neuro: Sensation grossly intact to meniscal tear however there does appear to be some intrameniscal degeneration according to radiology report. Small effusion present as well. 2.  Patient educated on treatment options available to him discussed both nonoperative and operative intervention. At this point I believe patient will benefit from an aspiration and corticosteroid injection however patient declined at this time. He does have an allergy to corticosteroids. An aspiration was also offered without injection however this was declined as well. 3.  Patient is educated likely benefit from physical therapy however he he chose to hold off on this as well. 4.  Patient is educated that his exam is concerning for possible medial meniscus tear and I would be happy to refer him to one of our knee surgeons for further evaluation since he cannot tolerate a corticosteroid injection and a right knee arthroscopy would be the gold standard for diagnosis and therapeutics. He was offered a referral to both Dr. Don Singh and Dr. Vaishali Hastings at this time. Patient states he will take the first available this will be scheduled at our checkout area. Please note that this chart was generated using voice recognition Dragon dictation software. Although every effort was made to ensure the accuracy of this automated transcription, some errors in transcription may have occurred.

## 2021-06-14 DIAGNOSIS — S83.241A TEAR OF MEDIAL MENISCUS OF RIGHT KNEE, CURRENT, UNSPECIFIED TEAR TYPE, INITIAL ENCOUNTER: ICD-10-CM

## 2021-06-14 RX ORDER — DICLOFENAC SODIUM 75 MG/1
75 TABLET, DELAYED RELEASE ORAL 2 TIMES DAILY WITH MEALS
Qty: 28 TABLET | Refills: 0 | Status: SHIPPED | OUTPATIENT
Start: 2021-06-14 | End: 2021-09-15

## 2021-06-14 NOTE — TELEPHONE ENCOUNTER
Do you want to give patient another refill on Voltaren, LRLAN 6/1/21 #28 for his knee pain. Patient scheduled to see Dr. Alon Rowland on 6/21/21.

## 2021-06-17 ENCOUNTER — OFFICE VISIT (OUTPATIENT)
Dept: PODIATRY | Age: 51
End: 2021-06-17
Payer: MEDICARE

## 2021-06-17 VITALS — WEIGHT: 273 LBS | BODY MASS INDEX: 36.18 KG/M2 | HEIGHT: 73 IN

## 2021-06-17 DIAGNOSIS — D23.71 BENIGN NEOPLASM OF SKIN OF LOWER LIMB, INCLUDING HIP, RIGHT: ICD-10-CM

## 2021-06-17 DIAGNOSIS — M79.671 BILATERAL FOOT PAIN: ICD-10-CM

## 2021-06-17 DIAGNOSIS — E11.51 TYPE II DIABETES MELLITUS WITH PERIPHERAL CIRCULATORY DISORDER (HCC): Primary | ICD-10-CM

## 2021-06-17 DIAGNOSIS — B35.1 DERMATOPHYTOSIS OF NAIL: ICD-10-CM

## 2021-06-17 DIAGNOSIS — I73.9 PERIPHERAL VASCULAR DISORDER (HCC): ICD-10-CM

## 2021-06-17 DIAGNOSIS — M79.672 BILATERAL FOOT PAIN: ICD-10-CM

## 2021-06-17 DIAGNOSIS — D23.72 BENIGN NEOPLASM OF SKIN OF LOWER LIMB, INCLUDING HIP, LEFT: ICD-10-CM

## 2021-06-17 PROCEDURE — 17110 DESTRUCTION B9 LES UP TO 14: CPT | Performed by: PODIATRIST

## 2021-06-17 PROCEDURE — 11721 DEBRIDE NAIL 6 OR MORE: CPT | Performed by: PODIATRIST

## 2021-06-18 NOTE — PROGRESS NOTES
[facility] PHYSICIANS  [facility]  [address] [place] [address]  Dept: [phone]  Dept Fax: [phone]    DIABETIC PROGRESS NOTE  Date of patient's visit: 06/17/2021  Patient's Name:  Brissa Gutiérrez YOB: 1970            Patient Care Team:  Herminia Freeman MD as PCP - General (Family Medicine)  Duglas Joe DPM as Physician (Podiatry)  Mary Lou DPM as Physician (Podiatry)  Anthony Horta MD as Consulting Physician (Pulmonary Disease)          Chief Complaint   Patient presents with    Diabetes    Peripheral Neuropathy    Nail Problem    Benign Neoplasm       Subjective:   Brissa Gutiérrez comes to clinic for Diabetes, Peripheral Neuropathy, Nail Problem, and Benign Neoplasm    he is a diabetic and states that he is doing well. Pt currently has complaint of thickened, elongated nails that they cannot manage by themselves. Pt's primary care physician is Herminia Freeman MD last seen 04/12/2021   Pt's last blood sugar was unknown . Pt also relates to painful skin spots to the bottom of both feet. Pt has tried pads and changing shoes but it has note helped the pain      Pt has a new complaint of NONE. Lab Results   Component Value Date    LABA1C 7.4 02/24/2021      Complains of numbness in the feet bilat.   Past Medical History:   Diagnosis Date    Bundle branch block, left     [facility]; last visit (virtual) April 2020    Diabetes [facility])     Diabetes mellitus ([facility] [address] 6/9/2017    Diabetic polyneuropathy associated with type 2 diabetes mellitus ([address] 5/4/2020    Gait difficulty 5/4/2020    GERD (gastroesophageal reflux disease)     History of echocardiogram 2016    [facility]; [facility]    Hyperlipidemia     Hypertension     PCP Connie Gerber NP, seen on 1/5/2020    Kidney calculi     Lumbar radiculopathy 6/9/2017    Mass of right elbow 5/28/2020    Muscle spasms of both lower extremities 5/4/2020    Neuropathic pain of both legs 5/4/2020    Non-ischemic cardiomyopathy (HCC)     Numbness and tingling in both hands 5/29/2019    Obstructive sleep apnea syndrome 06/09/2017    No machine    PONV (postoperative nausea and vomiting)     needs scop patch for OR    Prolonged emergence from general anesthesia     Restless legs syndrome 6/9/2017    Restless legs syndrome (RLS) 6/9/2017    Right median nerve neuropathy 5/28/2020    Severe obesity (BMI 35.0-39. 9) with comorbidity ([address] 7/31/2018    Ulnar neuropathy of right upper extremity 5/28/2020    Wears dentures     Upper & lower       Allergies   Allergen Reactions    Keflex [Cephalexin] Diarrhea    Codeine Itching    Corticosteroids Swelling     Injection site swelling    Doxycycline      GERD    Ibuprofen Other (See Comments)     GERD    Metformin And Related Diarrhea     Current Outpatient Medications on File Prior to Visit   Medication Sig Dispense Refill    diclofenac (VOLTAREN) 75 MG EC tablet TAKE 1 TABLET BY MOUTH 2 TIMES DAILY (WITH MEALS) FOR 14 DAYS 28 tablet 0    tiZANidine (ZANAFLEX) 4 MG tablet TAKE ONE TABLET BY MOUTH ONCE A DAY AT BEDTIME 90 tablet 0    omeprazole (PRILOSEC) 20 MG delayed release capsule TAKE ONE CAPSULE BY MOUTH ONCE A DAY BEFORE BREAKFAST 30 capsule 0    carbidopa-levodopa (SINEMET)  MG per tablet TAKE 1 TABLET BY MOUTH AT 8PM AND 11PM 180 tablet 1    amitriptyline (ELAVIL) 25 MG tablet Take 1.5 tab nightly 135 tablet 1    atorvastatin (LIPITOR) 20 MG tablet TAKE ONE TABLET BY MOUTH ONCE A DAY AT BEDTIME 30 tablet 0    clotrimazole-betamethasone (LOTRISONE) 1-0.05 % cream APPLY TOPICALLY TWICE A DAY 90 g 2    TRUEplus Lancets 30G MISC USE TO TEST BLOOD SUGAR 4 TIMES A  each 0    zinc oxide 20 % ointment MIX WITH KETOCONAZOLE 1:1 AND APPLY TO GROIN CREASES NIGHTLY 60 g 3    ketoconazole (NIZORAL) 2 % cream MIX WITH ZINC OXIDE 1:1 AND APPLY TO GROIN CREASES NIGHTLY 60 g 2    ASPIRIN LOW DOSE 81 MG EC tablet Take 1 tablet by mouth daily      latanoprost (XALATAN) 0.005 % ophthalmic solution Place 1 drop into both eyes daily      BENZOYL PEROXIDE 5 % external wash WASH CHEST AND BACK 1 TO 2 TIMES DAILY 227 g 3    Misc. Devices MISC 1 PAIR OF DIABETIC SHOES (1 LEFT/ 1 RIGHT)  1-3 PAIRS OF INSERTS (LEFT/ RIGHT) 2 each 0    vitamin D (ERGOCALCIFEROL) 1.25 MG (21034 UT) CAPS capsule Take 1 capsule by mouth once a week      GLUCAGON EMERGENCY 1 MG injection       isosorbide mononitrate (IMDUR) 30 MG extended release tablet Take 1 tablet by mouth daily      HYDRALAZINE HCL PO Take 50 mg by mouth daily      Alcohol Swabs (B-D SINGLE USE SWABS REGULAR) PADS USE BEFORE TESTING 4 TIMES A DAY PLUS BEFORE USING INSULIN (5 TIMES A DAY) 365 each 2    lisinopril (PRINIVIL;ZESTRIL) 5 MG tablet Take 5 mg by mouth nightly       metoprolol succinate (TOPROL XL) 25 MG extended release tablet Take 25 mg by mouth daily      nystatin (MYCOSTATIN) 575824 UNIT/GM cream Apply topically 2 times daily. 15 g 3    Semaglutide (OZEMPIC, 1 MG/DOSE, SC) Inject 1 mg into the skin once a week Every Sunday      Sulfacetamide Sodium 9.8 % LOTN Apply to nose and cheeks twice daily (Patient taking differently: Apply topically as needed Apply to nose and cheeks twice daily) 113 g 3    Insulin Pen Needle (COMFORT EZ PEN NEEDLES) 32G X 4 MM MISC Use 5 times daily. Change needle each time with Victoza, Basaglar, and Humalog 100 each 11    ammonium lactate (AMLACTIN) 12 % cream APPLY TOPICALLY AS NEEDED. (Patient taking differently: Apply topically as needed Apply topically as needed.) 280 g 2    Lancet Devices (SIMPLE DIAGNOSTICS LANCING DEV) MISC USE TO TEST BLOOD SUGAR 4 TIMES A DAY 1 each 3    PHARMACIST CHOICE ALCOHOL 70 % PADS USE BEFORE TESTING SUGAR (4 TIMES) PLUS BEFORE USING INSULIN (5 TIMES A DAY) 270 each 11     No current facility-administered medications on file prior to visit.        Review of Systems    Review of Systems:   History [facility] DIABETES FOOT EXAM    39973 - DC DESTRUCTION BENIGN LESIONS UP TO 14   4. Benign neoplasm of skin of lower limb, including hip, left  [facility] DIABETES FOOT EXAM    38650 - DC DESTRUCTION BENIGN LESIONS UP TO 14   5. Bilateral foot pain  [facility] DIABETES FOOT EXAM    37583 - DC DEBRIDEMENT OF NAILS, 6 OR MORE    82073 - DC DESTRUCTION BENIGN LESIONS UP TO 14   6. Peripheral vascular disorder (HCC)  [facility] DIABETES FOOT EXAM    33372 - DC DEBRIDEMENT OF NAILS, 6 OR MORE    89353 - DC DESTRUCTION BENIGN LESIONS UP TO 14             Q7   []Yes    []No                Q8   [x]Yes    []No                     Q9   []Yes    []No    Plan:   Pt was evaluated and examined. Patient was given personalized discharge instructions. The lesions were partially excised via 15 blade and Pyrogallic acid was applied under occlusion. The patient will leave in place for 24-48 hours and than remove. The patient tolerated the procedure well and without complication. Nails 1-10 were debrided sharply in length and thickness with a nipper and , without incident. Pt will follow up in 9 weeks or sooner if any problems arise. Diagnosis was discussed with the pt and all of their questions were answered in detail. Proper foot hygiene and care was discussed with the pt. Informed patient on proper diabetic foot care and importance of tight glycemic control. Patient to check feet daily and contact the office with any questions/problems/concerns.    Other comorbidity noted and will be managed by PCP.  6/17/2021    Electronically signed by Jordyn Diaz DPM on 6/18/2021 at 9:04 AM  6/17/2021

## 2021-06-21 ENCOUNTER — OFFICE VISIT (OUTPATIENT)
Dept: ORTHOPEDIC SURGERY | Age: 51
End: 2021-06-21
Payer: MEDICARE

## 2021-06-21 VITALS — WEIGHT: 273 LBS | HEIGHT: 73 IN | BODY MASS INDEX: 36.18 KG/M2

## 2021-06-21 DIAGNOSIS — M25.561 RIGHT KNEE PAIN, UNSPECIFIED CHRONICITY: ICD-10-CM

## 2021-06-21 DIAGNOSIS — S83.241A ACUTE MEDIAL MENISCUS TEAR OF RIGHT KNEE, INITIAL ENCOUNTER: Primary | ICD-10-CM

## 2021-06-21 PROCEDURE — 1036F TOBACCO NON-USER: CPT | Performed by: ORTHOPAEDIC SURGERY

## 2021-06-21 PROCEDURE — G8427 DOCREV CUR MEDS BY ELIG CLIN: HCPCS | Performed by: ORTHOPAEDIC SURGERY

## 2021-06-21 PROCEDURE — G8417 CALC BMI ABV UP PARAM F/U: HCPCS | Performed by: ORTHOPAEDIC SURGERY

## 2021-06-21 PROCEDURE — 99213 OFFICE O/P EST LOW 20 MIN: CPT | Performed by: ORTHOPAEDIC SURGERY

## 2021-06-21 PROCEDURE — 3017F COLORECTAL CA SCREEN DOC REV: CPT | Performed by: ORTHOPAEDIC SURGERY

## 2021-06-21 RX ORDER — ETODOLAC 400 MG/1
400 TABLET, FILM COATED ORAL 2 TIMES DAILY
Qty: 28 TABLET | Refills: 0 | Status: SHIPPED | OUTPATIENT
Start: 2021-06-21 | End: 2022-04-21

## 2021-06-21 NOTE — PROGRESS NOTES
Orthopedic Knee Encounter Note     Chief complaint: Right knee pain    HPI: Jesus Lawrence is a 45 y.o. male who presents for evaluation of his right knee. About a month ago he states that he was working on a car and felt some popping sensations in the knee. He did not think much about it at that time but woke up the following day with quite a bit of pain in his knee. Over the course of the day he developed progressively worsening swelling in this knee. By the following day he had to go to the emergency department for evaluation. Since then he was seen by Emily Min PA-C and was started on Voltaren. Unfortunately he was unable to receive a cortisone injection due to the fact that he is allergic to this. He has failed to respond to his prescription NSAID and so he was referred to my clinic for further evaluation and treatment. At this time his pain is primarily localized to the medial side of his knee. He describes it as being fairly constant. It is worse at night trying to sleep. It also affects his weightbearing activity. He describes having some persistent swelling in this knee but denies having any locking or catching sensations at this time.     Previous treatment:    NSAIDs: Voltaren    Injections:  None    Physical therapy: No    Surgeries: None    Review of Systems:     Constitution: no fever or chills   Pain level: 5/10  Musculoskeletal: As noted in the HPI   Neurologic: no neurologic symptoms    Past Medical History  Eunice Ramos  has a past medical history of Bundle branch block, left, Diabetes ([address] Diabetes mellitus ([address] Diabetic polyneuropathy associated with type 2 diabetes mellitus ([address] Gait difficulty, GERD (gastroesophageal reflux disease), History of echocardiogram, Hyperlipidemia, Hypertension, Kidney calculi, Lumbar radiculopathy, Mass of right elbow, Muscle spasms of both lower extremities, Neuropathic pain of both legs, Non-ischemic cardiomyopathy ([address] Numbness and tingling in both hands, Obstructive sleep apnea syndrome, PONV (postoperative nausea and vomiting), Prolonged emergence from general anesthesia, Restless legs syndrome, Restless legs syndrome (RLS), Right median nerve neuropathy, Severe obesity (BMI 35.0-39. 9) with comorbidity ([address] Ulnar neuropathy of right upper extremity, and Wears dentures. Past Surgical History  Coy Garibay  has a past surgical history that includes shoulder surgery (Right); Ankle surgery (Right); Glaucoma surgery (Bilateral, 12/2018); Colonoscopy (1990); Cardiac catheterization (01/2020); Injection Procedure For Sacroiliac Joint (Bilateral, 2/21/2020); sinus surgery; Carpal tunnel release (Right, 05/28/2020); and Carpal tunnel release (Right, 5/28/2020).     Current Medications  Current Outpatient Medications   Medication Sig Dispense Refill    etodolac (LODINE) 400 MG tablet Take 1 tablet by mouth 2 times daily for 14 days 28 tablet 0    diclofenac (VOLTAREN) 75 MG EC tablet TAKE 1 TABLET BY MOUTH 2 TIMES DAILY (WITH MEALS) FOR 14 DAYS 28 tablet 0    tiZANidine (ZANAFLEX) 4 MG tablet TAKE ONE TABLET BY MOUTH ONCE A DAY AT BEDTIME 90 tablet 0    omeprazole (PRILOSEC) 20 MG delayed release capsule TAKE ONE CAPSULE BY MOUTH ONCE A DAY BEFORE BREAKFAST 30 capsule 0    carbidopa-levodopa (SINEMET)  MG per tablet TAKE 1 TABLET BY MOUTH AT 8PM AND 11PM 180 tablet 1    amitriptyline (ELAVIL) 25 MG tablet Take 1.5 tab nightly 135 tablet 1    atorvastatin (LIPITOR) 20 MG tablet TAKE ONE TABLET BY MOUTH ONCE A DAY AT BEDTIME 30 tablet 0    clotrimazole-betamethasone (LOTRISONE) 1-0.05 % cream APPLY TOPICALLY TWICE A DAY 90 g 2    TRUEplus Lancets 30G MISC USE TO TEST BLOOD SUGAR 4 TIMES A  each 0    zinc oxide 20 % ointment MIX WITH KETOCONAZOLE 1:1 AND APPLY TO GROIN CREASES NIGHTLY 60 g 3    ketoconazole (NIZORAL) 2 % cream MIX WITH ZINC OXIDE 1:1 AND APPLY TO GROIN CREASES NIGHTLY 60 g 2    ASPIRIN LOW DOSE 81 MG EC tablet Take 1 tablet by mouth daily      latanoprost (XALATAN) 0.005 % ophthalmic solution Place 1 drop into both eyes daily      BENZOYL PEROXIDE 5 % external wash WASH CHEST AND BACK 1 TO 2 TIMES DAILY 227 g 3    Misc. Devices MISC 1 PAIR OF DIABETIC SHOES (1 LEFT/ 1 RIGHT)  1-3 PAIRS OF INSERTS (LEFT/ RIGHT) 2 each 0    vitamin D (ERGOCALCIFEROL) 1.25 MG (70442 UT) CAPS capsule Take 1 capsule by mouth once a week      GLUCAGON EMERGENCY 1 MG injection       isosorbide mononitrate (IMDUR) 30 MG extended release tablet Take 1 tablet by mouth daily      HYDRALAZINE HCL PO Take 50 mg by mouth daily      Alcohol Swabs (B-D SINGLE USE SWABS REGULAR) PADS USE BEFORE TESTING 4 TIMES A DAY PLUS BEFORE USING INSULIN (5 TIMES A DAY) 365 each 2    lisinopril (PRINIVIL;ZESTRIL) 5 MG tablet Take 5 mg by mouth nightly       metoprolol succinate (TOPROL XL) 25 MG extended release tablet Take 25 mg by mouth daily      nystatin (MYCOSTATIN) 606401 UNIT/GM cream Apply topically 2 times daily. 15 g 3    Semaglutide (OZEMPIC, 1 MG/DOSE, SC) Inject 1 mg into the skin once a week Every Sunday      Sulfacetamide Sodium 9.8 % LOTN Apply to nose and cheeks twice daily (Patient taking differently: Apply topically as needed Apply to nose and cheeks twice daily) 113 g 3    Insulin Pen Needle (COMFORT EZ PEN NEEDLES) 32G X 4 MM MISC Use 5 times daily. Change needle each time with Victoza, Basaglar, and Humalog 100 each 11    ammonium lactate (AMLACTIN) 12 % cream APPLY TOPICALLY AS NEEDED. (Patient taking differently: Apply topically as needed Apply topically as needed.) 280 g 2    Lancet Devices (SIMPLE DIAGNOSTICS LANCING DEV) MISC USE TO TEST BLOOD SUGAR 4 TIMES A DAY 1 each 3    PHARMACIST CHOICE ALCOHOL 70 % PADS USE BEFORE TESTING SUGAR (4 TIMES) PLUS BEFORE USING INSULIN (5 TIMES A DAY) 270 each 11     No current facility-administered medications for this visit. Allergies  Allergies have been reviewed.   Kevin Arshad is allergic to keflex [cephalexin], codeine, corticosteroids, doxycycline, ibuprofen, and metformin and related. Social History  Naldo Luciano  reports that he quit smoking about 11 years ago. His smoking use included cigarettes. He started smoking about 33 years ago. He has a 60.00 pack-year smoking history. He has never used smokeless tobacco. He reports current alcohol use. He reports previous drug use. Family History  Davidson's family history includes Alcohol Abuse in his father; Dementia in his father; Depression in his mother; Diabetes in his maternal grandfather and maternal grandmother; Heart Attack in his father; Heart Disease in his father; Heart Surgery in his father; High Cholesterol in his father and mother; Naomy Jews in his father; No Known Problems in his half-brother, half-sister, paternal grandfather, and paternal grandmother; Thyroid Disease in his daughter. Physical Exam:     Ht 6' 1\" (1.854 m) Comment: per pt  Wt 273 lb (123.8 kg) Comment: per pt  BMI 36.02 kg/m²    General Appearance: alert, well appearing, and in no distress  Mental Status: alert, oriented to person, place, and time  Gait: antalgic  Hips: Good pain-free ROM without crepitation    Knee: Bilateral    Skin: warm and dry, no rash or erythema.   Mild effusion in the right knee  Vasculature: 2+ pedal pulses bilaterally  Neuro: Sensation grossly intact to light touch diffusely  Alignment: Normal  Tenderness: Tender to palpation along the medial joint line of the right knee    ROM: (Degrees)    Right   A P   Left   A P    Extension  0    Extension  0   Flexion   115    Flexion   120      Crepitation  No    Crepitation  No      Muscle strength:    Right       Left    Flexion   5    Flexion   5  Extension  5    Extension  5  SLR   5    SLR   5    Extensor lag   n    Extensor lag  n      Special testing:    Right          Left    y    Pain with deep knee flexion   n  n    Patellar grind     n  n    Patellar apprehension    n  n    Patellar glide     n    n    Lachman     n  n    Anterior drawer    n  n    Pivot shift     n  n    Posterior drawer    n  n    Dial test     n  n    Posterolateral drawer    n  n    Posterior Sag     n  n    MCL      n  n    LCL      n    y    Medial joint line tenderness   n  n    Lateral joint line tenderness   n  y    Appley's     n      Imaging:  Xrays: 4 views of the right knee obtained on 6/21/2021 were independently reviewed   Indications: Right knee pain  Findings: Normal tricompartmental joint spaces without obvious fracture, dislocation, subluxation or obvious osseous abnormality. Impression: Normal-appearing right knee radiographs    MRI: MRI of the right knee reviewed from 5/25/2021 demonstrates what appears to be a horizontal cleavage tear involving the medial meniscus. No obvious chondral or ligamentous injury. Impression/Plan:     Sinan Kyle is a 45 y.o. old male with right knee pain that appears to be consistent with a torn medial meniscus as demonstrated on his MRI. I had a discussion with the patient and his wife educating them about the nature and extent of his injury and discussing treatment options available to him including continued conservative management and surgical intervention by way of an arthroscopic partial medial meniscectomy. He is interested in surgery but states that his wife has upcoming hip surgery and so would like to hold off on any surgical intervention until she is taking care of. Consequently today a prescription for physical therapy was provided and he was placed on a 2-week course of Lodine to see if this would be of any benefit to him providing pain relief as opposed to what he has tried thus far. I will have him follow-up my clinic in 2 to 3 months for reevaluation but he was encouraged to return or call earlier with questions or concerns.       NA = Not assessed  n = No  y = Yes  SLR = Straight leg raise  MCL = Medial collateral ligament  LCL = Lateral collateral ligament

## 2021-07-12 ENCOUNTER — HOSPITAL ENCOUNTER (OUTPATIENT)
Dept: PHYSICAL THERAPY | Age: 51
Setting detail: THERAPIES SERIES
Discharge: HOME | End: 2021-07-12
Payer: MEDICARE

## 2021-07-12 PROCEDURE — 97110 THERAPEUTIC EXERCISES: CPT

## 2021-07-12 PROCEDURE — 97161 PT EVAL LOW COMPLEX 20 MIN: CPT

## 2021-07-12 NOTE — CONSULTS
[x] [facility]  Outpatient Physical Therapy  [address]  Phone: [phone]  Fax: [phone] [] [facility] at [address] [place] [address]  Phone: [phone]   Fax: [phone]     Physical Therapy Evaluation    Date:  2021  Patient: Italia Bullock  : 1970  MRN: [id number]  Physician: Blu Flynn MD   Insurance: [employer] 30x  Medical Diagnosis:  Acute medial meniscus tear of right knee S83.241A   Rehab Codes: M25.561, M25.661  Onset Date: 21                                Next 's appt: TBD     Subjective:   CC:Patient reports ongoing R knee pain, swelling, and limited function since his knee injury. Patient is unable to squat, climb stairs, or walk long distances. Patient states he owns a cane but ford not like to use it. Patient states he believes he will need to have surgery but would like to wait until after his wife's hip surgery and recovery. HPI: Patient reports he was squatting and kneeling while working on his Jeep when he felt a popping sensation in his R knee. R medial knee pain and difficulty walking began shortly after. PMHx: [] Unremarkable [x] Diabetes [x] HTN  [] Pacemaker   [x] MI/Heart Problems [] Cancer [] Arthritis [] Asthma                         [x] refer to full medical chart  In [facility]  [] Other:        Comorbidities:   [x] Obesity [] Dialysis  [] Other:   [] Asthma/COPD [] Dementia [] Other:   [] Stroke [] Sleep apnea [] Other:   [] Vascular disease [] Rheumatic disease [] Other:     Tests: [] X-Ray: [x] MRI:  [] Other:  MRI: MRI of the right knee reviewed from 2021 demonstrates what appears to be a horizontal cleavage tear involving the medial meniscus. No obvious chondral or ligamentous injury.     Medications: [x] Refer to full medical record [] None [] Other:  Allergies:      [x] Refer to full medical record  [] None [] Other:    Working:  [] Full-time  [] Part-time  [] Off d/t condition  [] Retired  [x] Disability  [] N/A  Job/Employer:    Pain:  [x] Yes  [] No Location:R knee Pain Rating: (0-10 scale) 7/10  Pain altered Tx:  [] Yes  [x] No  Action:    Objective: p = pain, L = Lacks      ROM  ° A/P STRENGTH    Left Right Left Right   Hip Flexion        Ext       Abd       Add       ER       IR       Knee Flex 120 120,p 5/5 4/5,p   Ext  0 L 15,p 5/5 4/5,p   Ankle DF       PF        Inversion       Eversion          Special Tests:   Positive: Deep knee flexion  R medial knee pain         Negative:     Functional Tests: Unable to attempt squat or step up. OBSERVATION Comments   Posture No Deficit    Joint Alignment R knee held in slight flexion    Gait R antalgia    Palpation Medial joint line tenderness   Edema Mild R knee   Neurological No Deficit      Assessment: Patient presents with R knee pain and dysfunction due to medial meniscus tear. Patient may benefit from PT to restore pain free, ROM, strength, and functional mobility. Problems:    [x] ? Pain: 7/10 R knee  [x] ? ROM:R knee extension and flexion   [x] ? Flexibility:hamstrings  [x] ? Strength:quadriceps, hamstrings   [x] ? Function: LEFS score 33% functional   [] Other:    STG: (to be met in 8 treatments)  1. ? Pain: 3/10 R knee pain with ambulation. 2. ? ROM: R knee flexion 0-125 degrees to improve stair climbing. 3. ? Strength: 4+/5 R knee flexion and extension to improve knee stability   4. ? Function:LEFS score to 53% functional or better to improve ADLs. 5. Independent with Home Exercise Programs    LTG: (to be met in 12 treatments)  1. Patient is able to squat with less pain. 2. Patient is able to climb stairs with decreased pain. Patient goals: Restore R knee function and reduce pain.      Rehab Potential:  [x] Good  [] Fair  [] Poor   Suggested Professional Referral:  [x] No  [] Yes:  Barriers to Goal Achievement[de-identified]  [x] No  [] Yes:  Domestic Concerns:  [x] No  [] Yes:    Pt. Education:  [x] Plans/Goals, Risks/Benefits discussed  [x] Home exercise program: See chart below  Method of Education: [x] Verbal  [x] Demo  [x] Written  Comprehension of Education:  [x] Verbalizes understanding. [x] Demonstrates understanding. [x] Needs Review. [] Demonstrates/verbalizes understanding of HEP/Ed previously given. Treatment Plan:  [x] Therapeutic Exercise   24694  [x] Vasopneumatic cold with compression  15531    [x] Therapeutic Activity  97310 [x] Cold/hotpack    [x] Gait Training   74332 [] Lumbar/Cervical Traction  [id number]   [x] Neuromuscular Re-education  [id number] [] Electrical Stimulation Unattended  90604   [x] Manual Therapy    25697 [] Electrical Stimulation Attended  [id number]   [] Iontophoresis: 4 mg/mL Dexamethasone Sodium Phosphate  mAmin  36221 []  Medication allergies reviewed for use of   Dexamethasone Sodium Phosphate 4mg/ml with iontophoresis treatments. Pt is not allergic. Frequency:  2 x/week for 12 visits    Todays Treatment:  Precautions:  Modalities:   Exercises:  Access Code: [email or web address]  URL: [email or web address]  Date: 07/12/2021  Prepared by: Candy Velazco    Exercises  Supine Heel Slide - 1 x daily - 4 x weekly - 2 sets - 10 reps  Supine Straight Leg Raises - 1 x daily - 4 x weekly - 2 sets - 10 reps  Supine Short Arc Quad - 1 x daily - 4 x weekly - 2 sets - 10 reps  Seated Long Arc Quad - 1 x daily - 4 x weekly - 2 sets - 10 reps  Seated Heel Raise - 1 x daily - 4 x weekly - 2 sets - 10 reps    Specific Instructions for next treatment: R knee ROM and strengthening exercise to patient tolerance.       Evaluation Complexity:  History (Personal factors, comorbidities) [] 0 [x] 1-2 [] 3+   Exam (limitations, restrictions) [] 1-2 [x] 3 [] 4+   Clinical presentation (progression) [] Stable [x] Evolving  [] Unstable   Decision Making [x] Low [] Moderate [] High    [x] Low Complexity [] Moderate Complexity [] High Complexity       Treatment Charges: Mins Units   [x] Evaluation       [x]  Low []  Moderate       []  High 25 1   []  Modalities     [x]  Ther Exercise 15 1   []  Manual Therapy     []  Ther Activities     []  Aquatics     []  Vasocompression     []  Other       TOTAL TREATMENT TIME: 40    Time in:1000 am    Time out:1040 am     Electronically signed by: Leland Seo PT

## 2021-07-15 ENCOUNTER — TELEPHONE (OUTPATIENT)
Dept: NEUROLOGY | Age: 51
End: 2021-07-15

## 2021-07-15 ENCOUNTER — HOSPITAL ENCOUNTER (OUTPATIENT)
Dept: PHYSICAL THERAPY | Age: 51
Setting detail: THERAPIES SERIES
Discharge: HOME | End: 2021-07-15
Payer: MEDICARE

## 2021-07-15 PROCEDURE — 97110 THERAPEUTIC EXERCISES: CPT

## 2021-07-15 PROCEDURE — 97016 VASOPNEUMATIC DEVICE THERAPY: CPT

## 2021-07-15 NOTE — TELEPHONE ENCOUNTER
Beba Gonzales called in. He said his handicapped parking placard will be expiring and he needs a new statement from Dr Ralf Balderas. He said it can be mailed to him when it is ready.

## 2021-07-15 NOTE — LETTER
[facility] Neurology Specialist  [address] [facility] [address]  Phone: [phone]  Fax: [phone], [address] [facility]     August 17, 2021     Patient: Ashish Dsouza   YOB: 1970   Date of Visit: 7/15/2021       To Whom It May Concern: It is my medical opinion that Mariajose Morrison requires a disability parking placard for the following reasons:  He has limited walking ability due to a neurologic condition, neuropathy. Duration of need: permanent or greater than 5 years. If you have any questions or concerns, please don't hesitate to call.             Ketan Courtney, CNP

## 2021-07-15 NOTE — FLOWSHEET NOTE
[x] [address] [facility] TWELVESTEP [facility] &  Therapy  [address]  [phone]  F: [phone] [] [address]  [address]   [address]  P: [phone]  F: [phone] [] [address] &  Therapy  [address] [facility]  P: [phone]  F: [phone] [] [address]  P: [phone]  F: [phone] [] [address]  [facility]   [address]   [place]: [phone]  F: [phone]      Physical Therapy Daily Treatment Note    Date:  7/15/2021  Patient Name:  Italia Bullock    :  1970  MRN: [id number]  Physician: Blu Flynn MD                     Insurance: [place] [employer]age 30x  Medical Diagnosis:   Acute medial meniscus tear of right knee S83.241A          Rehab Codes: M25.561, M25.661  Onset Date: 21                                Next 's appt: TBD     Visit# / total visits:      Cancels/No Shows: 0/0    Subjective:    Pain:  [x]? Yes  []? No   Location:R knee          Pain Rating: (0-10 scale) 10/10  Pain altered Tx:  []? Yes  [x]? No  Action:  Comments: Patient arrives stating he is hurting worse today, up to 10/10 pain. States he has been more active at home since his wife just had hip surgery yesterday and has been up and down stairs more and the 1/8th mile walk from the parking garage was nearly impossible. States he did exercises at home last night and they increased pain in his knee that he does not want to complete them and does not know if he can continue to come to therapy.     Objective:  Modalities: vaso to R knee, 15 min, 36°, low pressure  Precautions:  Exercises:  Exercise Reps/ Time Weight/ Level Comments   Scifit 5m  L2 Patient completed 3 minutes               Seated       LAQ      Marches      Heel slides      HS stretch Attempted on stool               Supine      Heel slides 15x  Orange slider   HS stretch 3x15\"  Green rope, min assist for lift   SLR      SAQ                              Other:      Treatment Charges: Mins Units   []  Modalities     [x]  Ther Exercise 20 1   []  Manual Therapy     []  Ther Activities     []  Aquatics     [x]  Vasocompression 15 1   []  Other     Total Treatment time 35 2       Assessment: [] Progressing toward goals. [] No change. [x] Other: Patient with minimal tolerance to therapeutic exercises this date and hesitant to complete or trial exercises suggested by therapist in anticipation of pain or exacerbation of symptoms. Educated patient that trialing exercises and stretches could help reduce current level of pain and patient agreeable to those completed above, but stated only continued or increased pain in R knee. Educated on use of vasocompression for pain/symptom relief with patient agreeable to trial in clinic this date. Removed wedge with three minutes left due to discomfort during vaso, but overall patient noted minimal improvement post vaso. Educated to continue with home exercises as able and continue with icing for symptom relief with good verbal understanding. [x] Patient would continue to benefit from skilled physical therapy services in order to: restore pain free, ROM, strength, and functional mobility. STG: (to be met in 8 treatments)  1. ? Pain: 3/10 R knee pain with ambulation. 2. ? ROM: R knee flexion 0-125 degrees to improve stair climbing. 3. ? Strength: 4+/5 R knee flexion and extension to improve knee stability   4. ? Function:LEFS score to 53% functional or better to improve ADLs. 5. Independent with Home Exercise Programs     LTG: (to be met in 12 treatments)  1. Patient is able to squat with less pain. 2. Patient is able to climb stairs with decreased pain.      Patient goals: Restore R knee function and reduce pain.        Pt.  Education:  [x] Yes [] No  [x] Reviewed Prior HEP/Ed  Method of Education: [x] Verbal  [x] Demo  [] Written  Comprehension of Education:  [x] Verbalizes understanding. [x] Demonstrates understanding. [x] Needs review. [] Demonstrates/verbalizes HEP/Ed previously given. Plan: [x] Continue current frequency toward long and short term goals.     [x] Specific Instructions for subsequent treatments: resume supine and seated exercises and progress as able    Frequency:  2 x/week for 12 visits      Time In: 1000 am           Time Out: 1035 am    Electronically signed by:  Hector Montano PTA

## 2021-07-19 ENCOUNTER — HOSPITAL ENCOUNTER (OUTPATIENT)
Dept: PHYSICAL THERAPY | Age: 51
Setting detail: THERAPIES SERIES
Discharge: HOME | End: 2021-07-19
Payer: MEDICARE

## 2021-07-19 PROCEDURE — 97110 THERAPEUTIC EXERCISES: CPT

## 2021-07-19 PROCEDURE — 97016 VASOPNEUMATIC DEVICE THERAPY: CPT

## 2021-07-19 NOTE — FLOWSHEET NOTE
[x] [facility]) [facility] TWELVESTEP [facility] &  Therapy  [address]  [phone]  F: [phone] [] [address]  [address]   [address]  P: [phone]  F: [phone] [] [address] &  Therapy  [address]  P: [phone]  F: [phone] [] [address]  P: [phone]  F: [phone] [] [address]  [facility]   [address]   [place]: [phone]  F: [phone]      Physical Therapy Daily Treatment Note    Date:  2021  Patient Name:  Melissa Gastelum    :  1970  MRN: [id number]  Physician: Jesse Cm MD                     Insurance: [place] [employer]age 30x  Medical Diagnosis:   Acute medial meniscus tear of right knee S83.241A          Rehab Codes: M25.561, M25.661  Onset Date: 21                                Next 's appt: TBD   Visit# / total visits: 3/12     Cancels/No Shows: 0/0    Subjective:    Pain:  [x]? Yes  []? No   Location:R knee          Pain Rating: (0-10 scale) 10/10  Pain altered Tx:  [x]? Yes  []? No  Action: Limited exercise progress  Comments: Patient still reports high level of knee pain. Has been helping his wife ambulate around the house from her recent hip surgery. Stairs are still very difficult.      Objective:  Modalities: vaso to R knee, 15 min, 36°, low pressure  Precautions:  Exercises:  Exercise Reps/ Time Weight/ Level Comments   Scifit 5 m  L2 Patient completed 3 minutes               Seated       LAQ 20x     Calf raise  20x     Marches 20x     Heel slides 15x  Very difficult    HS stretch 10x5\"  Attempted on stool               Supine      Heel slides 15x  Orange slider   HS stretch 3x15\"  Green rope, min assist for lift   SLR 20x     SAQ 20x                             Other:      Treatment Charges: Mins Units   []  Modalities     [x]  Ther Exercise 25 2   []  Manual Therapy     []  Ther Activities     []  Aquatics     [x]  Vasocompression 15 1   []  Other     Total Treatment time 40        Assessment: [] Progressing toward goals. [] No change. [x] Other: Slightly improved tolerance to exercise this date, but all flexion based exercise remains very painful. [x] Patient would continue to benefit from skilled physical therapy services in order to: restore pain free, ROM, strength, and functional mobility. STG: (to be met in 8 treatments)  1. ? Pain: 3/10 R knee pain with ambulation. 2. ? ROM: R knee flexion 0-125 degrees to improve stair climbing. 3. ? Strength: 4+/5 R knee flexion and extension to improve knee stability   4. ? Function:LEFS score to 53% functional or better to improve ADLs. 5. Independent with Home Exercise Programs     LTG: (to be met in 12 treatments)  1. Patient is able to squat with less pain. 2. Patient is able to climb stairs with decreased pain.      Patient goals: Restore R knee function and reduce pain.        Pt. Education:  [x] Yes  [] No  [x] Reviewed Prior HEP/Ed  Method of Education: [x] Verbal  [x] Demo  [] Written  Comprehension of Education:  [x] Verbalizes understanding. [x] Demonstrates understanding. [x] Needs review. [] Demonstrates/verbalizes HEP/Ed previously given. Plan: [x] Continue current frequency toward long and short term goals.     [x] Specific Instructions for subsequent treatments: add standing ex    Frequency:  2 x/week for 12 visits      Time In: 1000 am           Time Out: 5218 am    Electronically signed by:  Kelsie Forte, PT

## 2021-07-21 ENCOUNTER — HOSPITAL ENCOUNTER (OUTPATIENT)
Dept: PHYSICAL THERAPY | Age: 51
Setting detail: THERAPIES SERIES
Discharge: HOME | End: 2021-07-21
Payer: MEDICARE

## 2021-07-21 PROCEDURE — 97110 THERAPEUTIC EXERCISES: CPT

## 2021-07-21 PROCEDURE — 97016 VASOPNEUMATIC DEVICE THERAPY: CPT

## 2021-07-21 NOTE — FLOWSHEET NOTE
[x] [address]  - [facility] TWELVE-STEP [facility] &  Therapy  [address]  [phone]  F: [phone] [] [address]  [address]   [address]  P: [phone]  F: [phone] [] [address] &  Therapy  [address] [facility] [address] [address]  P: [phone]  F: [phone] [] [address]  P: [phone]  F: [phone] [] [address]  [facility]   [address]   [place]: [phone]  F: [phone]      Physical Therapy Daily Treatment Note    Date:  2021  Patient Name:  Ashish Dsouza    :  1970  MRN: [id number]  Physician: Windy Leggett MD                     Insurance: [place] Advantage 30x  Medical Diagnosis:   Acute medial meniscus tear of right knee S83.241A          Rehab Codes: M25.561, M25.661  Onset Date: 21                                Next 's appt: TBD   Visit# / total visits: /     Cancels/No Shows: 0/0    Subjective:    Pain:  [x]? Yes  []? No   Location:R knee          Pain Rating: (0-10 scale) 10/10  Pain altered Tx:  [x]? Yes  []? No  Action: Limited exercise progress  Comments: No change in pain. Patient fell in his yard yesterday when his R knee gave out on him. Patient believes surgery is inevitable.      Objective:  Modalities: vaso to R knee, 15 min, 36°, low pressure  Precautions:  Exercises:  Exercise Reps/ Time Weight/ Level Comments   Scifit 5 m  L2 Patient completed 3 minutes         Standing       Calf stretc      Hip abduction  15x     Hip extension  15x     calf raises  15x     Marching  15x     HS curls  15x           Seated       LAQ 20x     Calf raise  20x     Marches 20x     Heel slides 15x  Very difficult    HS stretch 15x  Attempted on stool               Supine      Heel slides 15x  Orange slider   HS stretch 3x15\" Green rope, min assist for lift   SLR 20x     SAQ 20x                             Other:      Treatment Charges: Mins Units   []  Modalities     [x]  Ther Exercise 30 2   []  Manual Therapy     []  Ther Activities     []  Aquatics     [x]  Vasocompression 15 1   []  Other     Total Treatment time 45 3       Assessment: [x] Progressing toward goals. [x] No change. Patient still presents with poor tolerance to all flexion based exercise. Knee function has not progressed. [x] Other: Slightly improved tolerance to exercise this date, but all flexion based exercise remains very painful. [x] Patient would continue to benefit from skilled physical therapy services in order to: restore pain free, ROM, strength, and functional mobility. STG: (to be met in 8 treatments)  1. ? Pain: 3/10 R knee pain with ambulation. 2. ? ROM: R knee flexion 0-125 degrees to improve stair climbing. 3. ? Strength: 4+/5 R knee flexion and extension to improve knee stability   4. ? Function:LEFS score to 53% functional or better to improve ADLs. 5. Independent with Home Exercise Programs     LTG: (to be met in 12 treatments)  1. Patient is able to squat with less pain. 2. Patient is able to climb stairs with decreased pain.      Patient goals: Restore R knee function and reduce pain.        Pt. Education:  [x] Yes  [] No  [x] Reviewed Prior HEP/Ed  Method of Education: [x] Verbal  [x] Demo  [] Written  Comprehension of Education:  [x] Verbalizes understanding. [x] Demonstrates understanding. [x] Needs review. [] Demonstrates/verbalizes HEP/Ed previously given. Plan: [x] Continue current frequency toward long and short term goals.     [x] Specific Instructions for subsequent treatments: add calf stretch     Frequency:  2 x/week for 12 visits      Time In: 1000 am           Time Out: 1045 am    Electronically signed by:  Linda Andujar PT

## 2021-08-12 ENCOUNTER — VIRTUAL VISIT (OUTPATIENT)
Dept: PULMONOLOGY | Age: 51
End: 2021-08-12
Payer: MEDICARE

## 2021-08-12 DIAGNOSIS — G25.81 RESTLESS LEGS SYNDROME (RLS): ICD-10-CM

## 2021-08-12 DIAGNOSIS — G47.33 OSA (OBSTRUCTIVE SLEEP APNEA): Primary | ICD-10-CM

## 2021-08-12 DIAGNOSIS — Z87.891 SMOKING HISTORY: ICD-10-CM

## 2021-08-12 DIAGNOSIS — E66.09 OBESITY DUE TO EXCESS CALORIES, UNSPECIFIED OBESITY SEVERITY: ICD-10-CM

## 2021-08-12 DIAGNOSIS — I10 ESSENTIAL HYPERTENSION: ICD-10-CM

## 2021-08-12 DIAGNOSIS — R09.82 POST-NASAL DRIP: ICD-10-CM

## 2021-08-12 DIAGNOSIS — J44.9 CHRONIC OBSTRUCTIVE PULMONARY DISEASE, UNSPECIFIED COPD TYPE (HCC): ICD-10-CM

## 2021-08-12 PROCEDURE — 99214 OFFICE O/P EST MOD 30 MIN: CPT | Performed by: INTERNAL MEDICINE

## 2021-08-12 RX ORDER — IPRATROPIUM BROMIDE 42 UG/1
2 SPRAY, METERED NASAL 4 TIMES DAILY
Qty: 1 BOTTLE | Refills: 3 | Status: SHIPPED | OUTPATIENT
Start: 2021-08-12 | End: 2021-10-14

## 2021-08-17 NOTE — PROGRESS NOTES
PULMONARY OP progress note    FOR TELEPHONE VISITS PLEASE COMPLETE THE FOLLOWING    Consent:  She and/or health care decision maker is aware that that she may receive a bill for this telephone service, depending on her insurance coverage, and has provided verbal consent to proceed: Yes      I affirm this is a Patient Initiated Episode with an Established Patient who has not had a related appointment within my department in the past 7 days or scheduled within the next 24 hours. Total Time: minutes: 21-30 minutes    Note: not billable if this call serves to triage the patient into an appointment for the relevant concern      REFERRED BY: Dustin Robert MD    REASON FOR CONSULTATION: Sleep apnea    Patient is being seen in follow-up for-  1. BRIANNE (obstructive sleep apnea)    2. Obesity due to excess calories, unspecified obesity severity    3. Essential hypertension    4. Restless legs syndrome (RLS)    5. Smoking history    6. Chronic obstructive pulmonary disease, unspecified COPD type ([facility] [address]    7.  Post-nasal drip          HISTORY OF PRESENT ILLNESS:    Janette Navarro is a 45y.o. year old male here for evaluation of sleep apnea  Patient could not use the BiPAP secondary to sinus problems  He also feels the pressure might be high  Continues to have snoring with observed apnea with daytime sleepiness and fatigue and tiredness  No motor vehicle accidents  In addition to the symptoms with sleep apnea, patient does not have any narcolepsy symptoms nor hypothyroidism symptoms  Denied any shortness of breath or wheezing  Not much coughing or sputum production  No hemoptysis  No orthopnea or PND  No chest pain or pressure  No fever chills or night sweats  No weight loss or loss of appetite  Patient has nasal discharge  Has occasional restless leg syndrome and his medications have been adjusted by his neurologist        PAST MEDICAL HISTORY:       Diagnosis Date    Bundle branch block, left     [facility] Corticosteroids Swelling     Injection site swelling    Doxycycline      GERD    Ibuprofen Other (See Comments)     GERD    Metformin And Related Diarrhea       MEDICATIONS:   Current Outpatient Medications   Medication Sig Dispense Refill    ipratropium (ATROVENT) 0.06 % nasal spray 2 sprays by Each Nostril route 4 times daily 1 Bottle 3    tiZANidine (ZANAFLEX) 4 MG tablet TAKE ONE TABLET BY MOUTH ONCE A DAY AT BEDTIME 90 tablet 0    omeprazole (PRILOSEC) 20 MG delayed release capsule TAKE ONE CAPSULE BY MOUTH ONCE A DAY BEFORE BREAKFAST 30 capsule 0    carbidopa-levodopa (SINEMET)  MG per tablet TAKE 1 TABLET BY MOUTH AT 8PM AND 11PM 180 tablet 1    atorvastatin (LIPITOR) 20 MG tablet TAKE ONE TABLET BY MOUTH ONCE A DAY AT BEDTIME 30 tablet 0    clotrimazole-betamethasone (LOTRISONE) 1-0.05 % cream APPLY TOPICALLY TWICE A DAY 90 g 2    TRUEplus Lancets 30G MISC USE TO TEST BLOOD SUGAR 4 TIMES A  each 0    zinc oxide 20 % ointment MIX WITH KETOCONAZOLE 1:1 AND APPLY TO GROIN CREASES NIGHTLY 60 g 3    ketoconazole (NIZORAL) 2 % cream MIX WITH ZINC OXIDE 1:1 AND APPLY TO GROIN CREASES NIGHTLY 60 g 2    ASPIRIN LOW DOSE 81 MG EC tablet Take 1 tablet by mouth daily      latanoprost (XALATAN) 0.005 % ophthalmic solution Place 1 drop into both eyes daily      BENZOYL PEROXIDE 5 % external wash WASH CHEST AND BACK 1 TO 2 TIMES DAILY 227 g 3    Misc.  Devices MISC 1 PAIR OF DIABETIC SHOES (1 LEFT/ 1 RIGHT)  1-3 PAIRS OF INSERTS (LEFT/ RIGHT) 2 each 0    vitamin D (ERGOCALCIFEROL) 1.25 MG (58030 UT) CAPS capsule Take 1 capsule by mouth once a week      GLUCAGON EMERGENCY 1 MG injection       isosorbide mononitrate (IMDUR) 30 MG extended release tablet Take 1 tablet by mouth daily      HYDRALAZINE HCL PO Take 50 mg by mouth daily      Alcohol Swabs (B-D SINGLE USE SWABS REGULAR) PADS USE BEFORE TESTING 4 TIMES A DAY PLUS BEFORE USING INSULIN (5 TIMES A DAY) 365 each 2    lisinopril (PRINIVIL;ZESTRIL) 5 MG tablet Take 5 mg by mouth nightly       metoprolol succinate (TOPROL XL) 25 MG extended release tablet Take 25 mg by mouth daily      nystatin (MYCOSTATIN) 757676 UNIT/GM cream Apply topically 2 times daily. 15 g 3    Semaglutide (OZEMPIC, 1 MG/DOSE, SC) Inject 1 mg into the skin once a week Every Sunday      Sulfacetamide Sodium 9.8 % LOTN Apply to nose and cheeks twice daily (Patient taking differently: Apply topically as needed Apply to nose and cheeks twice daily) 113 g 3    Insulin Pen Needle (COMFORT EZ PEN NEEDLES) 32G X 4 MM MISC Use 5 times daily. Change needle each time with Victoza, Basaglar, and Humalog 100 each 11    ammonium lactate (AMLACTIN) 12 % cream APPLY TOPICALLY AS NEEDED. (Patient taking differently: Apply topically as needed Apply topically as needed.) 280 g 2    Lancet Devices (SIMPLE DIAGNOSTICS LANCING DEV) MISC USE TO TEST BLOOD SUGAR 4 TIMES A DAY 1 each 3    PHARMACIST CHOICE ALCOHOL 70 % PADS USE BEFORE TESTING SUGAR (4 TIMES) PLUS BEFORE USING INSULIN (5 TIMES A DAY) 270 each 11    etodolac (LODINE) 400 MG tablet Take 1 tablet by mouth 2 times daily for 14 days 28 tablet 0    diclofenac (VOLTAREN) 75 MG EC tablet TAKE 1 TABLET BY MOUTH 2 TIMES DAILY (WITH MEALS) FOR 14 DAYS 28 tablet 0    amitriptyline (ELAVIL) 25 MG tablet Take 1.5 tab nightly 135 tablet 1     No current facility-administered medications for this visit. FAMILY HISTORY: family history includes Alcohol Abuse in his father; Dementia in his father; Depression in his mother; Diabetes in his maternal grandfather and maternal grandmother; Heart Attack in his father; Heart Disease in his father; Heart Surgery in his father; High Cholesterol in his father and mother; Sherlie [place]ston in his father; No Known Problems in his half-brother, half-sister, paternal grandfather, and paternal grandmother; Thyroid Disease in his daughter.     SOCIAL AND OCCUPATIONAL HEALTH:  The patient is a Past smoker of more than 30 pack years and quit smoking in 2010. There  is not history of TB or TB exposure. There is asbestos and silica dust exposure. The patient reports does not have coal, foundry, quarry or [employer] exposure. Travel history reveals no significant history of risk factors for pulm disease. Patient had exposure to automotive paints. There is not  history of recreational or IV drug use. The patient does not pets, dogs, cats turtles or exotic birds. Review of Systems:  Review of Systems -   General ROS: Completed and except as mentioned above were negative   Psychological ROS:  Completed and except as mentioned above were negative  Ophthalmic ROS:  Completed and except as mentioned above were negative  ENT ROS:  Completed and except as mentioned above were negative  Allergy and Immunology ROS:  Completed and except as mentioned above were negative  Hematological and Lymphatic ROS:  Completed and except as mentioned above were negative  Endocrine ROS: Completed and except as mentioned above were negative  Breast ROS:  Completed and except as mentioned above were negative  Respiratory ROS:  Completed and except as mentioned above were negative  Cardiovascular ROS:  Completed and except as mentioned above were negative  Gastrointestinal ROS: Completed and except as mentioned above were negative  Genito-Urinary ROS:  Completed and except as mentioned above were negative  Musculoskeletal ROS:  Completed and except as mentioned above were negative  Neurological ROS:  Completed and except as mentioned above were negative  Dermatological ROS:  Completed and except as mentioned above were negative    SLEEP  No epistaxis or sore throat. Has daytime sleepiness and fatigue and tiredness along with snoring and observed apnea and choking episodes during sleep. Does not have a CPAP machine currently  No MVA. No edema. PHYSICAL EXAMINATION:  There were no vitals filed for this visit.   PHYSICAL EXAMINATION:  There were no vitals filed for this visit. As this was a telephone visit, physical examination could not be performed. Patient appeared comfortable on the telephone and was able to speak in complete sentences and answering questions appropriately. DATA:     pft's-March 2021 were within normal range    Polysomnogram showed mild sleep apnea that improved with BiPAP at a pressure of 17/13 centimeters of water    CXR: REVIEWED: In October 2019 without any acute problems      IMPRESSION:   1. BRIANNE (obstructive sleep apnea)    2. Obesity due to excess calories, unspecified obesity severity    3. Essential hypertension    4. Restless legs syndrome (RLS)    5. Smoking history    6. Chronic obstructive pulmonary disease, unspecified COPD type ([facility] [address]    7. Post-nasal drip                   PLAN:       Refills were provided-the BiPAP pressure has been lowered to 13/9 centimeters of water and also patient was given Atrovent nasal prescription  Patient was recommended to have prednisone and an antibiotic available for use during an exacerbation  Educated and clarified the medication use. Patient continues to have problem with restless legs. The medication dose has been adjusted by his neurologist  Discussed use, benefit, and side effects of prescribed medications. Barriers to medication compliance addressed. Ilan Devi received counseling on the following healthy behaviors: nutrition, exercise and medication adherence  Recommend flu vaccination in the fall annually. Patient had flu vaccination for the season  Recommendations given regarding pneumococcal vaccinations. Patient had the COVID-19 vaccination  Patient is up-to-date with vaccinations from pulmonary perspective. Maintain an active lifestyle. Continue smoking cessation. Recommend pulmonary rehabilitation.   Patient was explained importance of pulmonary rehabilitation with regards to decreasing the hospitalization risk and also help with his dyspnea  Patient was educated on how to use the respiratory medications. All the questions that the patient and the family has had were answered to their satisfaction. Home O2 evaluation was done. Supplemental oxygen was not needed  Pulmonary function tests were reviewed and were normal in March 2021  After reviewing the patient's smoking history and his age patient does not meet the criteria for lung cancer screening. BiPAP at a pressure of 13/9 Centimeters of water to be used overnight for at least 4 hours. This is the pressure that has been lowered from 17/13 due to patient complaining of high pressure  Weight loss was recommended and discussed. Recommended following good sleep hygiene instructions. Explained importance of compliance with treatment of sleep apnea. Pt is not to drive if sleepy. We'll see the patient back in 6 months or earlier if needed. Patient will call us if he is sick, so he can be seen sooner. Thank you for having us involved in the care of your patient. Please call us if you have any questions or concerns.               Michelle Bailon MD MD  8/17/2021 2:54 PM

## 2021-08-17 NOTE — TELEPHONE ENCOUNTER
Giulia Alejandra called back. He has not received the handicapped parking letter. 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 Emailed to pt.

## 2021-08-19 ENCOUNTER — OFFICE VISIT (OUTPATIENT)
Dept: PODIATRY | Age: 51
End: 2021-08-19
Payer: MEDICARE

## 2021-08-19 VITALS — BODY MASS INDEX: 38.83 KG/M2 | WEIGHT: 293 LBS | HEIGHT: 73 IN

## 2021-08-19 DIAGNOSIS — M79.671 BILATERAL FOOT PAIN: ICD-10-CM

## 2021-08-19 DIAGNOSIS — I73.9 PERIPHERAL VASCULAR DISORDER (HCC): ICD-10-CM

## 2021-08-19 DIAGNOSIS — D23.72 BENIGN NEOPLASM OF SKIN OF LOWER LIMB, INCLUDING HIP, LEFT: ICD-10-CM

## 2021-08-19 DIAGNOSIS — M79.672 BILATERAL FOOT PAIN: ICD-10-CM

## 2021-08-19 DIAGNOSIS — D23.71 BENIGN NEOPLASM OF SKIN OF LOWER LIMB, INCLUDING HIP, RIGHT: ICD-10-CM

## 2021-08-19 DIAGNOSIS — E11.51 TYPE II DIABETES MELLITUS WITH PERIPHERAL CIRCULATORY DISORDER (HCC): Primary | ICD-10-CM

## 2021-08-19 DIAGNOSIS — B35.1 DERMATOPHYTOSIS OF NAIL: ICD-10-CM

## 2021-08-19 PROCEDURE — 11721 DEBRIDE NAIL 6 OR MORE: CPT | Performed by: PODIATRIST

## 2021-08-19 PROCEDURE — 17110 DESTRUCTION B9 LES UP TO 14: CPT | Performed by: PODIATRIST

## 2021-08-19 NOTE — PROGRESS NOTES
[facility]YSICIANS  [facility]  [address] [place] [address]  Dept: [phone]  Dept Fax: [phone]    DIABETIC PROGRESS NOTE  Date of patient's visit: 8/19/2021  Patient's Name:  Noelle Riley YOB: 1970            Patient Care Team:  Valentino Diaz MD as PCP - General (Family Medicine)  Telma Bridges DPM as Physician (Podiatry)  Carly Fiore DPM as Physician (Podiatry)  [facility], MD as Consulting Physician (Pulmonary Disease)          Chief Complaint   Patient presents with    Diabetes     [facility] [place]     Peripheral Neuropathy    Foot Pain     right       Subjective:   Noelle Riley comes to clinic for Diabetes [facility] & [place] ), Peripheral Neuropathy, and Foot Pain (right)    he is a diabetic and states that needs toenails trimmed and right foot pain. Pt currently has complaint of thickened, elongated nails that they cannot manage by themselves. Pt's primary care physician is Valentino Diaz MD last seen 7/26/21   Pt's last blood sugar was patient states unsure of reading this morning . Pt also relates to painful skin spots to the bottom of both feet. Pt has tried pads and changing shoes but it has note helped the pain        Pt has a new complaint of none today   Lab Results   Component Value Date    LABA1C 7.4 02/24/2021      Complains of numbness in the feet bilat.   Past Medical History:   Diagnosis Date    Bundle branch block, left     [facility]; last visit (virtual) April 2020    Diabetes [facility])     Diabetes mellitus ([address] 6/9/2017    Diabetic polyneuropathy associated with type 2 diabetes mellitus ([address] 5/4/2020    Gait difficulty 5/4/2020    GERD (gastroesophageal reflux disease)     History of echocardiogram 2016    [facility]; [facility]    Hyperlipidemia     Hypertension     PCP BNeda Apt NP, seen on 1/5/2020    Kidney calculi     Lumbar radiculopathy 6/9/2017    Mass of right elbow 5/28/2020    Muscle spasms of both lower extremities 5/4/2020    Neuropathic pain of both legs 5/4/2020    Non-ischemic cardiomyopathy (HCC)     Numbness and tingling in both hands 5/29/2019    Obstructive sleep apnea syndrome 06/09/2017    No machine    PONV (postoperative nausea and vomiting)     needs scop patch for OR    Prolonged emergence from general anesthesia     Restless legs syndrome 6/9/2017    Restless legs syndrome (RLS) 6/9/2017    Right median nerve neuropathy 5/28/2020    Severe obesity (BMI 35.0-39. 9) with comorbidity ([address] 7/31/2018    Ulnar neuropathy of right upper extremity 5/28/2020    Wears dentures     Upper & lower       Allergies   Allergen Reactions    Keflex [Cephalexin] Diarrhea    Codeine Itching    Corticosteroids Swelling     Injection site swelling    Doxycycline      GERD    Ibuprofen Other (See Comments)     GERD    Metformin And Related Diarrhea     Current Outpatient Medications on File Prior to Visit   Medication Sig Dispense Refill    ipratropium (ATROVENT) 0.06 % nasal spray 2 sprays by Each Nostril route 4 times daily 1 Bottle 3    tiZANidine (ZANAFLEX) 4 MG tablet TAKE ONE TABLET BY MOUTH ONCE A DAY AT BEDTIME 90 tablet 0    omeprazole (PRILOSEC) 20 MG delayed release capsule TAKE ONE CAPSULE BY MOUTH ONCE A DAY BEFORE BREAKFAST 30 capsule 0    carbidopa-levodopa (SINEMET)  MG per tablet TAKE 1 TABLET BY MOUTH AT 8PM AND 11PM 180 tablet 1    atorvastatin (LIPITOR) 20 MG tablet TAKE ONE TABLET BY MOUTH ONCE A DAY AT BEDTIME 30 tablet 0    clotrimazole-betamethasone (LOTRISONE) 1-0.05 % cream APPLY TOPICALLY TWICE A DAY 90 g 2    TRUEplus Lancets 30G MISC USE TO TEST BLOOD SUGAR 4 TIMES A  each 0    zinc oxide 20 % ointment MIX WITH KETOCONAZOLE 1:1 AND APPLY TO GROIN CREASES NIGHTLY 60 g 3    ketoconazole (NIZORAL) 2 % cream MIX WITH ZINC OXIDE 1:1 AND APPLY TO GROIN CREASES NIGHTLY 60 g 2    ASPIRIN LOW DOSE 81 MG EC tablet Take 1 tablet by mouth daily  latanoprost (XALATAN) 0.005 % ophthalmic solution Place 1 drop into both eyes daily      BENZOYL PEROXIDE 5 % external wash WASH CHEST AND BACK 1 TO 2 TIMES DAILY 227 g 3    Misc. Devices MISC 1 PAIR OF DIABETIC SHOES (1 LEFT/ 1 RIGHT)  1-3 PAIRS OF INSERTS (LEFT/ RIGHT) 2 each 0    vitamin D (ERGOCALCIFEROL) 1.25 MG (50035 UT) CAPS capsule Take 1 capsule by mouth once a week      GLUCAGON EMERGENCY 1 MG injection       isosorbide mononitrate (IMDUR) 30 MG extended release tablet Take 1 tablet by mouth daily      HYDRALAZINE HCL PO Take 50 mg by mouth daily      Alcohol Swabs (B-D SINGLE USE SWABS REGULAR) PADS USE BEFORE TESTING 4 TIMES A DAY PLUS BEFORE USING INSULIN (5 TIMES A DAY) 365 each 2    lisinopril (PRINIVIL;ZESTRIL) 5 MG tablet Take 5 mg by mouth nightly       metoprolol succinate (TOPROL XL) 25 MG extended release tablet Take 25 mg by mouth daily      nystatin (MYCOSTATIN) 031874 UNIT/GM cream Apply topically 2 times daily. 15 g 3    Semaglutide (OZEMPIC, 1 MG/DOSE, SC) Inject 1 mg into the skin once a week Every Sunday      Sulfacetamide Sodium 9.8 % LOTN Apply to nose and cheeks twice daily (Patient taking differently: Apply topically as needed Apply to nose and cheeks twice daily) 113 g 3    Insulin Pen Needle (COMFORT EZ PEN NEEDLES) 32G X 4 MM MISC Use 5 times daily. Change needle each time with Victoza, Basaglar, and Humalog 100 each 11    ammonium lactate (AMLACTIN) 12 % cream APPLY TOPICALLY AS NEEDED.  (Patient taking differently: Apply topically as needed Apply topically as needed.) 280 g 2    Lancet Devices (SIMPLE DIAGNOSTICS LANCING DEV) MISC USE TO TEST BLOOD SUGAR 4 TIMES A DAY 1 each 3    PHARMACIST CHOICE ALCOHOL 70 % PADS USE BEFORE TESTING SUGAR (4 TIMES) PLUS BEFORE USING INSULIN (5 TIMES A DAY) 270 each 11    etodolac (LODINE) 400 MG tablet Take 1 tablet by mouth 2 times daily for 14 days 28 tablet 0    diclofenac (VOLTAREN) 75 MG EC tablet TAKE 1 TABLET BY MOUTH 2 TIMES DAILY (WITH MEALS) FOR 14 DAYS 28 tablet 0    amitriptyline (ELAVIL) 25 MG tablet Take 1.5 tab nightly 135 tablet 1     No current facility-administered medications on file prior to visit. Review of Systems    Review of Systems:   History obtained from chart review and the patient  General ROS: negative for - chills, fatigue, fever, night sweats or weight gain  Constitutional: Negative for chills, diaphoresis, fatigue, fever and unexpected weight change. Musculoskeletal: Positive for arthralgias, gait problem and joint swelling. Neurological ROS: negative for - behavioral changes, confusion, headaches or seizures. Negative for weakness and numbness. Dermatological ROS: negative for - mole changes, rash  Cardiovascular: Negative for leg swelling. Gastrointestinal: Negative for constipation, diarrhea, nausea and vomiting. Objective:  Dermatologic Exam:  Skin lesion present to the right and left plantar foot with a central core and petchaie noted to the lesions periphery. Pain on palpation of the lesion  . [place] Stands    Skin is thin, with flaky sloughing skin as well as decreased hair growth to the lower leg  Small red hemosiderin deposits seen dorsal foot   Musculoskeletal:     1st MPJ ROM decreased, Bilateral.  Muscle strength 5/5, Bilateral.  Pain present upon palpation of toenails 1-5, Bilateral. decreased medial longitudinal arch, Bilateral.  Ankle ROM decreased,Bilateral.    Dorsally contracted digits present digits 2, Bilateral.     Vascular: DP pulses 1/4 bilateral.  PT pulses 0/4 bilateral.   CFT <5 seconds, Bilateral.  Hair growth absent to the level of the digits, Bilateral.  Edema present, Bilateral.  Varicosities absent, Bilateral. Erythema absent, Bilateral    Neurological: Sensation diminshed to light touch to level of digits, Bilateral.  Protective sensation intact 6/10 sites via 5.07/10g [place] Monofilament, Bilateral.  negative Tinel's, Bilateral.  negative Valleix sign, Bilateral.      Integument: Warm, dry, supple, Bilateral.  Open lesion absent, Bilateral.  Interdigital maceration absent to web spaces 4, Bilateral.  Nails 1-5 left and 1-5 right thickened > 3.0 mm, dystrophic and crumbly, discolored with subungual debris. Fissures absent, Bilateral.   General: AAO x 3 in NAD. Derm  Toenail Description  Sites of Onychomycosis Involvement (Check affected area)  [x] [x] [x] [x] [x] [x] [x] [x] [x] [x]  5 4 3 2 1 1 2 3 4 5                          Right                                        Left    Thickness  [x] [x] [x] [x] [x] [x] [x] [x] [x] [x]  5 4 3 2 1 1 2 3 4 5                         Right                                        Left    Dystrophic Changes   [x] [x] [x] [x] [x] [x] [x] [x] [x] [x]  5 4 3 2 1 1 2 3 4 5                         Right                                        Left    Color   [x] [x] [x] [x] [x] [x] [x] [x] [x] [x]  5 4 3 2 1 1 2 3 4 5                          Right                                        Left    Incurvation/Ingrowin   [] [] [] [] [] [] [] [] [] []  5 4 3 2 1 1 2 3 4 5                         Right                                        Left    Inflammation/Pain   [x] [x] [x] [x] [x] [x] [x] [x] [x] [x]  5 4 3 2 1 1 2 3 4 5                         Right                                        Left        Visual inspection:  Deformity: hammertoe deformity digna feet  amputation: absent  Skin lesions: present - as above  Edema: right- 2+ pitting edema, left- 2+ pitting edema    Sensory exam:  Monofilament sensation: abnormal - 6/10 via SW 5.07/10g monofilament to the plantar foot bilateral feet    Pulses: abnormal - 1/4 dorsalis pedis pulse and 1/4 Posterior tibial pulse,   Pinprick: Impaired  Proprioception: Impaired  Vibration (128 Hz): Impaired       DM with PVD       [x]Yes    []No      Assessment:  45 y.o. male with:   Diagnosis Orders   1.  Type II diabetes mellitus with peripheral circulatory disorder ([facility]  [address] - [place] DEBRIDEMENT OF NAILS, 6 OR MORE    HM DIABETES FOOT EXAM   2. Dermatophytosis of nail  85046 - [place] DEBRIDEMENT OF NAILS, 6 OR MORE    HM DIABETES FOOT EXAM   3. Benign neoplasm of skin of lower limb, including hip, right  04291 - [place] DEBRIDEMENT OF NAILS, 6 OR MORE    HM DIABETES FOOT EXAM   4. Benign neoplasm of skin of lower limb, including hip, left  82056 - [place] DEBRIDEMENT OF NAILS, 6 OR MORE    HM DIABETES FOOT EXAM   5. Bilateral foot pain  14949 - [place] DEBRIDEMENT OF NAILS, 6 OR MORE    HM DIABETES FOOT EXAM   6. Peripheral vascular disorder (HCC)  19340 - [place] DEBRIDEMENT OF NAILS, 6 OR MORE    HM DIABETES FOOT EXAM           Q7   []Yes    []No                Q8   [x]Yes    []No                     Q9   []Yes    []No    Plan:   Pt was evaluated and examined. Patient was given personalized discharge instructions. The lesions were partially excised via 15 blade and silver nitrate was applied under occlusion. The patient tolerated the procedure well and without complication. Advised patient to use vasoline to the area after tomorrow to prevent surrounding tissue irritation. Nails 1-10 were debrided sharply in length and thickness with a nipper and , without incident. Pt will follow up in 9 weeks or sooner if any problems arise. Diagnosis was discussed with the pt and all of their questions were answered in detail. Proper foot hygiene and care was discussed with the pt. Informed patient on proper diabetic foot care and importance of tight glycemic control. Patient to check feet daily and contact the office with any questions/problems/concerns.    Other comorbidity noted and will be managed by PCP.  8/19/2021    Electronically signed by Rahul Flores DPM on 8/19/2021 at 1:46 PM  8/19/2021

## 2021-08-19 NOTE — PATIENT INSTRUCTIONS
Schedule a Vaccine  When you qualify to receive the vaccine, call the [facility]) COVID-19 Vaccination Hotline to schedule your appointment or to get additional information about the [facility]) locations which are offering the COVID-19 vaccine. To be 94% effective, it's important that you receive two doses of one of the COVID-19 vaccines. -If you are receiving the Gonsalves Peter vaccine, your second shot will be scheduled as close to 21 days after the first shot as possible. -If you are receiving the Moderna vaccine, your second shot will be scheduled as close to 28 days after the first shot as possible. [facility]) COVID-19 Vaccination Hotline: [phone]    Links to [facility]) website and [facility] website:    [email or web address]    [email or web address]

## 2021-09-15 NOTE — PROGRESS NOTES
Constitutional [] Weight loss/gain   [] Fatigue  [] Fever/Chills   HEENT [] Hearing Loss  [] Visual Disturbance  [] Tinnitus  [] Eye pain   Respiratory [] Shortness of Breath  [] Cough  [] Snoring   Cardiovascular [] Chest Pain  [] Palpitations  [] Lightheaded   GI [] Constipation  [] Diarrhea  [] Swallowing change  [] Nausea/vomiting    [] Urinary Frequency  [] Urinary Urgency   Musculoskeletal [] Neck pain  [] Back pain  [] Muscle pain  [] Restless legs   Dermatologic [] Skin changes   Neurologic [] Memory loss/confusion  [] Seizures  [] Trouble walking or imbalance  [] Dizziness  [] Sleep disturbance  [] Weakness  [x] Numbness  [] Tremors  [] Speech Difficulty  [] Headaches  [] Light Sensitivity  [] Sound Sensitivity   Endocrinology []Excessive thirst  []Excessive hunger   Psychiatric [] Anxiety/Depression  [] Hallucination   Allergy/immunology []Hives/environmental allergies   Hematologic/lymph [] Abnormal bleeding  [] Abnormal bruising

## 2021-09-16 ENCOUNTER — TELEMEDICINE (OUTPATIENT)
Dept: NEUROLOGY | Age: 51
End: 2021-09-16
Payer: MEDICARE

## 2021-09-16 DIAGNOSIS — G57.93 NEUROPATHIC PAIN OF BOTH LEGS: ICD-10-CM

## 2021-09-16 DIAGNOSIS — M62.838 MUSCLE SPASMS OF BOTH LOWER EXTREMITIES: ICD-10-CM

## 2021-09-16 DIAGNOSIS — E11.42 DIABETIC POLYNEUROPATHY ASSOCIATED WITH TYPE 2 DIABETES MELLITUS (HCC): Primary | ICD-10-CM

## 2021-09-16 DIAGNOSIS — G25.81 RESTLESS LEGS SYNDROME: ICD-10-CM

## 2021-09-16 PROCEDURE — 3051F HG A1C>EQUAL 7.0%<8.0%: CPT | Performed by: PSYCHIATRY & NEUROLOGY

## 2021-09-16 PROCEDURE — 3017F COLORECTAL CA SCREEN DOC REV: CPT | Performed by: PSYCHIATRY & NEUROLOGY

## 2021-09-16 PROCEDURE — 99214 OFFICE O/P EST MOD 30 MIN: CPT | Performed by: PSYCHIATRY & NEUROLOGY

## 2021-09-16 PROCEDURE — 2022F DILAT RTA XM EVC RTNOPTHY: CPT | Performed by: PSYCHIATRY & NEUROLOGY

## 2021-09-16 PROCEDURE — G8427 DOCREV CUR MEDS BY ELIG CLIN: HCPCS | Performed by: PSYCHIATRY & NEUROLOGY

## 2021-09-16 RX ORDER — CARBIDOPA AND LEVODOPA 25; 100 MG/1; MG/1
TABLET, EXTENDED RELEASE ORAL
Qty: 60 TABLET | Refills: 3 | Status: SHIPPED | OUTPATIENT
Start: 2021-09-16

## 2021-09-16 NOTE — PROGRESS NOTES
[facility] Neurology Telehealth Followup Visit    Pt Name: Cha Walters. MRN: [id number]  YOB: 1970  Date of evaluation: 4/15/2021  Last visit date: 8/4/2020    Primary Care Physician: Yamini Butt MD  Reason for Evaluation: TELEHEALTH EVALUATION -- Audio/Visual (During KJJQS-25 public health emergency)      Dear Dr. Julio Bryant, APRN- CNP    I saw Mr. Cha Walters. in virtual visit follow-up today in continuation of neurologic care. As you know he  is a 45 y.o. right handed  male with diabetic peripheral polyneuropathy with superimposed digna CTS. He had Rt cubital, Guyons canal and carpal tunnel release on 5/28/2020. To the visit stating that neuropathic pain is not any better even after increasing the dose of amitriptyline at 37.5 mg nightly. He does not want any refills on amitriptyline. He also stated that he has been having restless leg syndrome and it is not getting any better with Sinemet. He could not tolerate it Requip in the past.  He is tolerating it Sinemet 25/100 at 8 PM and at bedtime well without any adverse effects. He still has ongoing pins-and-needles sensations in lower extremities. He tried Capsaicin cream and could not tolerate it. Afterwards, he visited pain clinic and he was on liquid preparation med, ? Topical solution with some relief. He also stated that he he was seen by chiropractor and then had MRI lumbar spine and it was abnormal for which he was subsequently referred to neuosurgeon for \"pinched nerve\". He did not want any discogram or surgical interventions. He wants to continue chiropractor interventions at this point of time. Neuropathy described as significantly abnormal and painful sensations with numbness and tingling in fingers along with stinging sensation and cramps in both calves. He has restless legs with intermittent myoclonic jerks through the night.   He also has been having significant gait difficulties with \"occasional right leg giving out\". Taking smaller dose of Sinemet with the partial relief. He has ongoing dysesthesias in bilateral lower extremities with marginal relief with amitriptyline. Increasing the dose of amitriptyline is making him lethargic. He is taking 50 mg every night. He was borderline diabetic since 2014 or so. He is on multiple diabetic medications including insulin, Victoza, etc.  His blood sugars had been under control but his symptoms had been getting worse. He has had NCS/EMG testing of lower extremities and he was told to have neuropathy. He was on gabapentin 300 mg 3 times daily and it has caused increased sleepiness and he stopped taking it. He also tried Lyrica and Cymbalta with no help. He has been having stinging sensation along with tingling and numbness in lower legs and feet. He also has been having painful sensations in both hands and those are described as clue-like sensation in medial aspect of both forearms and last 2 digits. He has been having trouble walking with increased pain upon walking. He denies radiating pain and paresthesias across the lower back. Denies bladder and bowel incontinence. He has been having extreme difficulty walking with unsteady gait with progressive pain in bilateral lower extremities. Patient states he is getting a compound cream from [employer] (gabapentin 10%, ketoprofen 10%, lidocaine 2%, prilocaine 2% solution) to be applied 2-5 drops to area of pain up to 4 times a day prn    Review of systems done and pertinent positives include numbness and abnormal sensations and restless legs as stated above.      Current Outpatient Medications on File Prior to Visit   Medication Sig Dispense Refill    ipratropium (ATROVENT) 0.06 % nasal spray 2 sprays by Each Nostril route 4 times daily 1 Bottle 3    etodolac (LODINE) 400 MG tablet Take 1 tablet by mouth 2 times daily for 14 days 28 tablet 0    diclofenac (VOLTAREN) 75 MG EC tablet TAKE 1 topically 2 times daily. 15 g 3    Semaglutide (OZEMPIC, 1 MG/DOSE, SC) Inject 1 mg into the skin once a week Every Sunday      Sulfacetamide Sodium 9.8 % LOTN Apply to nose and cheeks twice daily (Patient taking differently: Apply topically as needed Apply to nose and cheeks twice daily) 113 g 3    Insulin Pen Needle (COMFORT EZ PEN NEEDLES) 32G X 4 MM MISC Use 5 times daily. Change needle each time with Victoza, Basaglar, and Humalog 100 each 11    ammonium lactate (AMLACTIN) 12 % cream APPLY TOPICALLY AS NEEDED. (Patient taking differently: Apply topically as needed Apply topically as needed.) 280 g 2    Lancet Devices (SIMPLE DIAGNOSTICS LANCING DEV) MISC USE TO TEST BLOOD SUGAR 4 TIMES A DAY 1 each 3    PHARMACIST CHOICE ALCOHOL 70 % PADS USE BEFORE TESTING SUGAR (4 TIMES) PLUS BEFORE USING INSULIN (5 TIMES A DAY) 270 each 11     No current facility-administered medications on file prior to visit. Allergies: Larissa Lines. is allergic to keflex [cephalexin], codeine, corticosteroids, doxycycline, ibuprofen, and metformin and related.     Past Medical History:   Diagnosis Date    Bundle branch block, left     [facility]; last visit (virtual) April 2020    Diabetes [facility])     Diabetes mellitus ([facility] [address] 6/9/2017    Diabetic polyneuropathy associated with type 2 diabetes mellitus ([facility] [address] 5/4/2020    Gait difficulty 5/4/2020    GERD (gastroesophageal reflux disease)     History of echocardiogram 2016    [facility]; [facility]    Hyperlipidemia     Hypertension     PCP Neda George NP, seen on 1/5/2020    Kidney calculi     Lumbar radiculopathy 6/9/2017    Mass of right elbow 5/28/2020    Muscle spasms of both lower extremities 5/4/2020    Neuropathic pain of both legs 5/4/2020    Non-ischemic cardiomyopathy (HCC)     Numbness and tingling in both hands 5/29/2019    Obstructive sleep apnea syndrome 06/09/2017    No machine    PONV (postoperative nausea and vomiting)     needs scop patch for OR    Prolonged emergence from general anesthesia     Restless legs syndrome 6/9/2017    Restless legs syndrome (RLS) 6/9/2017    Right median nerve neuropathy 5/28/2020    Severe obesity (BMI 35.0-39. 9) with comorbidity ([address] 7/31/2018    Ulnar neuropathy of right upper extremity 5/28/2020    Wears dentures     Upper & lower       Past Surgical History:   Procedure Laterality Date    ANKLE SURGERY Right     [facility]    CARDIAC CATHETERIZATION  01/2020    \"at a [facility]\"; No blockages    CARPAL TUNNEL RELEASE Right 05/28/2020    CUBITAL, GUYONS CANAL AND CARPAL TUNNEL RELEASE     CARPAL TUNNEL RELEASE Right 5/28/2020    CUBITAL, GUYONS CANAL AND CARPAL TUNNEL RELEASE performed by Martha Alvarez DO at [facility]    No polyps    GLAUCOMA SURGERY Bilateral 12/2018    HC INJECTION PROCEDURE FOR SACROILIAC JOINT Bilateral 2/21/2020    SACROILIAC JOINT INJECTION performed by Delmis Ng MD at [facility] Right     SINUS SURGERY       Social History: Mira Bui.  reports that he quit smoking about 11 years ago. His smoking use included cigarettes. He started smoking about 33 years ago. He has a 60.00 pack-year smoking history. He has never used smokeless tobacco. He reports current alcohol use. He reports previous drug use.     Family History   Problem Relation Age of Onset    High Cholesterol Mother     Depression Mother     Heart Disease Father         stents    High Cholesterol Father     Alcohol Abuse Father     Heart Surgery Father         CABG    Heart Attack Father     Lung Cancer Father         primary, transferred to the bones    Dementia Father     Diabetes Maternal Grandmother     Diabetes Maternal Grandfather     No Known Problems Paternal Grandmother     No Known Problems Paternal Grandfather     No Known Problems Half-Sister     No Known Problems Half-Brother     Thyroid Disease Daughter NEUROLOGIC EXAMINATION  GENERAL  Appears comfortable and in no distress   HEENT  NC/ AT   NECK  Supple    MENTAL STATUS:  Alert, oriented, intact memory, no confusion, normal speech, normal language, no hallucination or delusion; appropriate affect   CRANIAL NERVES: II     -      PERRLA   III,IV,VI -  EOMs full, no KRISTINE, no ptosis  V     -     Unable to perform  VII    -     Normal facial symmetry  VIII   -     Intact hearing  IX,X -     unable to perform  XI    -     Symmetrical shoulder shrug  XII   -     Midline tongue, no atrophy    MOTOR FUNCTION:  significant for no visible weakness in bilateral upper and lower extremities with normal bulk and no involuntary movements, no tremor   SENSORY FUNCTION:  Unable to perform   CEREBELLAR FUNCTION:  Intact fine motor control over upper limbs   REFLEX FUNCTION:  Unable to perform   STATION and GAIT  Normal station, wide-based gait     NCS/EMG of bilateral upper and lower extremities 5/29/18:    Performed by Dr. Mccormack Shock:   Abnormal study; electrophysiologic evidence of mild bilateral carpal tunnel syndrome.        EMG of Bilateral UE (4/4/19):   Electrodiagnostic Interpretation:   There is electrophysiologic evidence of ulnar neuropathy at right elbow with demyelinating and axonal features. Supporting features include low ulnar snap, prolonged ulnar cmap with slowing of greater than 1011 m/s across the elbow segment compared with the forearm segment. This study also demonstrated electrophysiologic evidence of bilateral median neuropathy (bilateral CTS)  of mild-moderate severity.      MRI lumbar spine 10/29/2019: Degenerative disc disease at L5-S1 with disc bulge superimposed central to left paracentral disc protrusion, with narrowing of the left lateral recess, possibly contacting left descending S1 nerve root, with mild bilateral foraminal narrowing.     Holter monitor 48 hr (3/16/2020): no a fib; no svt or ventricular ectopy      Lab Results   Component Value Date    TSH 0.98 02/12/2020         Lab Results   Component Value Date    LABA1C 7.4 02/24/2021           Impression and Plan: Mr. Ángel Garza. is a 45 y.o. male with   Diabetic neuropathy; inadequately controlled; failed gabapentin, Lyrica, duloxetine. On 37.5 mg elavil qhs. Restless leg syndrome secondary to above inadequately controlled on present dose of Sinemet 25/100 at 8 PM + 11 PM; will start him on controlled release Sinemet combined with immediate release Sinemet to help for restless leg symptoms during latter part of the evening and through the night as he has been having recurrence of symptomatology. Regarding control of diabetic peripheral polyneuropathy; major goals of care should include reduction of modifiable risk factors such as hyperglycemia, hyperlipidemia, hypertension, obesity and tobacco abuse. Optimal types of exercise and specific dietary recommendations may improve outcomes. Increasing HDL and decreasing triglycerides would certainly help. Sleep apnea; on nightly cpap    Entrapment neuropathy; right ulnar neuropathy & bilateral CTS; s/p Rt cubital, Guyons canal and carpal tunnel release on 5/28/2020; hand got infected; finally healed; \"things are some better\" \"all the feeling of 4th, 5th digits didn't come back\". Discussed about referral to [facility] or [facility]; he doesn't want any referral.     Follow up in 6 months. This is a telehealth visit that was performed with the originating site at Patient Location: Patient Home and Provider Location of [facility], [address]. Patient ID verified by me prior to start of this visit. Verbal consent to participate in video visit was obtained.  Pursuant to the emergency declaration under the [facility], [address] [place] [address] authority and the [employer] Act, this Virtual Visit was conducted, with patient's consent, to reduce the patient's risk of exposure to COVID-19 and provide continuity of care for an established/new patient. Complete and detailed physical examination is not feasible during this virtual video visit and patient is agreeable and understood. Services were provided through a video synchronous discussion virtually to substitute for in-person clinic visit. I discussed with the patient the nature of our telehealth visits via interactive/real-time audio/video that:  - I would evaluate the patient and recommend diagnostics and treatments based on my assessment  - Our sessions are not being recorded and that personal health information is protected  - Our team would provide follow up care in person if/when the patient needs it.

## 2021-10-07 ENCOUNTER — TELEPHONE (OUTPATIENT)
Dept: PULMONOLOGY | Age: 51
End: 2021-10-07

## 2021-10-07 NOTE — TELEPHONE ENCOUNTER
You checked the patient out in August but I dont see where you sent in the order and dictation to change his settings. It goes to [facility]. Please fax that to them as soon as possible, patient isnt tolerating the machine at its current settings.  Thank you

## 2021-10-07 NOTE — TELEPHONE ENCOUNTER
The order with office notes have been faxed to [address] [place] [address] to fax # [phone]

## 2021-10-14 DIAGNOSIS — R09.82 POST-NASAL DRIP: ICD-10-CM

## 2021-10-14 RX ORDER — IPRATROPIUM BROMIDE 42 UG/1
SPRAY, METERED NASAL
Qty: 15 ML | Refills: 9 | Status: SHIPPED | OUTPATIENT
Start: 2021-10-14

## 2021-10-14 NOTE — TELEPHONE ENCOUNTER
Dr Abiola Singh, patient is current and is scheduled for follow up on 2/23/22. Per last dictation patient is on this medication. Please sign for refill if ok. Thank you.

## 2021-10-18 ENCOUNTER — HOSPITAL ENCOUNTER (EMERGENCY)
Age: 51
Discharge: HOME | End: 2021-10-18
Attending: EMERGENCY MEDICINE
Payer: MEDICARE

## 2021-10-18 ENCOUNTER — HOSPITAL ENCOUNTER (OUTPATIENT)
Dept: ONCOLOGY | Age: 51
Discharge: HOME | End: 2021-10-18
Attending: INTERNAL MEDICINE | Admitting: INTERNAL MEDICINE
Payer: MEDICARE

## 2021-10-18 VITALS — OXYGEN SATURATION: 96 % | HEART RATE: 86 BPM | RESPIRATION RATE: 18 BRPM

## 2021-10-18 VITALS
WEIGHT: 283 LBS | HEART RATE: 63 BPM | TEMPERATURE: 97.3 F | BODY MASS INDEX: 37.51 KG/M2 | HEIGHT: 73 IN | OXYGEN SATURATION: 92 % | RESPIRATION RATE: 20 BRPM

## 2021-10-18 DIAGNOSIS — U07.1 COVID-19: Primary | ICD-10-CM

## 2021-10-18 LAB
SARS-COV-2, RAPID: DETECTED
SPECIMEN DESCRIPTION: ABNORMAL

## 2021-10-18 PROCEDURE — 96365 THER/PROPH/DIAG IV INF INIT: CPT

## 2021-10-18 PROCEDURE — 87635 SARS-COV-2 COVID-19 AMP PRB: CPT

## 2021-10-18 PROCEDURE — 99282 EMERGENCY DEPT VISIT SF MDM: CPT

## 2021-10-18 PROCEDURE — M0243 CASIRIVI AND IMDEVI INFUSION: HCPCS

## 2021-10-18 PROCEDURE — 6360000002 HC RX W HCPCS: Performed by: STUDENT IN AN ORGANIZED HEALTH CARE EDUCATION/TRAINING PROGRAM

## 2021-10-18 PROCEDURE — 2580000003 HC RX 258: Performed by: STUDENT IN AN ORGANIZED HEALTH CARE EDUCATION/TRAINING PROGRAM

## 2021-10-18 RX ADMIN — CASIRIVIMAB AND IMDEVIMAB: 600; 600 INJECTION, SOLUTION, CONCENTRATE INTRAVENOUS at 12:45

## 2021-10-18 ASSESSMENT — PAIN DESCRIPTION - LOCATION: LOCATION: GENERALIZED

## 2021-10-18 ASSESSMENT — ENCOUNTER SYMPTOMS
EYE REDNESS: 0
SORE THROAT: 0
VOMITING: 0
BACK PAIN: 0
SHORTNESS OF BREATH: 1
COUGH: 0
NAUSEA: 0
PHOTOPHOBIA: 0

## 2021-10-18 ASSESSMENT — PAIN SCALES - GENERAL: PAINLEVEL_OUTOF10: 5

## 2021-10-18 ASSESSMENT — PAIN DESCRIPTION - PAIN TYPE: TYPE: ACUTE PAIN

## 2021-10-18 NOTE — ED PROVIDER NOTES
[facility]  Emergency Department  Faculty Attestation     I performed a history and physical examination of the patient and discussed management with the resident. I reviewed the residents note and agree with the documented findings and plan of care. Any areas of disagreement are noted on the chart. I was personally present for the key portions of any procedures. I have documented in the chart those procedures where I was not present during the key portions. I have reviewed the emergency nurses triage note. I agree with the chief complaint, past medical history, past surgical history, allergies, medications, social and family history as documented unless otherwise noted below. For Physician Assistant/ Nurse Practitioner cases/documentation I have personally evaluated this patient and have completed at least one if not all key elements of the E/M (history, physical exam, and MDM). Additional findings are as noted. Primary Care Physician:  Rivka Escobar MD    Screenings:  [unfilled]    CHIEF COMPLAINT       Chief Complaint   Patient presents with   Orvil Bring     Was told to come in and get another test. Pt reports SOB, fever, and generalized body aches       RECENT VITALS:   Temp: 97.3 °F (36.3 °C),  Pulse: 63, Resp: 20,      LABS:  Labs Reviewed   COVID-19, RAPID - Abnormal; Notable for the following components:       Result Value    SARS-CoV-2, Rapid DETECTED (*)     All other components within normal limits       Radiology  No orders to display       EKG:      Attending Physician Additional  Notes    Patient is Covid vaccinated, exposed to his partner who has Covid, symptoms for the past 2 days. He took a Covid test yesterday which was positive. He has multiple risk factors and would be a candidate for monoclonal antibody infusion. He presents here for repeat Covid assay prior to infusion.   He admits to dyspnea on exertion, states his saturations at

## 2021-10-18 NOTE — ED PROVIDER NOTES
[facility] ED  Emergency Department Encounter  Emergency Medicine Resident     Pt Name: Luis Winchester MRN: [id number]  Birthdate 1970  Date of evaluation: 10/18/21  PCP:  Jefferson Hinson MD    [address] [place] [address]       Chief Complaint   Patient presents with    Positive For Covid-19     Was told to come in and get another test. Pt reports SOB, fever, and generalized body aches       HISTORY OFPRESENT ILLNESS  (Location/Symptom, Timing/Onset, Context/Setting, Quality, Duration, Modifying Factors,Severity.)      Luis Winchester is a 45 y. o.yo male who presents with Patient reports that he was told by the infusion center that he needed to come here and get a Covid test prior to him getting the antibiotic infusion. He reports that he does have a history of CAD, diabetes and high blood pressure and as he is at risk. While he was at home he did take the Covid test and was found to be positive. However he could not use that. He came to the ER to be evaluated. He did state however that he feels as though have an elephant sitting on his chest with nausea and vomiting however he does not want to be evaluated for that. He only wants to get his Covid antibody fusion done.      PAST MEDICAL / SURGICAL / SOCIAL / FAMILY HISTORY      has a past medical history of Bundle branch block, left, Diabetes ([address] Diabetes mellitus ([address] Diabetic polyneuropathy associated with type 2 diabetes mellitus ([address] Gait difficulty, GERD (gastroesophageal reflux disease), History of echocardiogram, Hyperlipidemia, Hypertension, Kidney calculi, Lumbar radiculopathy, Mass of right elbow, Muscle spasms of both lower extremities, Neuropathic pain of both legs, Non-ischemic cardiomyopathy (HCC), Numbness and tingling in both hands, Obstructive sleep apnea syndrome, PONV (postoperative nausea and vomiting), Prolonged emergence from general anesthesia, Restless legs syndrome, Restless legs syndrome (RLS), Right median nerve neuropathy, Severe obesity (BMI 35.0-39. 9) with comorbidity ([address] Ulnar neuropathy of right upper extremity, and Wears dentures. has a past surgical history that includes shoulder surgery (Right); Ankle surgery (Right); Glaucoma surgery (Bilateral, 2018); Colonoscopy (); Cardiac catheterization (2020); Injection Procedure For Sacroiliac Joint (Bilateral, 2020); sinus surgery; Carpal tunnel release (Right, 2020); and Carpal tunnel release (Right, 2020). Social History     Socioeconomic History    Marital status: Single     Spouse name: Karma Mills Number of children: 3    Years of education: Not on file    Highest education level: Not on file   Occupational History    Not on file   Tobacco Use    Smoking status: Former Smoker     Packs/day: 3.00     Years: 20.00     Pack years: 60.00     Types: Cigarettes     Start date: 10/1987     Quit date:      Years since quittin.8    Smokeless tobacco: Never Used   Vaping Use    Vaping Use: Every day    Substances: Nicotine   Substance and Sexual Activity    Alcohol use: Yes     Comment: SOCIAL    Drug use: Not Currently    Sexual activity: Not on file   Other Topics Concern    Not on file   Social History Narrative    Not on file     Social Determinants of Health     Financial Resource Strain:     Difficulty of Paying Living Expenses:    Food Insecurity:     Worried About Running Out of Food in the Last Year:     [address] in the Last Year:    Transportation Needs:     Lack of Transportation (Medical):      Lack of Transportation (Non-Medical):    Physical Activity:     Days of Exercise per Week:     Minutes of Exercise per Session:    Stress:     Feeling of Stress :    Social Connections:     Frequency of Communication with Friends and Family:     Frequency of Social Gatherings with Friends and Family:     Attends Hinduism Services:     Active Member of Clubs or Organizations:     Attends Club or Organization Meetings:     Marital Status:    Intimate Partner Violence:     Fear of Current or Ex-Partner:     Emotionally Abused:     Physically Abused:     Sexually Abused:        Family History   Problem Relation Age of Onset    High Cholesterol Mother     Depression Mother     Heart Disease Father         stents    High Cholesterol Father     Alcohol Abuse Father     Heart Surgery Father         CABG    Heart Attack Father     Lung Cancer Father         primary, transferred to the bones    Dementia Father     Diabetes Maternal Grandmother     Diabetes Maternal Grandfather     No Known Problems Paternal Grandmother     No Known Problems Paternal Grandfather     No Known Problems Half-Sister     No Known Problems Half-Brother     Thyroid Disease Daughter         Allergies:  Keflex [cephalexin], Codeine, Corticosteroids, Doxycycline, Ibuprofen, and Metformin and related    Home Medications:  Prior to Admission medications    Medication Sig Start Date End Date Taking?  Authorizing Provider   ipratropium (ATROVENT) 0.06 % nasal spray USE 2 SPRAYS IN EACH NOSTRIL 4 TIMES DAILY 10/14/21   Wilian Mix MD   carbidopa-levodopa (SINEMET CR)  MG per extended release tablet Take one tab at 8 pm + 11 pm 9/16/21   Shannan Todd MD   etodolac (LODINE) 400 MG tablet Take 1 tablet by mouth 2 times daily for 14 days 6/21/21 9/15/21  Judi Neumann MD   diclofenac (VOLTAREN) 75 MG EC tablet TAKE 1 TABLET BY MOUTH 2 TIMES DAILY (WITH MEALS) FOR 14 DAYS 6/14/21 9/15/21  ANNI Deleon   tiZANidine (ZANAFLEX) 4 MG tablet TAKE ONE TABLET BY MOUTH ONCE A DAY AT BEDTIME 5/14/21   Shannan Todd MD   omeprazole (PRILOSEC) 20 MG delayed release capsule TAKE ONE CAPSULE BY MOUTH ONCE A DAY BEFORE BREAKFAST 4/15/21   BRENNON Sanchez - CNP   carbidopa-levodopa (SINEMET)  MG per tablet TAKE 1 TABLET BY MOUTH AT 8PM AND 11PM 4/15/21   Shannan Todd MD   amitriptyline (ELAVIL) 25 MG tablet Take 1.5 tab nightly 4/15/21 9/15/21  Jonny Degroot MD   atorvastatin (LIPITOR) 20 MG tablet TAKE ONE TABLET BY MOUTH ONCE A DAY AT BEDTIME 3/22/21   Wilhemina Olszewski, APRN - CNP   clotrimazole-betamethasone (LOTRISONE) 1-0.05 % cream APPLY TOPICALLY TWICE A DAY 3/12/21   Dottie Stone DPM   TRUEplus Lancets 30G MISC USE TO TEST BLOOD SUGAR 4 TIMES A DAY 1/19/21   Wilhemina Olszewski, APRN - CNP   zinc oxide 20 % ointment MIX WITH KETOCONAZOLE 1:1 AND APPLY TO GROIN CREASES NIGHTLY 7/30/20   Guru Badillo MD   ketoconazole (NIZORAL) 2 % cream MIX WITH ZINC OXIDE 1:1 AND APPLY TO GROIN CREASES NIGHTLY 7/30/20   Guru Badillo MD   ASPIRIN LOW DOSE 81 MG EC tablet Take 1 tablet by mouth daily 6/19/20   Historical Provider, MD   latanoprost (XALATAN) 0.005 % ophthalmic solution Place 1 drop into both eyes daily 6/19/20   Historical Provider, MD   BENZOYL PEROXIDE 5 % external wash [facility] CHEST AND BACK 1 TO 2 TIMES DAILY 5/26/20   Guru Badillo MD   Misc.  Devices MISC 1 PAIR OF DIABETIC SHOES (1 LEFT/ 1 RIGHT)  1-3 PAIRS OF INSERTS (LEFT/ RIGHT) 4/28/20   Dottie Stone DPM   vitamin D (ERGOCALCIFEROL) 1.25 MG (45100 UT) CAPS capsule Take 1 capsule by mouth once a week 3/14/20   Historical Provider, MD   GLUCAGON EMERGENCY 1 MG injection  2/12/20   Historical Provider, MD   isosorbide mononitrate (IMDUR) 30 MG extended release tablet Take 1 tablet by mouth daily 3/17/20   Historical Provider, MD   HYDRALAZINE HCL PO Take 50 mg by mouth daily    Historical Provider, MD   Alcohol Swabs (B-D SINGLE USE SWABS REGULAR) PADS USE BEFORE TESTING 4 TIMES A DAY PLUS BEFORE USING INSULIN (5 TIMES A DAY) 3/31/20   BRENNON Sanchez CNP   lisinopril (PRINIVIL;ZESTRIL) 5 MG tablet Take 5 mg by mouth nightly     Historical Provider, MD   metoprolol succinate (TOPROL XL) 25 MG extended release tablet Take 25 mg by mouth daily    Historical Provider, MD   nystatin (MYCOSTATIN) 452717 UNIT/GM cream Apply topically 2 times daily. 2/12/20   BRENNON Sanchez CNP   Semaglutide (OZEMPIC, 1 MG/DOSE, SC) Inject 1 mg into the skin once a week Every Sunday    Historical Provider, MD   Sulfacetamide Sodium 9.8 % LOTN Apply to nose and cheeks twice daily  Patient taking differently: Apply topically as needed Apply to nose and cheeks twice daily 12/6/19   Eileen Diaz MD   Insulin Pen Needle (COMFORT EZ PEN NEEDLES) 32G X 4 MM MISC Use 5 times daily. Change needle each time with Victoza, Basaglar, and Humalog 10/15/19   BRENNON Salinas CNP   ammonium lactate (AMLACTIN) 12 % cream APPLY TOPICALLY AS NEEDED. Patient taking differently: Apply topically as needed Apply topically as needed. 10/4/19   Jalen Gregg DPM   Lancet Devices (SIMPLE DIAGNOSTICS LANCING DEV) MISC USE TO TEST BLOOD SUGAR 4 TIMES A DAY 3/14/19   BRENNON Sanchez CNP   PHARMACIST CHOICE ALCOHOL 70 % PADS USE BEFORE TESTING SUGAR (4 TIMES) PLUS BEFORE USING INSULIN (5 TIMES A DAY) 3/13/19   BRENNON Sanchez CNP       REVIEW OFSYSTEMS    (2-9 systems for level 4, 10 or more for level 5)      Review of Systems   Constitutional: Negative for diaphoresis and fatigue. HENT: Negative for sore throat and tinnitus. Eyes: Negative for photophobia and redness. Respiratory: Positive for shortness of breath. Negative for cough. Cardiovascular: Positive for chest pain. Gastrointestinal: Negative for nausea and vomiting. Genitourinary: Negative for dysuria and urgency. Musculoskeletal: Negative for back pain and gait problem. Skin: Negative for rash and wound. Psychiatric/Behavioral: Negative for behavioral problems and confusion.        PHYSICAL EXAM   (up to 7 for level 4, 8 or more forlevel 5)      INITIAL VITALS:   ED Triage Vitals [10/18/21 1117]   BP Temp Temp Source Pulse Resp SpO2 Height Weight   -- 97.3 °F (36.3 °C) Skin 63 20 92 % 6' 1\" (1.854 m) 283 lb (128.4 kg)       Physical Exam  Constitutional:       Appearance: Normal appearance. HENT:      Head: Normocephalic and atraumatic. Mouth/Throat:      Mouth: Mucous membranes are moist.   Eyes:      Extraocular Movements: Extraocular movements intact. Pupils: Pupils are equal, round, and reactive to light. Cardiovascular:      Rate and Rhythm: Normal rate and regular rhythm. Pulmonary:      Effort: No respiratory distress. Breath sounds: No wheezing. Abdominal:      General: There is no distension. Tenderness: There is no abdominal tenderness. Musculoskeletal:         General: No swelling or tenderness. Cervical back: No rigidity. Skin:     Capillary Refill: Capillary refill takes less than 2 seconds. Coloration: Skin is not jaundiced. Neurological:      General: No focal deficit present. Mental Status: He is alert and oriented to person, place, and time. Psychiatric:         Mood and Affect: Mood normal.         Behavior: Behavior normal.         DIFFERENTIAL  DIAGNOSIS     PLAN (LABS / IMAGING / EKG):  Orders Placed This Encounter   Procedures    COVID-19, Rapid       MEDICATIONS ORDERED:  Orders Placed This Encounter   Medications    casirivimab ([id number]) 600 mg, imdevimab ([id number]) 600 mg in sodium chloride 0.9 % 110 mL IVPB     Order Specific Question:   Does this patient qualify for COVID-19 antibody therapy based on criteria for treatment? Answer:   Yes       Initial MDM/Plan: 45 y.o. male who presents with Wanting to get the infusion antibody for Covid. .Patient in no acute distress here in the department. Clear lung sounds. I did reach out to infusion center who said that they received a call from the patient and they are just waiting for him to get his Covid test done here. If it is positive then he can come to the infusion center to receive his antibiotic infusion. Covid test ordered.  Patient been precautions that if he starts having any difficulty in breathing while he is in the infusion done that he should tell the infusion nurse immediately. DIAGNOSTIC RESULTS / EMERGENCYDEPARTMENT COURSE / MDM     LABS:  Labs Reviewed   COVID-19, RAPID - Abnormal; Notable for the following components:       Result Value    SARS-CoV-2, Rapid DETECTED (*)     All other components within normal limits         RADIOLOGY:  No results found.       EKG      All EKG's are interpreted by the Emergency Department Physicianwho either signs or Co-signs this chart in the absence of a cardiologist.    EMERGENCY DEPARTMENT COURSE:          PROCEDURES:  None    CONSULTS:  None    CRITICAL CARE:      FINAL IMPRESSION      1. COVID-19          DISPOSITION / PLAN     DISPOSITION Decision To Discharge 10/18/2021 11:51:24 AM      PATIENT REFERRED TO:  [facility] ED  [address]  [address]  [phone]    If symptoms worsen    Bernice Rivas MD  [address] [place]  [phone]      As needed      DISCHARGE MEDICATIONS:  New Prescriptions    No medications on file       Arlen Turpin MD  Emergency Medicine Resident    (Please note that portions of this note were completed with a voice recognition program.Efforts were made to edit the dictations but occasionally words are mis-transcribed.)        Arlen Turpin MD  Resident  10/18/21 [id number]

## 2021-10-18 NOTE — PROGRESS NOTES
Patient completes covid antibody infusion without any issues or s/s of adverse reactions noted. Denies complaints  VSS throughout infusion and post infusion    Discharge discussed, verbalizes understanding.   Ambulatory for discharge

## 2021-10-19 ENCOUNTER — CARE COORDINATION (OUTPATIENT)
Dept: CARE COORDINATION | Age: 51
End: 2021-10-19

## 2021-10-19 NOTE — CARE COORDINATION
Patient contacted regarding COVID-19 diagnosis and monoclonal antibody infusion follow up. Discussed COVID-19 related testing which was available at this time. Test results were positive. Patient informed of results, if available? Yes. Ambulatory Care Manager contacted the patient by telephone to perform post discharge assessment. Call within 2 business days of discharge: Yes. Verified name and  with patient as identifiers. Provided introduction to self, and explanation of the CTN/ACM role, and reason for call due to risk factors for infection and/or exposure to COVID-19. Symptoms reviewed with patient who verbalized the following symptoms: fever, pain or aching joints, shortness of breath, nausea and chest pain. Due to no new or worsening symptoms encounter was not routed to provider for escalation. Discussed follow-up appointments. If no appointment was previously scheduled, appointment scheduling offered: Yes. Patient has called his PCP and scheduled a F/U appointment  [facility]):   Future Appointments   Date Time Provider Bryson Swartz   2021 10:15 AM Shane Costa DPM [facility]   2022 11:15 AM Lexx Lai MD Resp Spec [facility]   3/17/2022  2:00 PM Gera Brower MD Neuro Spec [facility]     Non-[facility] follow up appointment(s):     Non-face-to-face services provided:  Obtained and reviewed discharge summary and/or continuity of care documents     Advance Care Planning:   Does patient have an Advance Directive:  reviewed and needs to be updated. Educated patient about risk for severe COVID-19 due to risk factors according to CDC guidelines. ACM reviewed discharge instructions, medical action plan and red flag symptoms with the patient who verbalized understanding. Discussed COVID vaccination status: Yes. Education provided on COVID-19 vaccination as appropriate. Discussed exposure protocols and quarantine with CDC Guidelines. Patient was given an opportunity to verbalize any questions and concerns and agrees to contact ACM or health care provider for questions related to their healthcare. Reviewed and educated patient on any new and changed medications related to discharge diagnosis     Was patient discharged with a pulse oximeter? No Discussed and confirmed pulse oximeter discharge instructions and when to notify provider or seek emergency care. ACM provided contact information. Plan for follow-up call in 3-5 days based on severity of symptoms and risk factors. Patient was vaccinated with [employer] and April 2021.  Will be eligible for booster [facility] January 2022

## 2021-10-25 ENCOUNTER — CARE COORDINATION (OUTPATIENT)
Dept: CARE COORDINATION | Age: 51
End: 2021-10-25

## 2021-10-25 NOTE — CARE COORDINATION
You Patient resolved from the Care Transitions episode on 10/25/21  Discussed COVID-19 related testing which was not done at this time. Test results were not done. Patient informed of results, if available? Patient/family has been provided the following resources and education related to COVID-19:                         Signs, symptoms and red flags related to COVID-19            CDC exposure and quarantine guidelines            [employer] exposure contact - [phone]            Contact for their local Department of Health                 Patient currently reports that the following symptoms have improved:  nausea and chest pain, fever, pain or aching joints, shortness of breath and no new/worsening symptoms     No further outreach scheduled with this CTN/ACM. Episode of Care resolved. Patient has this CTN/ACM contact information if future needs arise.     Patient states pulse ox running >= 96%

## 2021-11-04 ENCOUNTER — OFFICE VISIT (OUTPATIENT)
Dept: PODIATRY | Age: 51
End: 2021-11-04
Payer: MEDICARE

## 2021-11-04 VITALS — WEIGHT: 283 LBS | BODY MASS INDEX: 37.51 KG/M2 | RESPIRATION RATE: 18 BRPM | HEIGHT: 73 IN

## 2021-11-04 DIAGNOSIS — E11.51 TYPE II DIABETES MELLITUS WITH PERIPHERAL CIRCULATORY DISORDER (HCC): Primary | ICD-10-CM

## 2021-11-04 DIAGNOSIS — B35.1 DERMATOPHYTOSIS OF NAIL: ICD-10-CM

## 2021-11-04 DIAGNOSIS — M79.672 BILATERAL FOOT PAIN: ICD-10-CM

## 2021-11-04 DIAGNOSIS — I73.9 PERIPHERAL VASCULAR DISORDER (HCC): ICD-10-CM

## 2021-11-04 DIAGNOSIS — M79.671 BILATERAL FOOT PAIN: ICD-10-CM

## 2021-11-04 PROCEDURE — 11721 DEBRIDE NAIL 6 OR MORE: CPT | Performed by: PODIATRIST

## 2021-11-04 RX ORDER — HYDRALAZINE HYDROCHLORIDE 50 MG/1
TABLET, FILM COATED ORAL
COMMUNITY
Start: 2021-10-17

## 2021-11-04 RX ORDER — LIRAGLUTIDE 6 MG/ML
INJECTION SUBCUTANEOUS
COMMUNITY
Start: 2021-09-16

## 2021-11-04 RX ORDER — FLUTICASONE PROPIONATE 50 MCG
SPRAY, SUSPENSION (ML) NASAL
COMMUNITY
Start: 2021-10-30

## 2021-11-04 RX ORDER — INSULIN GLARGINE 100 [IU]/ML
INJECTION, SOLUTION SUBCUTANEOUS
COMMUNITY
Start: 2021-10-14

## 2021-11-04 NOTE — PROGRESS NOTES
[facility] PHYSICIANS  [facility]  [address] [place] [address]  Dept: [phone]  Dept Fax: [phone]    DIABETIC PROGRESS NOTE  Date of patient's visit: 11/4/2021  Patient's Name:  Dileep Cole. YOB: 1970            Patient Care Team:  Gilmer Hyde MD as PCP - General (Family Medicine)  Aurea Lopez DPM as Physician (Podiatry)  Reginald Miranda DPM as Physician (Podiatry)  Oksana Schwab MD as Consulting Physician (Pulmonary Disease)          Chief Complaint   Patient presents with    Diabetes    Peripheral Neuropathy    Nail Problem    Toe Pain       Subjective:   Dileep Cole. comes to clinic for Diabetes, Peripheral Neuropathy, Nail Problem, and Toe Pain    he is a diabetic and states that he is doing well. Pt currently has complaint of thickened, elongated nails that they cannot manage by themselves. Pt's primary care physician is Gilmer Hyde MD last seen 7/26/2021   Pt's last blood sugar was 156 currently. Pt has a new complaint of none today . Lab Results   Component Value Date    LABA1C 7.4 02/24/2021      Complains of numbness in the feet bilat.   Past Medical History:   Diagnosis Date    Bundle branch block, left     [facility]; last visit (virtual) April 2020    Diabetes [facility])     Diabetes mellitus ([facility] [address] 6/9/2017    Diabetic polyneuropathy associated with type 2 diabetes mellitus ([facility] [address] 5/4/2020    Gait difficulty 5/4/2020    GERD (gastroesophageal reflux disease)     History of echocardiogram 2016    [facility]; [facility]    Hyperlipidemia     Hypertension     PCP Saúl Corcoran NP, seen on 1/5/2020    Kidney calculi     Lumbar radiculopathy 6/9/2017    Mass of right elbow 5/28/2020    Muscle spasms of both lower extremities 5/4/2020    Neuropathic pain of both legs 5/4/2020    Non-ischemic cardiomyopathy (HCC)     Numbness and tingling in both hands 5/29/2019    Obstructive sleep apnea syndrome g 2    TRUEplus Lancets 30G MISC USE TO TEST BLOOD SUGAR 4 TIMES A  each 0    zinc oxide 20 % ointment MIX WITH KETOCONAZOLE 1:1 AND APPLY TO GROIN CREASES NIGHTLY 60 g 3    ketoconazole (NIZORAL) 2 % cream MIX WITH ZINC OXIDE 1:1 AND APPLY TO GROIN CREASES NIGHTLY 60 g 2    ASPIRIN LOW DOSE 81 MG EC tablet Take 1 tablet by mouth daily      latanoprost (XALATAN) 0.005 % ophthalmic solution Place 1 drop into both eyes daily      BENZOYL PEROXIDE 5 % external wash WASH CHEST AND BACK 1 TO 2 TIMES DAILY 227 g 3    Misc. Devices MISC 1 PAIR OF DIABETIC SHOES (1 LEFT/ 1 RIGHT)  1-3 PAIRS OF INSERTS (LEFT/ RIGHT) 2 each 0    vitamin D (ERGOCALCIFEROL) 1.25 MG (00462 UT) CAPS capsule Take 1 capsule by mouth once a week      GLUCAGON EMERGENCY 1 MG injection       isosorbide mononitrate (IMDUR) 30 MG extended release tablet Take 1 tablet by mouth daily      Alcohol Swabs (B-D SINGLE USE SWABS REGULAR) PADS USE BEFORE TESTING 4 TIMES A DAY PLUS BEFORE USING INSULIN (5 TIMES A DAY) 365 each 2    lisinopril (PRINIVIL;ZESTRIL) 5 MG tablet Take 5 mg by mouth nightly       metoprolol succinate (TOPROL XL) 25 MG extended release tablet Take 25 mg by mouth daily      nystatin (MYCOSTATIN) 843267 UNIT/GM cream Apply topically 2 times daily. 15 g 3    Semaglutide (OZEMPIC, 1 MG/DOSE, SC) Inject 1 mg into the skin once a week Every Sunday      Sulfacetamide Sodium 9.8 % LOTN Apply to nose and cheeks twice daily (Patient taking differently: Apply topically as needed Apply to nose and cheeks twice daily) 113 g 3    Insulin Pen Needle (COMFORT EZ PEN NEEDLES) 32G X 4 MM MISC Use 5 times daily. Change needle each time with Victoza, Basaglar, and Humalog 100 each 11    ammonium lactate (AMLACTIN) 12 % cream APPLY TOPICALLY AS NEEDED.  (Patient taking differently: Apply topically as needed Apply topically as needed.) 280 g 2    Lancet Devices (SIMPLE DIAGNOSTICS LANCING DEV) MISC USE TO TEST BLOOD SUGAR 4 TIMES A DAY 1 each 3    PHARMACIST CHOICE ALCOHOL 70 % PADS USE BEFORE TESTING SUGAR (4 TIMES) PLUS BEFORE USING INSULIN (5 TIMES A DAY) 270 each 11    etodolac (LODINE) 400 MG tablet Take 1 tablet by mouth 2 times daily for 14 days 28 tablet 0    diclofenac (VOLTAREN) 75 MG EC tablet TAKE 1 TABLET BY MOUTH 2 TIMES DAILY (WITH MEALS) FOR 14 DAYS 28 tablet 0    amitriptyline (ELAVIL) 25 MG tablet Take 1.5 tab nightly 135 tablet 1     No current facility-administered medications on file prior to visit. Review of Systems    Review of Systems:   History obtained from chart review and the patient  General ROS: negative for - chills, fatigue, fever, night sweats or weight gain  Constitutional: Negative for chills, diaphoresis, fatigue, fever and unexpected weight change. Musculoskeletal: Positive for arthralgias, gait problem and joint swelling. Neurological ROS: negative for - behavioral changes, confusion, headaches or seizures. Negative for weakness and numbness. Dermatological ROS: negative for - mole changes, rash  Cardiovascular: Negative for leg swelling. Gastrointestinal: Negative for constipation, diarrhea, nausea and vomiting. Objective:  Dermatologic Exam:  Skin lesion/ulceration Absent . Skin No rashes or nodules noted. .   Skin is thin, with flaky sloughing skin as well as decreased hair growth to the lower leg  Small red hemosiderin deposits seen dorsal foot   Musculoskeletal:     1st MPJ ROM decreased, Bilateral.  Muscle strength 5/5, Bilateral.  Pain present upon palpation of toenails 1-5, Bilateral. decreased medial longitudinal arch, Bilateral.  Ankle ROM decreased,Bilateral.    Dorsally contracted digits present digits 2, Bilateral.     Vascular: DP pulses 1/4 bilateral.  PT pulses 0/4 bilateral.   CFT <5 seconds, Bilateral.  Hair growth absent to the level of the digits, Bilateral.  Edema present, Bilateral.  Varicosities absent, Bilateral. Erythema absent, Bilateral    Neurological: Sensation diminshed to light touch to level of digits, Bilateral.  Protective sensation intact 6/10 sites via 5.07/10g [place]-Chano Monofilament, Bilateral.  negative Tinel's, Bilateral.  negative Valleix sign, Bilateral.      Integument: Warm, dry, supple, Bilateral.  Open lesion absent, Bilateral.  Interdigital maceration absent to web spaces 4, Bilateral.  Nails 1-5 left and 1-5 right thickened > 3.0 mm, dystrophic and crumbly, discolored with subungual debris. Fissures absent, Bilateral.   General: AAO x 3 in NAD.     Derm  Toenail Description  Sites of Onychomycosis Involvement (Check affected area)  [x] [x] [x] [x] [x] [x] [x] [x] [x] [x]  5 4 3 2 1 1 2 3 4 5                          Right                                        Left    Thickness  [x] [x] [x] [x] [x] [x] [x] [x] [x] [x]  5 4 3 2 1 1 2 3 4 5                         Right                                        Left    Dystrophic Changes   [x] [x] [x] [x] [x] [x] [x] [x] [x] [x]  5 4 3 2 1 1 2 3 4 5                         Right                                        Left    Color   [x] [x] [x] [x] [x] [x] [x] [x] [x] [x]  5 4 3 2 1 1 2 3 4 5                          Right                                        Left    Incurvation/Ingrowin   [] [] [] [] [] [] [] [] [] []  5 4 3 2 1 1 2 3 4 5                         Right                                        Left    Inflammation/Pain   [x] [x] [x] [x] [x] [x] [x] [x] [x] [x]  5 4 3 2 1 1 2 3 4 5                         Right                                        Left        Visual inspection:  Deformity: hammertoe deformity digna feet  amputation: absent  Skin lesions: absent  Edema: right- 2+ pitting edema, left- 2+ pitting edema    Sensory exam:  Monofilament sensation: abnormal - 6/10 via SW 5.07/10g monofilament to the plantar foot bilateral feet    Pulses: abnormal - 1/4 dorsalis pedis pulse and 0/4 Posterior tibial pulse,   Pinprick: Impaired  Proprioception: Impaired  Vibration (128 Hz): Impaired       DM with PVD       [x]Yes    []No      Assessment:  45 y.o. male with:   Diagnosis Orders   1. Type II diabetes mellitus with peripheral circulatory disorder (HCC)  [facility] DIABETES FOOT EXAM    60117 - [place] DEBRIDEMENT OF NAILS, 6 OR MORE   2. Dermatophytosis of nail  HM DIABETES FOOT EXAM    63426 - NC DEBRIDEMENT OF NAILS, 6 OR MORE   3. Bilateral foot pain  [facility] DIABETES FOOT EXAM    87394 - NC DEBRIDEMENT OF NAILS, 6 OR MORE   4. Peripheral vascular disorder (HCC)  [facility] DIABETES FOOT EXAM    77896 - NC DEBRIDEMENT OF NAILS, 6 OR MORE           Q7   []Yes    []No                Q8   [x]Yes    []No                     Q9   []Yes    []No    Plan:   Pt was evaluated and examined. Patient was given personalized discharge instructions. Nails 1-10 were debrided sharply in length and thickness with a nipper and , without incident. Pt will follow up in 9 weeks or sooner if any problems arise. Diagnosis was discussed with the pt and all of their questions were answered in detail. Proper foot hygiene and care was discussed with the pt. Informed patient on proper diabetic foot care and importance of tight glycemic control. Patient to check feet daily and contact the office with any questions/problems/concerns. Other comorbidity noted and will be managed by PCP.   11/4/2021    Electronically signed by Jim Price DPM on 11/4/2021 at 10:21 AM  11/4/2021

## 2021-12-29 NOTE — PROGRESS NOTES
[facility] PHYSICIANS  [facility]  [address] [address] [place] [address]  Dept: [phone]  Dept Fax: [phone]    DIABETIC PROGRESS NOTE  Date of patient's visit: 12/10/2020  Patient's Name:  Maira Rizvi YOB: 1970            Patient Care Team:  BRENNON Root CNP as PCP - General (Nurse Practitioner)  BRENNON Root CNP as PCP - [facility] Empaneled Provider  Afshan Nieto DPM as Physician (Podiatry)  Saundra Bran DPM as Physician (Podiatry)          Chief Complaint   Patient presents with    Diabetes    Peripheral Neuropathy    Foot Pain    Nail Problem    Benign Neoplasm       Subjective:   Maira Rizvi comes to clinic for Diabetes; Peripheral Neuropathy; Foot Pain; Nail Problem; and Benign Neoplasm    he is a diabetic and states that he is doing well. Pt currently has complaint of thickened, elongated nails that they cannot manage by themselves. Pt's primary care physician is BRENNON Root CNP last seen 09/22/2020. Pt's last blood sugar was 129 . Pt also relates to painful skin spots to the bottom of both feet. Pt has tried pads and changing shoes but it has note helped the pain    Pt has a new complaint of NONE. Lab Results   Component Value Date    LABA1C 5.7 09/22/2020      Complains of numbness in the feet bilat.   Past Medical History:   Diagnosis Date    Bundle branch block, left     [facility]; last visit (virtual) April 2020    Diabetes [facility])     GERD (gastroesophageal reflux disease)     History of echocardiogram 2016    [facility]; [facility]    Hyperlipidemia     Hypertension     PCP COLE Ponce NP, seen on 1/5/2020    Kidney calculi     Lumbar radiculopathy 6/9/2017    Non-ischemic cardiomyopathy ([facility] [address]     Obstructive sleep apnea syndrome 06/09/2017    No machine    PONV (postoperative nausea and vomiting)     needs scop patch for OR    Prolonged emergence from general anesthesia     Restless We have a fax needing clarification from the pharmacy needing to know if we meant to send a script for Depakote for 20 tabs (10 day supply)? Please advise and create a new script if needed. legs syndrome 6/9/2017    Severe obesity (BMI 35.0-39. 9) with comorbidity ([facility] [address] 7/31/2018    Wears dentures     Upper & lower       Allergies   Allergen Reactions    Keflex [Cephalexin] Diarrhea    Codeine Itching    Corticosteroids Swelling     Injection site swelling    Doxycycline      GERD    Ibuprofen Other (See Comments)     GERD    Metformin And Related Diarrhea     Current Outpatient Medications on File Prior to Visit   Medication Sig Dispense Refill    amitriptyline (ELAVIL) 25 MG tablet TAKE TWO TABLETS BY MOUTH ONCE A DAY AT BEDTIME 120 tablet 0    omeprazole (PRILOSEC) 20 MG delayed release capsule TAKE ONE CAPS BY MOUTH ONCE A DAY BEFORE BREAKFAST 30 capsule 5    atorvastatin (LIPITOR) 20 MG tablet TAKE ONE TABLET BY MOUTH ONCE A DAY AT BEDTIME 30 tablet 5    clotrimazole-betamethasone (LOTRISONE) 1-0.05 % cream APPLY TOPICALLY TWICE A DAY 90 g 2    tiZANidine (ZANAFLEX) 4 MG tablet Take one tab nighly 30 tablet 6    carbidopa-levodopa (SINEMET)  MG per tablet TAKE 1/2 TABLET BY MOUTH AT 8PM AND 11PM 30 tablet 6    zinc oxide 20 % ointment MIX WITH KETOCONAZOLE 1:1 AND APPLY TO GROIN CREASES NIGHTLY 60 g 3    ketoconazole (NIZORAL) 2 % cream MIX WITH ZINC OXIDE 1:1 AND APPLY TO GROIN CREASES NIGHTLY 60 g 2    ASPIRIN LOW DOSE 81 MG EC tablet Take 1 tablet by mouth daily      latanoprost (XALATAN) 0.005 % ophthalmic solution Place 1 drop into both eyes daily      BENZOYL PEROXIDE 5 % external wash WASH CHEST AND BACK 1 TO 2 TIMES DAILY 227 g 3    Misc.  Devices MISC 1 PAIR OF DIABETIC SHOES (1 LEFT/ 1 RIGHT)  1-3 PAIRS OF INSERTS (LEFT/ RIGHT) 2 each 0    vitamin D (ERGOCALCIFEROL) 1.25 MG (78462 UT) CAPS capsule Take 1 capsule by mouth once a week      GLUCAGON EMERGENCY 1 MG injection       isosorbide mononitrate (IMDUR) 30 MG extended release tablet Take 1 tablet by mouth daily      HYDRALAZINE HCL PO Take 50 mg by mouth daily      Alcohol Swabs (B-D SINGLE USE SWABS REGULAR) PADS USE BEFORE TESTING 4 TIMES A DAY PLUS BEFORE USING INSULIN (5 TIMES A DAY) 365 each 2    TRUEplus Lancets 33G MISC USE TO TEST BLOOD SUGAR FOUR TIMES DAILY 100 each 11    lisinopril (PRINIVIL;ZESTRIL) 5 MG tablet Take 5 mg by mouth nightly       metoprolol succinate (TOPROL XL) 25 MG extended release tablet Take 25 mg by mouth daily      nystatin (MYCOSTATIN) 949933 UNIT/GM cream Apply topically 2 times daily. 15 g 3    Semaglutide (OZEMPIC, 1 MG/DOSE, SC) Inject 1 mg into the skin once a week Every Sunday      Sulfacetamide Sodium 9.8 % LOTN Apply to nose and cheeks twice daily (Patient taking differently: Apply topically as needed Apply to nose and cheeks twice daily) 113 g 3    blood glucose monitor strips Test 4 times a day & as needed for symptoms of irregular blood glucose. 120 strip 3    Insulin Pen Needle (COMFORT EZ PEN NEEDLES) 32G X 4 MM MISC Use 5 times daily. Change needle each time with Victoza, Basaglar, and Humalog 100 each 11    ammonium lactate (AMLACTIN) 12 % cream APPLY TOPICALLY AS NEEDED. (Patient taking differently: Apply topically as needed Apply topically as needed.) 280 g 2    Lancet Devices (SIMPLE DIAGNOSTICS LANCING DEV) MISC USE TO TEST BLOOD SUGAR 4 TIMES A DAY 1 each 3    TRUE METRIX BLOOD GLUCOSE TEST strip TEST BLOOD SUGAR 3 TO 4 TIMES DAILY 100 each 1    PHARMACIST CHOICE ALCOHOL 70 % PADS USE BEFORE TESTING SUGAR (4 TIMES) PLUS BEFORE USING INSULIN (5 TIMES A DAY) 270 each 11     No current facility-administered medications on file prior to visit. Review of Systems    Review of Systems:   History obtained from chart review and the patient  General ROS: negative for - chills, fatigue, fever, night sweats or weight gain  Constitutional: Negative for chills, diaphoresis, fatigue, fever and unexpected weight change. Musculoskeletal: Positive for arthralgias, gait problem and joint swelling.   Neurological ROS: negative for - behavioral changes, confusion, headaches or seizures. Negative for weakness and numbness. Dermatological ROS: negative for - mole changes, rash  Cardiovascular: Negative for leg swelling. Gastrointestinal: Negative for constipation, diarrhea, nausea and vomiting. Objective:  Dermatologic Exam:  Skin lesion present to the right and left plantar foot with a central core and petchaie noted to the lesions periphery. Pain on palpation of the lesion  . Bula Dayhoff Skin is thin, with flaky sloughing skin as well as decreased hair growth to the lower leg  Small red hemosiderin deposits seen dorsal foot   Musculoskeletal:     1st MPJ ROM decreased, Bilateral.  Muscle strength 5/5, Bilateral.  Pain present upon palpation of toenails 1-5, Bilateral. decreased medial longitudinal arch, Bilateral.  Ankle ROM decreased,Bilateral.    Dorsally contracted digits present digits 2, Bilateral.     Vascular: DP pulses 1/4 bilateral.  PT pulses 0/4 bilateral.   CFT <5 seconds, Bilateral.  Hair growth absent to the level of the digits, Bilateral.  Edema present, Bilateral.  Varicosities absent, Bilateral. Erythema absent, Bilateral    Neurological: Sensation diminshed to light touch to level of digits, Bilateral.  Protective sensation intact 6/10 sites via 5.07/10g [place]-Chano Monofilament, Bilateral.  negative Tinel's, Bilateral.  negative Valleix sign, Bilateral.      Integument: Warm, dry, supple, Bilateral.  Open lesion absent, Bilateral.  Interdigital maceration absent to web spaces 4, Bilateral.  Nails 1-5 left and 1-5 right thickened > 3.0 mm, dystrophic and crumbly, discolored with subungual debris. Fissures absent, Bilateral.   General: AAO x 3 in NAD.     Derm  Toenail Description  Sites of Onychomycosis Involvement (Check affected area)  [x] [x] [x] [x] [x] [x] [x] [x] [x] [x]  5 4 3 2 1 1 2 3 4 5                          Right Left    Thickness  [x] [x] [x] [x] [x] [x] [x] [x] [x] [x]  5 4 3 2 1 1 2 3 4 5                         Right                                        Left    Dystrophic Changes   [x] [x] [x] [x] [x] [x] [x] [x] [x] [x]  5 4 3 2 1 1 2 3 4 5                         Right                                        Left    Color   [x] [x] [x] [x] [x] [x] [x] [x] [x] [x]  5 4 3 2 1 1 2 3 4 5                          Right                                        Left    Incurvation/Ingrowin   [] [] [] [] [] [] [] [] [] []  5 4 3 2 1 1 2 3 4 5                         Right                                        Left    Inflammation/Pain   [x] [x] [x] [x] [x] [x] [x] [x] [x] [x]  5 4 3 2 1 1 2 3 4 5                         Right                                        Left        Visual inspection:  Deformity: hammertoe deformity digna feet  amputation: absent  Skin lesions: present as abovce  Edema: right- 2+ pitting edema, left- 2+ pitting edema    Sensory exam:  Monofilament sensation: abnormal - 6/10 via SW 5.07/10g monofilament to the plantar foot bilateral feet    Pulses: abnormal - 1/4 dorsalis pedis pulse and 1/4 Posterior tibial pulse,   Pinprick: Impaired  Proprioception: Impaired  Vibration (128 Hz): Impaired       DM with PVD       [x]Yes    []No      Assessment:  45 y.o. male with:   Diagnosis Orders   1. Type II diabetes mellitus with peripheral circulatory disorder (HCC)   DIABETES FOOT EXAM    53127 - LA DEBRIDEMENT OF NAILS, 6 OR MORE    81865 - LA DESTRUCTION BENIGN LESIONS UP TO 14   2. Dermatophytosis of nail   DIABETES FOOT EXAM    56112 - [place] DEBRIDEMENT OF NAILS, 6 OR MORE   3. Peripheral vascular disorder (HCC)   DIABETES FOOT EXAM    16660 - LA DEBRIDEMENT OF NAILS, 6 OR MORE    56988 - LA DESTRUCTION BENIGN LESIONS UP TO 14   4. Benign neoplasm of skin of lower limb, including hip, right   DIABETES FOOT EXAM    31407 - LA DESTRUCTION BENIGN LESIONS UP TO 14   5.  Benign neoplasm of skin of lower limb,

## 2022-01-12 ENCOUNTER — OFFICE VISIT (OUTPATIENT)
Dept: PODIATRY | Age: 52
End: 2022-01-12
Payer: MEDICARE

## 2022-01-12 VITALS — WEIGHT: 283 LBS | HEIGHT: 73 IN | BODY MASS INDEX: 37.51 KG/M2

## 2022-01-12 DIAGNOSIS — M79.671 BILATERAL FOOT PAIN: ICD-10-CM

## 2022-01-12 DIAGNOSIS — D23.71 BENIGN NEOPLASM OF SKIN OF LOWER LIMB, INCLUDING HIP, RIGHT: ICD-10-CM

## 2022-01-12 DIAGNOSIS — I73.9 PERIPHERAL VASCULAR DISORDER (HCC): ICD-10-CM

## 2022-01-12 DIAGNOSIS — B35.1 DERMATOPHYTOSIS OF NAIL: ICD-10-CM

## 2022-01-12 DIAGNOSIS — M79.672 BILATERAL FOOT PAIN: ICD-10-CM

## 2022-01-12 DIAGNOSIS — D23.72 BENIGN NEOPLASM OF SKIN OF LOWER LIMB, INCLUDING HIP, LEFT: ICD-10-CM

## 2022-01-12 DIAGNOSIS — E11.51 TYPE II DIABETES MELLITUS WITH PERIPHERAL CIRCULATORY DISORDER (HCC): Primary | ICD-10-CM

## 2022-01-12 PROCEDURE — 17110 DESTRUCTION B9 LES UP TO 14: CPT | Performed by: PODIATRIST

## 2022-01-12 PROCEDURE — 99999 PR OFFICE/OUTPT VISIT,PROCEDURE ONLY: CPT | Performed by: PODIATRIST

## 2022-01-12 PROCEDURE — 11721 DEBRIDE NAIL 6 OR MORE: CPT | Performed by: PODIATRIST

## 2022-01-12 NOTE — PROGRESS NOTES
[address]  Dept: [phone]    DIABETIC PROGRESS NOTE  Date of patient's visit: 1/12/2022  Patient's Name:  Renata Macdonald. YOB: 1970            Patient Care Team:  Rosemarie Don MD as PCP - General (Family Medicine)  Hitesh Card DPM as Physician (Podiatry)  Yevgeniy Durham DPM as Physician (Podiatry)  Crystal Donaldson MD as Consulting Physician (Pulmonary Disease)          Chief Complaint   Patient presents with    Diabetes     [facility] & [place]     Peripheral Neuropathy       Subjective:   Renata Macdonald. comes to clinic for Diabetes [facility] [place] & [place] ) and Peripheral Neuropathy    he is a diabetic and states that needs toenails trimmed today. Pt currently has complaint of thickened, elongated nails that they cannot manage by themselves. Pt's primary care physician is Rosemarie Don MD last seen 10/25/21   Pt's last blood sugar was 7.4-2/24/21. Pt also relates to painful skin spots to the bottom of both feet. Pt has tried pads and changing shoes but it has note helped the pain        Pt has a new complaint of none today . Lab Results   Component Value Date    LABA1C 7.4 02/24/2021      Complains of numbness in the feet bilat.   Past Medical History:   Diagnosis Date    Bundle branch block, left     [facility]; last visit (virtual) April 2020    Diabetes [facility])     Diabetes mellitus ([facility] 6/9/2017    Diabetic polyneuropathy associated with type 2 diabetes mellitus ([facility] 5/4/2020    Gait difficulty 5/4/2020    GERD (gastroesophageal reflux disease)     History of echocardiogram 2016    [facility]; [facility]    Hyperlipidemia     Hypertension     PCP Adal Subramanian NP, seen on 1/5/2020    Kidney calculi     Lumbar radiculopathy 6/9/2017    Mass of right elbow 5/28/2020    Muscle spasms of both lower extremities 5/4/2020    Neuropathic pain of both legs 5/4/2020    Non-ischemic cardiomyopathy (HCC)     Numbness and tingling in both hands 5/29/2019    Obstructive sleep apnea syndrome 06/09/2017    No machine    PONV (postoperative nausea and vomiting)     needs scop patch for OR    Prolonged emergence from general anesthesia     Restless legs syndrome 6/9/2017    Restless legs syndrome (RLS) 6/9/2017    Right median nerve neuropathy 5/28/2020    Severe obesity (BMI 35.0-39. 9) with comorbidity ([address] 7/31/2018    Ulnar neuropathy of right upper extremity 5/28/2020    Wears dentures     Upper & lower       Allergies   Allergen Reactions    Keflex [Cephalexin] Diarrhea    Codeine Itching    Corticosteroids Swelling     Injection site swelling    Doxycycline      GERD    Ibuprofen Other (See Comments)     GERD    Metformin And Related Diarrhea     Current Outpatient Medications on File Prior to Visit   Medication Sig Dispense Refill    Cholecalciferol (VITAMIN D3) 125 MCG (5000 UT) TABS TAKE 1 TABLET BY MOUTH DAILY      fluticasone (FLONASE) 50 MCG/ACT nasal spray USE 1 SPRAY IN EACH NOSTRIL ONCE DAILY      hydrALAZINE (APRESOLINE) 50 MG tablet TAKE 1 TABLET BY MOUTH DAILY      LANTUS SOLOSTAR 100 UNIT/ML injection pen INJECT 20 UNITS UNDER THE SKIN TWICE DAILY      VICTOZA 18 MG/3ML SOPN SC injection INJECT 0.6MG ONCE A WEEK FOR TWO WEEKS THEN INJECT 1.2MG ONCE A DAY      ipratropium (ATROVENT) 0.06 % nasal spray USE 2 SPRAYS IN EACH NOSTRIL 4 TIMES DAILY 15 mL 9    carbidopa-levodopa (SINEMET CR)  MG per extended release tablet Take one tab at 8 pm + 11 pm 60 tablet 3    tiZANidine (ZANAFLEX) 4 MG tablet TAKE ONE TABLET BY MOUTH ONCE A DAY AT BEDTIME 90 tablet 0    omeprazole (PRILOSEC) 20 MG delayed release capsule TAKE ONE CAPSULE BY MOUTH ONCE A DAY BEFORE BREAKFAST 30 capsule 0    carbidopa-levodopa (SINEMET)  MG per tablet TAKE 1 TABLET BY MOUTH AT 8PM AND 11PM 180 tablet 1    atorvastatin (LIPITOR) 20 MG tablet TAKE ONE TABLET BY MOUTH ONCE A DAY AT BEDTIME 30 tablet 0    clotrimazole-betamethasone (LOTRISONE) 1-0.05 % cream APPLY TOPICALLY TWICE A DAY 90 g 2    TRUEplus Lancets 30G MISC USE TO TEST BLOOD SUGAR 4 TIMES A  each 0    zinc oxide 20 % ointment MIX WITH KETOCONAZOLE 1:1 AND APPLY TO GROIN CREASES NIGHTLY 60 g 3    ketoconazole (NIZORAL) 2 % cream MIX WITH ZINC OXIDE 1:1 AND APPLY TO GROIN CREASES NIGHTLY 60 g 2    ASPIRIN LOW DOSE 81 MG EC tablet Take 1 tablet by mouth daily      latanoprost (XALATAN) 0.005 % ophthalmic solution Place 1 drop into both eyes daily      BENZOYL PEROXIDE 5 % external wash WASH CHEST AND BACK 1 TO 2 TIMES DAILY 227 g 3    Misc. Devices MISC 1 PAIR OF DIABETIC SHOES (1 LEFT/ 1 RIGHT)  1-3 PAIRS OF INSERTS (LEFT/ RIGHT) 2 each 0    vitamin D (ERGOCALCIFEROL) 1.25 MG (33288 UT) CAPS capsule Take 1 capsule by mouth once a week      GLUCAGON EMERGENCY 1 MG injection       isosorbide mononitrate (IMDUR) 30 MG extended release tablet Take 1 tablet by mouth daily      Alcohol Swabs (B-D SINGLE USE SWABS REGULAR) PADS USE BEFORE TESTING 4 TIMES A DAY PLUS BEFORE USING INSULIN (5 TIMES A DAY) 365 each 2    lisinopril (PRINIVIL;ZESTRIL) 5 MG tablet Take 5 mg by mouth nightly       metoprolol succinate (TOPROL XL) 25 MG extended release tablet Take 25 mg by mouth daily      nystatin (MYCOSTATIN) 899888 UNIT/GM cream Apply topically 2 times daily. 15 g 3    Semaglutide (OZEMPIC, 1 MG/DOSE, SC) Inject 1 mg into the skin once a week Every Sunday      Sulfacetamide Sodium 9.8 % LOTN Apply to nose and cheeks twice daily (Patient taking differently: Apply topically as needed Apply to nose and cheeks twice daily) 113 g 3    Insulin Pen Needle (COMFORT EZ PEN NEEDLES) 32G X 4 MM MISC Use 5 times daily. Change needle each time with Victoza, Basaglar, and Humalog 100 each 11    ammonium lactate (AMLACTIN) 12 % cream APPLY TOPICALLY AS NEEDED.  (Patient taking differently: Apply topically as needed Apply topically as needed.) 280 g 2    Lancet Devices (SIMPLE DIAGNOSTICS LANCING DEV) MISC USE TO TEST BLOOD SUGAR 4 TIMES A DAY 1 each 3    PHARMACIST CHOICE ALCOHOL 70 % PADS USE BEFORE TESTING SUGAR (4 TIMES) PLUS BEFORE USING INSULIN (5 TIMES A DAY) 270 each 11    etodolac (LODINE) 400 MG tablet Take 1 tablet by mouth 2 times daily for 14 days 28 tablet 0    diclofenac (VOLTAREN) 75 MG EC tablet TAKE 1 TABLET BY MOUTH 2 TIMES DAILY (WITH MEALS) FOR 14 DAYS 28 tablet 0    amitriptyline (ELAVIL) 25 MG tablet Take 1.5 tab nightly 135 tablet 1     No current facility-administered medications on file prior to visit. Review of Systems    Review of Systems:   History obtained from chart review and the patient  General ROS: negative for - chills, fatigue, fever, night sweats or weight gain  Constitutional: Negative for chills, diaphoresis, fatigue, fever and unexpected weight change. Musculoskeletal: Positive for arthralgias, gait problem and joint swelling. Neurological ROS: negative for - behavioral changes, confusion, headaches or seizures. Negative for weakness and numbness. Dermatological ROS: negative for - mole changes, rash  Cardiovascular: Negative for leg swelling. Gastrointestinal: Negative for constipation, diarrhea, nausea and vomiting. Objective:  Dermatologic Exam:  Skin lesion present to the right and left plantar foot with a central core and petchaie noted to the lesions periphery.   Pain on palpation of the lesion    Skin is thin, with flaky sloughing skin as well as decreased hair growth to the lower leg  Small red hemosiderin deposits seen dorsal foot   Musculoskeletal:     1st MPJ ROM decreased, Bilateral.  Muscle strength 5/5, Bilateral.  Pain present upon palpation of toenails 1-5, Bilateral. decreased medial longitudinal arch, Bilateral.  Ankle ROM decreased,Bilateral.    Dorsally contracted digits present digits 2, Bilateral.     Vascular: DP pulses 1/4 bilateral.  PT pulses 0/4 bilateral.   CFT <5 seconds, Bilateral.  Hair growth absent to the level of the digits, Bilateral.  Edema present, Bilateral.  Varicosities absent, Bilateral. Erythema absent, Bilateral    Neurological: Sensation diminshed to light touch to level of digits, Bilateral.  Protective sensation intact 6/10 sites via 5.07/10g Court[place]-Chano Monofilament, Bilateral.  negative Tinel's, Bilateral.  negative Valleix sign, Bilateral.      Integument: Warm, dry, supple, Bilateral.  Open lesion absent, Bilateral.  Interdigital maceration absent to web spaces 4, Bilateral.  Nails 1-5 left and 1-5 right thickened > 3.0 mm, dystrophic and crumbly, discolored with subungual debris. Fissures absent, Bilateral.   General: AAO x 3 in NAD.     Derm  Toenail Description  Sites of Onychomycosis Involvement (Check affected area)  [x] [x] [x] [x] [x] [x] [x] [x] [x] [x]  5 4 3 2 1 1 2 3 4 5                          Right                                        Left    Thickness  [x] [x] [x] [x] [x] [x] [x] [x] [x] [x]  5 4 3 2 1 1 2 3 4 5                         Right                                        Left    Dystrophic Changes   [x] [x] [x] [x] [x] [x] [x] [x] [x] [x]  5 4 3 2 1 1 2 3 4 5                         Right                                        Left    Color   [x] [x] [x] [x] [x] [x] [x] [x] [x] [x]  5 4 3 2 1 1 2 3 4 5                          Right                                        Left    Incurvation/Ingrowin   [] [] [] [] [] [] [] [] [] []  5 4 3 2 1 1 2 3 4 5                         Right                                        Left    Inflammation/Pain   [x] [x] [x] [x] [x] [x] [x] [x] [x] [x]  5 4 3 2 1 1 2 3 4 5                         Right                                        Left        Visual inspection:  Deformity: hammertoe deformity digna feet  amputation: absent  Skin lesions: present - as above  Edema: right- 2+ pitting edema, left- 2+ pitting edema    Sensory exam:  Monofilament sensation: abnormal - 6/10 via SW 5.07/10g monofilament to the plantar foot bilateral feet    Pulses: abnormal - 1/4 dorsalis pedis pulse and 0/4 Posterior tibial pulse,   Pinprick: Impaired  Proprioception: Impaired  Vibration (128 Hz): Impaired       DM with PVD       [x]Yes    []No      Assessment:  45 y.o. male with:   Diagnosis Orders   1. Type II diabetes mellitus with peripheral circulatory disorder (HCC)  60787 - HI DEBRIDEMENT OF NAILS, 6 OR MORE    HM DIABETES FOOT EXAM    29358 - HI DESTRUCTION BENIGN LESIONS UP TO 14   2. Dermatophytosis of nail  32369 - HI DEBRIDEMENT OF NAILS, 6 OR MORE    HM DIABETES FOOT EXAM   3. Bilateral foot pain  76165 - HI DEBRIDEMENT OF NAILS, 6 OR MORE    HM DIABETES FOOT EXAM    30304 - HI DESTRUCTION BENIGN LESIONS UP TO 14   4. Peripheral vascular disorder (HCC)  13146 - HI DEBRIDEMENT OF NAILS, 6 OR MORE    HM DIABETES FOOT EXAM    49207 - HI DESTRUCTION BENIGN LESIONS UP TO 14   5. Benign neoplasm of skin of lower limb, including hip, right  HM DIABETES FOOT EXAM    92581 - HI DESTRUCTION BENIGN LESIONS UP TO 14   6. Benign neoplasm of skin of lower limb, including hip, left  HM DIABETES FOOT EXAM    48993 - HI DESTRUCTION BENIGN LESIONS UP TO 14       Q7   []Yes    []No                Q8   [x]Yes    []No                     Q9   []Yes    []No    Plan:   Pt was evaluated and examined. Patient was given personalized discharge instructions. The lesions were partially excised via 15 blade and silver nitrate was applied under occlusion. The patient tolerated the procedure well and without complication. Advised patient to use vasoline to the area after tomorrow to prevent surrounding tissue irritation. Nails 1-10 were debrided sharply in length and thickness with a nipper and , without incident. Pt will follow up in 9 weeks or sooner if any problems arise.  Diagnosis was discussed with the pt and all of their questions were answered in detail. Proper foot hygiene and care was discussed with the pt. Informed patient on proper diabetic foot care and importance of tight glycemic control. Patient to check feet daily and contact the office with any questions/problems/concerns.    Other comorbidity noted and will be managed by PCP.  1/12/2022    Electronically signed by Maria Del Rosario Carrillo DPM on 1/12/2022 at 1:17 PM  1/12/2022 independent

## 2022-01-12 NOTE — PATIENT INSTRUCTIONS
Schedule a Vaccine  When you qualify to receive the vaccine, call the [facility]) COVID-19 Vaccination Hotline to schedule your appointment or to get additional information about the [facility]) locations which are offering the COVID-19 vaccine. To be 94% effective, it's important that you receive two doses of one of the COVID-19 vaccines. -If you are receiving the Gonsalves Peter vaccine, your second shot will be scheduled as close to 21 days after the first shot as possible. -If you are receiving the Moderna vaccine, your second shot will be scheduled as close to 28 days after the first shot as possible. [facility]) COVID-19 Vaccination Hotline: [phone]    Links to [facility]) website and [facility] website:    [email or web address] [email or web address]    [email or web address]

## 2022-02-01 ENCOUNTER — HOSPITAL ENCOUNTER (OUTPATIENT)
Dept: NUCLEAR MEDICINE | Age: 52
Discharge: HOME | End: 2022-02-03
Payer: MEDICARE

## 2022-02-01 DIAGNOSIS — R06.02 SOB (SHORTNESS OF BREATH): ICD-10-CM

## 2022-02-01 DIAGNOSIS — I42.0 CONGESTIVE CARDIOMYOPATHY (HCC): ICD-10-CM

## 2022-02-01 DIAGNOSIS — I44.7 COMPLETE LEFT BUNDLE BRANCH BLOCK: ICD-10-CM

## 2022-02-01 LAB
LV EF: 44 %
LVEF MODALITY: NORMAL

## 2022-02-01 PROCEDURE — 78472 GATED HEART PLANAR SINGLE: CPT

## 2022-02-01 PROCEDURE — 3430000000 HC RX DIAGNOSTIC RADIOPHARMACEUTICAL: Performed by: INTERNAL MEDICINE

## 2022-02-01 PROCEDURE — 6360000002 HC RX W HCPCS: Performed by: INTERNAL MEDICINE

## 2022-02-01 PROCEDURE — A9560 TC99M LABELED RBC: HCPCS | Performed by: INTERNAL MEDICINE

## 2022-02-01 RX ADMIN — Medication 100 UNITS: at 11:15

## 2022-02-01 RX ADMIN — Medication 18 MILLICURIE: at 11:15

## 2022-02-03 RX ORDER — HEPARIN SODIUM (PORCINE) LOCK FLUSH IV SOLN 100 UNIT/ML 100 UNIT/ML
100 SOLUTION INTRAVENOUS PRN
Status: DISCONTINUED | OUTPATIENT
Start: 2022-02-03 | End: 2022-02-04 | Stop reason: HOSPADM

## 2022-03-10 ENCOUNTER — OFFICE VISIT (OUTPATIENT)
Dept: PULMONOLOGY | Age: 52
End: 2022-03-10
Payer: MEDICARE

## 2022-03-10 VITALS
SYSTOLIC BLOOD PRESSURE: 129 MMHG | HEART RATE: 105 BPM | HEIGHT: 73 IN | BODY MASS INDEX: 39.84 KG/M2 | DIASTOLIC BLOOD PRESSURE: 85 MMHG | OXYGEN SATURATION: 94 % | WEIGHT: 300.6 LBS

## 2022-03-10 DIAGNOSIS — G25.81 RESTLESS LEGS SYNDROME (RLS): ICD-10-CM

## 2022-03-10 DIAGNOSIS — Z87.891 SMOKING HISTORY: ICD-10-CM

## 2022-03-10 DIAGNOSIS — R09.82 POST-NASAL DRIP: ICD-10-CM

## 2022-03-10 DIAGNOSIS — J44.9 CHRONIC OBSTRUCTIVE PULMONARY DISEASE, UNSPECIFIED COPD TYPE (HCC): ICD-10-CM

## 2022-03-10 DIAGNOSIS — E66.09 OBESITY DUE TO EXCESS CALORIES, UNSPECIFIED OBESITY SEVERITY: ICD-10-CM

## 2022-03-10 DIAGNOSIS — G47.33 OSA (OBSTRUCTIVE SLEEP APNEA): Primary | ICD-10-CM

## 2022-03-10 DIAGNOSIS — Z87.891 PERSONAL HISTORY OF TOBACCO USE: ICD-10-CM

## 2022-03-10 DIAGNOSIS — I10 ESSENTIAL HYPERTENSION: ICD-10-CM

## 2022-03-10 PROCEDURE — G0296 VISIT TO DETERM LDCT ELIG: HCPCS | Performed by: INTERNAL MEDICINE

## 2022-03-10 PROCEDURE — 99214 OFFICE O/P EST MOD 30 MIN: CPT | Performed by: INTERNAL MEDICINE

## 2022-03-10 RX ORDER — INSULIN LISPRO 100 [IU]/ML
INJECTION, SOLUTION INTRAVENOUS; SUBCUTANEOUS
COMMUNITY
Start: 2022-01-19

## 2022-03-10 RX ORDER — CICLOPIROX 1 G/100ML
SHAMPOO TOPICAL
COMMUNITY
Start: 2022-01-01

## 2022-03-10 RX ORDER — METOPROLOL SUCCINATE 50 MG/1
TABLET, EXTENDED RELEASE ORAL
COMMUNITY
Start: 2022-02-21

## 2022-03-10 RX ORDER — DULAGLUTIDE 0.75 MG/.5ML
INJECTION, SOLUTION SUBCUTANEOUS
COMMUNITY
Start: 2022-02-24

## 2022-03-10 RX ORDER — IPRATROPIUM BROMIDE 42 UG/1
2 SPRAY, METERED NASAL 4 TIMES DAILY
Qty: 15 ML | Refills: 3 | Status: SHIPPED | OUTPATIENT
Start: 2022-03-10 | End: 2022-04-21 | Stop reason: SDUPTHER

## 2022-03-10 ASSESSMENT — SLEEP AND FATIGUE QUESTIONNAIRES
HOW LIKELY ARE YOU TO NOD OFF OR FALL ASLEEP WHEN YOU ARE A PASSENGER IN A CAR FOR AN HOUR WITHOUT A BREAK: 3
HOW LIKELY ARE YOU TO NOD OFF OR FALL ASLEEP WHILE SITTING AND TALKING TO SOMEONE: 1
HOW LIKELY ARE YOU TO NOD OFF OR FALL ASLEEP WHILE SITTING QUIETLY AFTER LUNCH WITHOUT ALCOHOL: 3
HOW LIKELY ARE YOU TO NOD OFF OR FALL ASLEEP WHILE LYING DOWN TO REST IN THE AFTERNOON WHEN CIRCUMSTANCES PERMIT: 2
HOW LIKELY ARE YOU TO NOD OFF OR FALL ASLEEP IN A CAR, WHILE STOPPED FOR A FEW MINUTES IN TRAFFIC: 0
HOW LIKELY ARE YOU TO NOD OFF OR FALL ASLEEP WHILE WATCHING TV: 3
ESS TOTAL SCORE: 17
HOW LIKELY ARE YOU TO NOD OFF OR FALL ASLEEP WHILE SITTING AND READING: 3
HOW LIKELY ARE YOU TO NOD OFF OR FALL ASLEEP WHILE SITTING INACTIVE IN A PUBLIC PLACE: 2

## 2022-03-10 NOTE — PATIENT INSTRUCTIONS
What is lung cancer screening? Lung cancer screening is a way in which doctors check the lungs for early signs of cancer in people who have no symptoms of lung cancer. A low-dose CT scan uses much less radiation than a normal CT scan and shows a more detailed image of the lungs than a standard X-ray. The goal of lung cancer screening is to find cancer early, before it has a chance to grow, spread, or cause problems. One large study found that smokers who were screened with low-dose CT scans were less likely to die of lung cancer than those who were screened with standard X-ray. Below is a summary of the things you need to know regarding screening for lung cancer with low-dose computed tomography (LDCT). This is a screening program that involves routine annual screening with LDCT studies of the lung. The LDCTs are done using low-dose radiation that is not thought to increase your cancer risk. If you have other serious medical conditions (other cancers, congestive heart failure) that limit your life expectancy to less than 10 years, you should not undergo lung cancer screening with LDCT. The chance is 20%-60% that the LDCT result will show abnormalities. This would require additional testing which could include repeat imaging or even invasive procedures. Most (about 95%) of \"abnormal\" LDCT results are false in the sense that no lung cancer is ultimately found. Additionally, some (about 10%) of the cancers found would not affect your life expectancy, even if undetected and untreated. If you are still smoking, the single most important thing that you can do to reduce your risk of dying of lung cancer is to quit. For this screening to be covered by Medicare and most other insurers, strict criteria must be met. If you do not meet these criteria, but still wish to undergo LDCT testing, you will be required to sign a waiver indicating your willingness to pay for the scan.     3/15/22-FAxed [facility] order w/dictation to [place] Ask #[phone]. Ligia Heller  3/21/22-Faxed above along with sleep studies and August dictation, at [facility] request, to #[phone]. Ligia Heller   3/25/22-CT wasn't scheduled at last office visit, call patient, transferred him to scheduling dept to do. Ligia Heller

## 2022-03-10 NOTE — PROGRESS NOTES
PULMONARY OP progress note            REFERRED BY: Enrique Mars MD    REASON FOR CONSULTATION: Sleep apnea    Patient is being seen in follow-up for-  1. BRIANNE (obstructive sleep apnea)    2. Obesity due to excess calories, unspecified obesity severity    3. Essential hypertension    4. Restless legs syndrome (RLS)    5. Smoking history    6. Chronic obstructive pulmonary disease, unspecified COPD type ([facility] [address]    7.  Post-nasal drip          HISTORY OF PRESENT ILLNESS:    Nael Alonso is a 47y.o. year old male here for evaluation of sleep apnea  Patient could not use the BiPAP as it was broke  Continues to have snoring with observed apnea with daytime sleepiness and fatigue and tiredness  No motor vehicle accidents  In addition to the symptoms with sleep apnea, patient does not have any narcolepsy symptoms nor hypothyroidism symptoms  Denied any shortness of breath or wheezing  Not much coughing or sputum production  No hemoptysis  No orthopnea or PND  No chest pain or pressure  No fever chills or night sweats  No weight loss or loss of appetite  Patient has nasal discharge and nasal congestion  Denied any restless leg syndrome symptoms        PAST MEDICAL HISTORY:       Diagnosis Date    Bundle branch block, left     [facility]; last visit (virtual) April 2020    Diabetes [facility])     Diabetes mellitus ([facility] [address] 6/9/2017    Diabetic polyneuropathy associated with type 2 diabetes mellitus ([facility] [address] 5/4/2020    Gait difficulty 5/4/2020    GERD (gastroesophageal reflux disease)     History of echocardiogram 2016    [facility]; [facility]    Hyperlipidemia     Hypertension     PCP BTwinnie Mcmahan NP, seen on 1/5/2020    Kidney calculi     Lumbar radiculopathy 6/9/2017    Mass of right elbow 5/28/2020    Muscle spasms of both lower extremities 5/4/2020    Neuropathic pain of both legs 5/4/2020    Non-ischemic cardiomyopathy (HCC)     Numbness and tingling in both hands 5/29/2019    Obstructive sleep apnea syndrome 06/09/2017    No machine    PONV (postoperative nausea and vomiting)     needs scop patch for OR    Prolonged emergence from general anesthesia     Restless legs syndrome 6/9/2017    Restless legs syndrome (RLS) 6/9/2017    Right median nerve neuropathy 5/28/2020    Severe obesity (BMI 35.0-39. 9) with comorbidity ([facility] [address] 7/31/2018    Ulnar neuropathy of right upper extremity 5/28/2020    Wears dentures     Upper & lower       SURGICAL HISTORY:    Past Surgical History:   Procedure Laterality Date    ANKLE SURGERY Right     ORIF    CARDIAC CATHETERIZATION  01/2020    \"at a [facility]\"; No blockages    CARPAL TUNNEL RELEASE Right 05/28/2020    CUBITAL, GUYONS CANAL AND CARPAL TUNNEL RELEASE     CARPAL TUNNEL RELEASE Right 5/28/2020    CUBITAL, GUYONS CANAL AND CARPAL TUNNEL RELEASE performed by Izaiah Beavers DO at [facility]    No polyps    GLAUCOMA SURGERY Bilateral 12/2018    HC INJECTION PROCEDURE FOR SACROILIAC JOINT Bilateral 2/21/2020    SACROILIAC JOINT INJECTION performed by Tom Thomas MD at [facility] Right     SINUS SURGERY         ALLERGIES:    Allergies   Allergen Reactions    Keflex [Cephalexin] Diarrhea    Codeine Itching    Corticosteroids Swelling     Injection site swelling    Doxycycline      GERD    Ibuprofen Other (See Comments)     GERD    Metformin And Related Diarrhea       MEDICATIONS:   Current Outpatient Medications   Medication Sig Dispense Refill    Ciclopirox 1 % SHAM 2-3 TIMES WEEKLY TO AXILLA AND GROIN AS NEEDED      TRULICITY 1.52 MZ/8.7JA SOPN AS DIRECTED SUBCUTANEOUS WEEKLY 30 DAYS 28      insulin lispro, 1 Unit Dial, 100 UNIT/ML SOPN INJECT UPTO 17 UNITS THREE TIMES DAILY AS PER SLIDING SCALE 30      metoprolol succinate (TOPROL XL) 50 MG extended release tablet TAKE 1 TABLET (50 MG TOTAL) BY MOUTH DAILY.       Cholecalciferol (VITAMIN D3) 125 MCG (5000 UT) TABS TAKE 1 TABLET BY MOUTH DAILY      fluticasone (FLONASE) 50 MCG/ACT nasal spray USE 1 SPRAY IN EACH NOSTRIL ONCE DAILY      hydrALAZINE (APRESOLINE) 50 MG tablet TAKE 1 TABLET BY MOUTH DAILY      LANTUS SOLOSTAR 100 UNIT/ML injection pen INJECT 20 UNITS UNDER THE SKIN TWICE DAILY      ipratropium (ATROVENT) 0.06 % nasal spray USE 2 SPRAYS IN EACH NOSTRIL 4 TIMES DAILY 15 mL 9    carbidopa-levodopa (SINEMET CR)  MG per extended release tablet Take one tab at 8 pm + 11 pm 60 tablet 3    tiZANidine (ZANAFLEX) 4 MG tablet TAKE ONE TABLET BY MOUTH ONCE A DAY AT BEDTIME 90 tablet 0    omeprazole (PRILOSEC) 20 MG delayed release capsule TAKE ONE CAPSULE BY MOUTH ONCE A DAY BEFORE BREAKFAST 30 capsule 0    carbidopa-levodopa (SINEMET)  MG per tablet TAKE 1 TABLET BY MOUTH AT 8PM AND 11PM 180 tablet 1    atorvastatin (LIPITOR) 20 MG tablet TAKE ONE TABLET BY MOUTH ONCE A DAY AT BEDTIME 30 tablet 0    clotrimazole-betamethasone (LOTRISONE) 1-0.05 % cream APPLY TOPICALLY TWICE A DAY 90 g 2    TRUEplus Lancets 30G MISC USE TO TEST BLOOD SUGAR 4 TIMES A  each 0    zinc oxide 20 % ointment MIX WITH KETOCONAZOLE 1:1 AND APPLY TO GROIN CREASES NIGHTLY 60 g 3    ketoconazole (NIZORAL) 2 % cream MIX WITH ZINC OXIDE 1:1 AND APPLY TO GROIN CREASES NIGHTLY 60 g 2    ASPIRIN LOW DOSE 81 MG EC tablet Take 1 tablet by mouth daily      latanoprost (XALATAN) 0.005 % ophthalmic solution Place 1 drop into both eyes daily      BENZOYL PEROXIDE 5 % external wash WASH CHEST AND BACK 1 TO 2 TIMES DAILY 227 g 3    Misc.  Devices MISC 1 PAIR OF DIABETIC SHOES (1 LEFT/ 1 RIGHT)  1-3 PAIRS OF INSERTS (LEFT/ RIGHT) 2 each 0    vitamin D (ERGOCALCIFEROL) 1.25 MG (19877 UT) CAPS capsule Take 1 capsule by mouth once a week      GLUCAGON EMERGENCY 1 MG injection       isosorbide mononitrate (IMDUR) 30 MG extended release tablet Take 1 tablet by mouth daily      Alcohol Swabs (B-D SINGLE USE SWABS REGULAR) PADS USE BEFORE TESTING 4 TIMES A DAY PLUS BEFORE USING INSULIN (5 TIMES A DAY) 365 each 2    lisinopril (PRINIVIL;ZESTRIL) 5 MG tablet Take 5 mg by mouth nightly       nystatin (MYCOSTATIN) 457647 UNIT/GM cream Apply topically 2 times daily. 15 g 3    Semaglutide (OZEMPIC, 1 MG/DOSE, SC) Inject 1 mg into the skin once a week Every Sunday      Sulfacetamide Sodium 9.8 % LOTN Apply to nose and cheeks twice daily (Patient taking differently: Apply topically as needed Apply to nose and cheeks twice daily) 113 g 3    Insulin Pen Needle (COMFORT EZ PEN NEEDLES) 32G X 4 MM MISC Use 5 times daily. Change needle each time with Victoza, Basaglar, and Humalog 100 each 11    ammonium lactate (AMLACTIN) 12 % cream APPLY TOPICALLY AS NEEDED. (Patient taking differently: Apply topically as needed Apply topically as needed.) 280 g 2    Lancet Devices (SIMPLE DIAGNOSTICS LANCING DEV) MISC USE TO TEST BLOOD SUGAR 4 TIMES A DAY 1 each 3    PHARMACIST CHOICE ALCOHOL 70 % PADS USE BEFORE TESTING SUGAR (4 TIMES) PLUS BEFORE USING INSULIN (5 TIMES A DAY) 270 each 11    VICTOZA 18 MG/3ML SOPN SC injection INJECT 0.6MG ONCE A WEEK FOR TWO WEEKS THEN INJECT 1.2MG ONCE A DAY      etodolac (LODINE) 400 MG tablet Take 1 tablet by mouth 2 times daily for 14 days 28 tablet 0    diclofenac (VOLTAREN) 75 MG EC tablet TAKE 1 TABLET BY MOUTH 2 TIMES DAILY (WITH MEALS) FOR 14 DAYS 28 tablet 0    amitriptyline (ELAVIL) 25 MG tablet Take 1.5 tab nightly 135 tablet 1     No current facility-administered medications for this visit.        FAMILY HISTORY: family history includes Alcohol Abuse in his father; Dementia in his father; Depression in his mother; Diabetes in his maternal grandfather and maternal grandmother; Heart Attack in his father; Heart Disease in his father; Heart Surgery in his father; High Cholesterol in his father and mother; Derek Casey in his father; No Known Problems in his half-brother, half-sister, paternal grandfather, and paternal grandmother; Thyroid Disease in his daughter. SOCIAL AND OCCUPATIONAL HEALTH:  The patient is a Past smoker of more than 30 pack years and quit smoking in 2010. There  is not history of TB or TB exposure. There is asbestos and silica dust exposure. The patient reports does not have coal, foundry, quarry or [employer] exposure. Travel history reveals no significant history of risk factors for pulm disease. Patient had exposure to automotive paints. There is not  history of recreational or IV drug use. The patient does not pets, dogs, cats turtles or exotic birds. Review of Systems:  Review of Systems -   General ROS: Completed and except as mentioned above were negative   Psychological ROS:  Completed and except as mentioned above were negative  Ophthalmic ROS:  Completed and except as mentioned above were negative  ENT ROS:  Completed and except as mentioned above were negative  Allergy and Immunology ROS:  Completed and except as mentioned above were negative  Hematological and Lymphatic ROS:  Completed and except as mentioned above were negative  Endocrine ROS: Completed and except as mentioned above were negative  Breast ROS:  Completed and except as mentioned above were negative  Respiratory ROS:  Completed and except as mentioned above were negative  Cardiovascular ROS:  Completed and except as mentioned above were negative  Gastrointestinal ROS: Completed and except as mentioned above were negative  Genito-Urinary ROS:  Completed and except as mentioned above were negative  Musculoskeletal ROS:  Completed and except as mentioned above were negative  Neurological ROS:  Completed and except as mentioned above were negative  Dermatological ROS:  Completed and except as mentioned above were negative    SLEEP  No epistaxis or sore throat. Has daytime sleepiness and fatigue and tiredness along with snoring and observed apnea and choking episodes during sleep.   Does not have a BiPAP machine currently  No MVA. No edema. PHYSICAL EXAMINATION:  Vitals:    03/10/22 1324   BP: 129/85   Pulse: 105   SpO2: 94%   Weight: (!) 300 lb 9.6 oz (136.4 kg)   Height: 6' 1\" (1.854 m)     PHYSICAL EXAMINATION:  Vitals:    03/10/22 1324   BP: 129/85   Pulse: 105   SpO2: 94%   Weight: (!) 300 lb 9.6 oz (136.4 kg)   Height: 6' 1\" (1.854 m)       Constitutional: Appears well, no distress,obese  EENT: PERRLA, sclera clear, anicteric, oropharynx clear, no lesions, neck supple with midline trachea. Neck: Supple, symmetrical, trachea midline, no adenopathy, no jvd skin normal  Respiratory: clear to auscultation, no wheezes or rales and unlabored breathing. No intercostal tenderness  Cardiovascular: regular rate and rhythm, normal S1, S2, no murmur noted  Abdomen: soft, nontender, nondistended, no masses or organomegaly  Extremities: No pedal edema, no clubbing or cyanosis        DATA:     pft's-March 2021 were within normal range    Polysomnogram showed mild sleep apnea that improved with BiPAP at a pressure of 17/13 centimeters of water    CXR: REVIEWED: In October 2019 without any acute problems      IMPRESSION:   1. BRIANNE (obstructive sleep apnea)    2. Obesity due to excess calories, unspecified obesity severity    3. Essential hypertension    4. Restless legs syndrome (RLS)    5. Smoking history    6. Chronic obstructive pulmonary disease, unspecified COPD type ([address]    7. Post-nasal drip                   PLAN:       Ordered a CT scan to screen for lung cancer  Refills were provided-the BiPAP pressure has been lowered to 13/9 centimeters of water and also patient was given Atrovent nasal prescription  Patient was recommended to have prednisone and an antibiotic available for use during an exacerbation  Educated and clarified the medication use. Discussed use, benefit, and side effects of prescribed medications. Barriers to medication compliance addressed.    David Quiñones. received counseling on the following healthy behaviors: nutrition, exercise and medication adherence  Recommend flu vaccination in the fall annually. Patient had flu vaccination for the season  Recommendations given regarding pneumococcal vaccinations. Patient had the COVID-19 vaccination  Patient is up-to-date with vaccinations from pulmonary perspective. Maintain an active lifestyle. Continue smoking cessation. Recommend pulmonary rehabilitation. Patient was explained importance of pulmonary rehabilitation with regards to decreasing the hospitalization risk and also help with his dyspnea  Patient was educated on how to use the respiratory medications. All the questions that the patient and the family has had were answered to their satisfaction. Home O2 evaluation was done. Supplemental oxygen was not needed  After reviewing the patient's smoking history and his age patient does not meet the criteria for lung cancer screening. BiPAP at a pressure of 13/9 Centimeters of water to be used overnight for at least 4 hours. This is the pressure that has been lowered from 17/13 due to patient complaining of high pressure  Weight loss was recommended and discussed. Recommended following good sleep hygiene instructions. Explained importance of compliance with treatment of sleep apnea. Pt is not to drive if sleepy. We'll see the patient back in 6 months or earlier if needed. Patient will call us if he is sick, so he can be seen sooner. Thank you for having us involved in the care of your patient. Please call us if you have any questions or concerns.               Dawson Frederick MD MD  3/10/2022 1:41 PM  Low Dose CT (LDCT) Lung Screening criteria met:     Age 55-77(Medicare) or 50-80 (USPST)   Pack year smoking >30 (Medicare) or >20 (USPST)   Still smoking or less than 15 year since quit   No sign or symptoms of lung cancer   > 11 months since last LDCT     Risks and benefits of lung cancer screening with LDCT scans discussed:    Significance of positive screen - False-positive LDCT results often occur. 95% of all positive results do not lead to a diagnosis of cancer. Usually further imaging can resolve most false-positive results; however, some patients may require invasive procedures. Over diagnosis risk - 10% to 12% of screen-detected lung cancer cases are over diagnosed--that is, the cancer would not have been detected in the patient's lifetime without the screening. Need for follow up screens annually to continue lung cancer screening effectiveness     Risks associated with radiation from annual LDCT- Radiation exposure is about the same as for a mammogram, which is about 1/3 of the annual background radiation exposure from everyday life. Starting screening at age 54 is not likely to increase cancer risk from radiation exposure. Patients with comorbidities resulting in life expectancy of < 10 years, or that would preclude treatment of an abnormality identified on CT, should not be screened due to lack of benefit.     To obtain maximal benefit from this screening, smoking cessation and long-term abstinence from smoking is critical

## 2022-03-25 ENCOUNTER — TELEPHONE (OUTPATIENT)
Dept: CASE MANAGEMENT | Age: 52
End: 2022-03-25

## 2022-03-25 ENCOUNTER — TELEPHONE (OUTPATIENT)
Dept: PULMONOLOGY | Age: 52
End: 2022-03-25

## 2022-03-25 NOTE — TELEPHONE ENCOUNTER
----- Message from Sona Beard sent at 3/25/2022  7:08 AM EDT -----  Rain Expose, CT lung screening ordered by Dr Shay Lopez on 3/10/22 fell into the overdue results folder. Please contact patient to see if this test was completed outside of a [facility] facility. If so please obtain results and if testing has not been completed please advise patient to complete. If testing needs to be rescheduled please do so.  Thank you.    ----- Message -----  From: SYSTEM  Sent: 3/25/2022   4:27 AM EDT  To: p Respiratory Spec Clinical Staff

## 2022-04-01 NOTE — TELEPHONE ENCOUNTER
CT was scheduled for 3/30. Unsure what the situation. Called patient, transferred him to scheduling to reschedule.

## 2022-04-18 ENCOUNTER — HOSPITAL ENCOUNTER (OUTPATIENT)
Age: 52
Discharge: HOME | End: 2022-04-18
Payer: MEDICARE

## 2022-04-18 LAB
ALBUMIN SERPL-MCNC: 4.3 G/DL (ref 3.5–5.2)
ALBUMIN/GLOBULIN RATIO: 1.5 (ref 1–2.5)
ALP BLD-CCNC: 158 U/L (ref 40–129)
ALT SERPL-CCNC: 23 U/L (ref 5–41)
ANION GAP SERPL CALCULATED.3IONS-SCNC: 11 MMOL/L (ref 9–17)
AST SERPL-CCNC: 24 U/L
BILIRUB SERPL-MCNC: 0.42 MG/DL (ref 0.3–1.2)
BUN BLDV-MCNC: 17 MG/DL (ref 6–20)
CALCIUM SERPL-MCNC: 9.5 MG/DL (ref 8.6–10.4)
CHLORIDE BLD-SCNC: 97 MMOL/L (ref 98–107)
CHOLESTEROL, FASTING: 162 MG/DL
CHOLESTEROL/HDL RATIO: 4.9
CO2: 26 MMOL/L (ref 20–31)
CREAT SERPL-MCNC: 0.7 MG/DL (ref 0.7–1.2)
ESTIMATED AVERAGE GLUCOSE: 206 MG/DL
GFR AFRICAN AMERICAN: >60 ML/MIN
GFR NON-AFRICAN AMERICAN: >60 ML/MIN
GFR SERPL CREATININE-BSD FRML MDRD: ABNORMAL ML/MIN/{1.73_M2}
GLUCOSE BLD-MCNC: 219 MG/DL (ref 70–99)
HBA1C MFR BLD: 8.8 % (ref 4–6)
HCT VFR BLD CALC: 48 % (ref 40.7–50.3)
HDLC SERPL-MCNC: 33 MG/DL
HEMOGLOBIN: 16.8 G/DL (ref 13–17)
IRON SATURATION: 30 % (ref 20–55)
IRON: 80 UG/DL (ref 59–158)
LDL CHOLESTEROL: 80 MG/DL (ref 0–130)
MCH RBC QN AUTO: 28.6 PG (ref 25.2–33.5)
MCHC RBC AUTO-ENTMCNC: 35 G/DL (ref 28.4–34.8)
MCV RBC AUTO: 81.8 FL (ref 82.6–102.9)
NRBC AUTOMATED: 0 PER 100 WBC
PDW BLD-RTO: 12.7 % (ref 11.8–14.4)
PLATELET # BLD: 214 K/UL (ref 138–453)
PMV BLD AUTO: 10.2 FL (ref 8.1–13.5)
POTASSIUM SERPL-SCNC: 4.2 MMOL/L (ref 3.7–5.3)
PRO-BNP: 133 PG/ML
RBC # BLD: 5.87 M/UL (ref 4.21–5.77)
SODIUM BLD-SCNC: 134 MMOL/L (ref 135–144)
THYROXINE, FREE: 0.96 NG/DL (ref 0.93–1.7)
TOTAL CK: 23 U/L (ref 39–308)
TOTAL IRON BINDING CAPACITY: 268 UG/DL (ref 250–450)
TOTAL PROTEIN: 7.2 G/DL (ref 6.4–8.3)
TRIGLYCERIDE, FASTING: 246 MG/DL
TSH SERPL DL<=0.05 MIU/L-ACNC: 1.33 UIU/ML (ref 0.3–5)
UNSATURATED IRON BINDING CAPACITY: 188 UG/DL (ref 112–347)
VITAMIN B-12: 460 PG/ML (ref 232–1245)
VITAMIN D 25-HYDROXY: 23.1 NG/ML
WBC # BLD: 8 K/UL (ref 3.5–11.3)

## 2022-04-18 PROCEDURE — 80061 LIPID PANEL: CPT

## 2022-04-18 PROCEDURE — 82550 ASSAY OF CK (CPK): CPT

## 2022-04-18 PROCEDURE — 85027 COMPLETE CBC AUTOMATED: CPT

## 2022-04-18 PROCEDURE — 84439 ASSAY OF FREE THYROXINE: CPT

## 2022-04-18 PROCEDURE — 82607 VITAMIN B-12: CPT

## 2022-04-18 PROCEDURE — 84443 ASSAY THYROID STIM HORMONE: CPT

## 2022-04-18 PROCEDURE — 82306 VITAMIN D 25 HYDROXY: CPT

## 2022-04-18 PROCEDURE — 83540 ASSAY OF IRON: CPT

## 2022-04-18 PROCEDURE — 83036 HEMOGLOBIN GLYCOSYLATED A1C: CPT

## 2022-04-18 PROCEDURE — 80053 COMPREHEN METABOLIC PANEL: CPT

## 2022-04-18 PROCEDURE — 83550 IRON BINDING TEST: CPT

## 2022-04-18 PROCEDURE — 83880 ASSAY OF NATRIURETIC PEPTIDE: CPT

## 2022-04-18 PROCEDURE — 36415 COLL VENOUS BLD VENIPUNCTURE: CPT

## 2022-04-19 ENCOUNTER — OFFICE VISIT (OUTPATIENT)
Dept: PODIATRY | Age: 52
End: 2022-04-19
Payer: MEDICARE

## 2022-04-19 VITALS — HEIGHT: 73 IN | BODY MASS INDEX: 39.76 KG/M2 | WEIGHT: 300 LBS | RESPIRATION RATE: 16 BRPM

## 2022-04-19 DIAGNOSIS — D23.72 BENIGN NEOPLASM OF SKIN OF LOWER LIMB, INCLUDING HIP, LEFT: ICD-10-CM

## 2022-04-19 DIAGNOSIS — D23.71 BENIGN NEOPLASM OF SKIN OF LOWER LIMB, INCLUDING HIP, RIGHT: ICD-10-CM

## 2022-04-19 DIAGNOSIS — B35.1 DERMATOPHYTOSIS OF NAIL: ICD-10-CM

## 2022-04-19 DIAGNOSIS — E11.51 TYPE II DIABETES MELLITUS WITH PERIPHERAL CIRCULATORY DISORDER (HCC): Primary | ICD-10-CM

## 2022-04-19 DIAGNOSIS — M79.671 BILATERAL FOOT PAIN: ICD-10-CM

## 2022-04-19 DIAGNOSIS — I73.9 PERIPHERAL VASCULAR DISORDER (HCC): ICD-10-CM

## 2022-04-19 DIAGNOSIS — M79.672 BILATERAL FOOT PAIN: ICD-10-CM

## 2022-04-19 PROCEDURE — 11721 DEBRIDE NAIL 6 OR MORE: CPT | Performed by: PODIATRIST

## 2022-04-19 PROCEDURE — 99999 PR OFFICE/OUTPT VISIT,PROCEDURE ONLY: CPT | Performed by: PODIATRIST

## 2022-04-19 PROCEDURE — 17110 DESTRUCTION B9 LES UP TO 14: CPT | Performed by: PODIATRIST

## 2022-04-19 NOTE — PROGRESS NOTES
[address] [facility] PODIATRY [facility]  [id number] [place] [id number]  Dept: [phone]  Dept Fax: [phone]    DIABETIC PROGRESS NOTE  Date of patient's visit: 4/19/2022  Patient's Name:  Nini Lorenzo YOB: 1970            Patient Care Team:  Orlin Stiles MD as PCP - General (Family Medicine)  Yfn Fajardo DPM as Physician (Podiatry)  Bouchra Casper DPM as Physician (Podiatry)  Swapna Blum MD as Consulting Physician (Pulmonary Disease)          Chief Complaint   Patient presents with    Diabetes    Benign Neoplasm    Nail Problem    Foot Pain       Subjective:   Nini Lorenzo comes to clinic for Diabetes, Benign Neoplasm, Nail Problem, and Foot Pain    he is a diabetic and states that he is doing well. Pt currently has complaint of thickened, elongated nails that they cannot manage by themselves. Pt's primary care physician is Orlin Stiles MD last seen 10/25/2021   Pt's last blood sugar was 187. Pt also relates to painful skin spots to the bottom of both feet. Pt has tried pads and changing shoes but it has note helped the pain      Pt has a new complaint of none today. Lab Results   Component Value Date    LABA1C 8.8 (H) 04/18/2022      Complains of numbness in the feet bilat.   Past Medical History:   Diagnosis Date    Bundle branch block, left     [facility]; last visit (virtual) April 2020    Diabetes [facility])     Diabetes mellitus ([facility] [address] 6/9/2017    Diabetic polyneuropathy associated with type 2 diabetes mellitus ([address] 5/4/2020    Gait difficulty 5/4/2020    GERD (gastroesophageal reflux disease)     History of echocardiogram 2016    [facility]; [facility]    Hyperlipidemia     Hypertension     PCP BFlorariane Patel NP, seen on 1/5/2020    Kidney calculi     Lumbar radiculopathy 6/9/2017    Mass of right elbow 5/28/2020    Muscle spasms of both lower extremities 5/4/2020    Neuropathic pain of both legs 5/4/2020    Non-ischemic cardiomyopathy (HCC)     Numbness and tingling in both hands 5/29/2019    Obstructive sleep apnea syndrome 06/09/2017    No machine    PONV (postoperative nausea and vomiting)     needs scop patch for OR    Prolonged emergence from general anesthesia     Restless legs syndrome 6/9/2017    Restless legs syndrome (RLS) 6/9/2017    Right median nerve neuropathy 5/28/2020    Severe obesity (BMI 35.0-39. 9) with comorbidity ([facility] [address] 7/31/2018    Ulnar neuropathy of right upper extremity 5/28/2020    Wears dentures     Upper & lower       Allergies   Allergen Reactions    Keflex [Cephalexin] Diarrhea    Codeine Itching    Corticosteroids Swelling     Injection site swelling    Doxycycline      GERD    Ibuprofen Other (See Comments)     GERD    Metformin And Related Diarrhea     Current Outpatient Medications on File Prior to Visit   Medication Sig Dispense Refill    Ciclopirox 1 % SHAM 2-3 TIMES WEEKLY TO AXILLA AND GROIN AS NEEDED      TRULICITY 0.73 IJ/6.0ER SOPN AS DIRECTED SUBCUTANEOUS WEEKLY 30 DAYS 28      insulin lispro, 1 Unit Dial, 100 UNIT/ML SOPN INJECT UPTO 17 UNITS THREE TIMES DAILY AS PER SLIDING SCALE 30      metoprolol succinate (TOPROL XL) 50 MG extended release tablet TAKE 1 TABLET (50 MG TOTAL) BY MOUTH DAILY.       ipratropium (ATROVENT) 0.06 % nasal spray 2 sprays by Each Nostril route 4 times daily 15 mL 3    Cholecalciferol (VITAMIN D3) 125 MCG (5000 UT) TABS TAKE 1 TABLET BY MOUTH DAILY      fluticasone (FLONASE) 50 MCG/ACT nasal spray USE 1 SPRAY IN EACH NOSTRIL ONCE DAILY      hydrALAZINE (APRESOLINE) 50 MG tablet TAKE 1 TABLET BY MOUTH DAILY      LANTUS SOLOSTAR 100 UNIT/ML injection pen INJECT 20 UNITS UNDER THE SKIN TWICE DAILY      VICTOZA 18 MG/3ML SOPN SC injection INJECT 0.6MG ONCE A WEEK FOR TWO WEEKS THEN INJECT 1.2MG ONCE A DAY      ipratropium (ATROVENT) 0.06 % nasal spray USE 2 SPRAYS IN EACH NOSTRIL 4 TIMES DAILY 15 mL 9    carbidopa-levodopa (SINEMET CR)  MG per extended release tablet Take one tab at 8 pm + 11 pm 60 tablet 3    tiZANidine (ZANAFLEX) 4 MG tablet TAKE ONE TABLET BY MOUTH ONCE A DAY AT BEDTIME 90 tablet 0    omeprazole (PRILOSEC) 20 MG delayed release capsule TAKE ONE CAPSULE BY MOUTH ONCE A DAY BEFORE BREAKFAST 30 capsule 0    carbidopa-levodopa (SINEMET)  MG per tablet TAKE 1 TABLET BY MOUTH AT 8PM AND 11PM 180 tablet 1    atorvastatin (LIPITOR) 20 MG tablet TAKE ONE TABLET BY MOUTH ONCE A DAY AT BEDTIME 30 tablet 0    clotrimazole-betamethasone (LOTRISONE) 1-0.05 % cream APPLY TOPICALLY TWICE A DAY 90 g 2    TRUEplus Lancets 30G MISC USE TO TEST BLOOD SUGAR 4 TIMES A  each 0    zinc oxide 20 % ointment MIX WITH KETOCONAZOLE 1:1 AND APPLY TO GROIN CREASES NIGHTLY 60 g 3    ketoconazole (NIZORAL) 2 % cream MIX WITH ZINC OXIDE 1:1 AND APPLY TO GROIN CREASES NIGHTLY 60 g 2    ASPIRIN LOW DOSE 81 MG EC tablet Take 1 tablet by mouth daily      latanoprost (XALATAN) 0.005 % ophthalmic solution Place 1 drop into both eyes daily      Misc. Devices MISC 1 PAIR OF DIABETIC SHOES (1 LEFT/ 1 RIGHT)  1-3 PAIRS OF INSERTS (LEFT/ RIGHT) 2 each 0    vitamin D (ERGOCALCIFEROL) 1.25 MG (92563 UT) CAPS capsule Take 1 capsule by mouth once a week      GLUCAGON EMERGENCY 1 MG injection       isosorbide mononitrate (IMDUR) 30 MG extended release tablet Take 1 tablet by mouth daily      Alcohol Swabs (B-D SINGLE USE SWABS REGULAR) PADS USE BEFORE TESTING 4 TIMES A DAY PLUS BEFORE USING INSULIN (5 TIMES A DAY) 365 each 2    lisinopril (PRINIVIL;ZESTRIL) 5 MG tablet Take 5 mg by mouth nightly       nystatin (MYCOSTATIN) 176008 UNIT/GM cream Apply topically 2 times daily.  15 g 3    Semaglutide (OZEMPIC, 1 MG/DOSE, SC) Inject 1 mg into the skin once a week Every Sunday      Sulfacetamide Sodium 9.8 % LOTN Apply to nose and cheeks twice daily (Patient taking differently: Apply topically as needed Apply to nose and cheeks twice daily) 113 g 3    Insulin Pen Needle (COMFORT EZ PEN NEEDLES) 32G X 4 MM MISC Use 5 times daily. Change needle each time with Victoza, Basaglar, and Humalog 100 each 11    ammonium lactate (AMLACTIN) 12 % cream APPLY TOPICALLY AS NEEDED. (Patient taking differently: Apply topically as needed Apply topically as needed.) 280 g 2    Lancet Devices (SIMPLE DIAGNOSTICS LANCING DEV) MISC USE TO TEST BLOOD SUGAR 4 TIMES A DAY 1 each 3    PHARMACIST CHOICE ALCOHOL 70 % PADS USE BEFORE TESTING SUGAR (4 TIMES) PLUS BEFORE USING INSULIN (5 TIMES A DAY) 270 each 11    etodolac (LODINE) 400 MG tablet Take 1 tablet by mouth 2 times daily for 14 days 28 tablet 0    diclofenac (VOLTAREN) 75 MG EC tablet TAKE 1 TABLET BY MOUTH 2 TIMES DAILY (WITH MEALS) FOR 14 DAYS 28 tablet 0    amitriptyline (ELAVIL) 25 MG tablet Take 1.5 tab nightly 135 tablet 1    BENZOYL PEROXIDE 5 % external wash WASH CHEST AND BACK 1 TO 2 TIMES DAILY 227 g 3     No current facility-administered medications on file prior to visit. Review of Systems    Review of Systems:   History obtained from chart review and the patient  General ROS: negative for - chills, fatigue, fever, night sweats or weight gain  Constitutional: Negative for chills, diaphoresis, fatigue, fever and unexpected weight change. Musculoskeletal: Positive for arthralgias, gait problem and joint swelling. Neurological ROS: negative for - behavioral changes, confusion, headaches or seizures. Negative for weakness and numbness. Dermatological ROS: negative for - mole changes, rash  Cardiovascular: Negative for leg swelling. Gastrointestinal: Negative for constipation, diarrhea, nausea and vomiting. Objective:  Dermatologic Exam:  Skin lesion present to the right and left plantar foot with a central core and petchaie noted to the lesions periphery.   Pain on palpation of the lesion    Skin is thin, with flaky sloughing skin as well as decreased hair growth to the lower leg  Small red hemosiderin deposits seen dorsal foot   Musculoskeletal:     1st MPJ ROM decreased, Bilateral.  Muscle strength 5/5, Bilateral.  Pain present upon palpation of toenails 1-5, Bilateral. decreased medial longitudinal arch, Bilateral.  Ankle ROM decreased,Bilateral.    Dorsally contracted digits present digits 2, Bilateral.     Vascular: DP pulses 1/4 bilateral.  PT pulses 0/4 bilateral.   CFT <5 seconds, Bilateral.  Hair growth absent to the level of the digits, Bilateral.  Edema present, Bilateral.  Varicosities absent, Bilateral. Erythema absent, Bilateral    Neurological: Sensation diminshed to light touch to level of digits, Bilateral.  Protective sensation intact 6/10 sites via 5.07/10g [place] Monofilament, Bilateral.  negative Tinel's, Bilateral.  negative Valleix sign, Bilateral.      Integument: Warm, dry, supple, Bilateral.  Open lesion absent, Bilateral.  Interdigital maceration absent to web spaces 4, Bilateral.  Nails 1-5 left and 1-5 right thickened > 3.0 mm, dystrophic and crumbly, discolored with subungual debris. Fissures absent, Bilateral.   General: AAO x 3 in NAD.     Derm  Toenail Description  Sites of Onychomycosis Involvement (Check affected area)  [x] [x] [x] [x] [x] [x] [x] [x] [x] [x]  5 4 3 2 1 1 2 3 4 5                          Right                                        Left    Thickness  [x] [x] [x] [x] [x] [x] [x] [x] [x] [x]  5 4 3 2 1 1 2 3 4 5                         Right                                        Left    Dystrophic Changes   [x] [x] [x] [x] [x] [x] [x] [x] [x] [x]  5 4 3 2 1 1 2 3 4 5                         Right                                        Left    Color   [x] [x] [x] [x] [x] [x] [x] [x] [x] [x]  5 4 3 2 1 1 2 3 4 5                          Right                                        Left    Incurvation/Ingrowin   [] [] [] [] [] [] [] [] [] []  5 4 3 2 1 1 2 3 4 5                         Right Left    Inflammation/Pain   [x] [x] [x] [x] [x] [x] [x] [x] [x] [x]  5 4 3 2 1 1 2 3 4 5                         Right                                        Left        Visual inspection:  Deformity: hammertoe deformity digna feet  amputation: absent  Skin lesions: present - as abovce  Edema: right- 2+ pitting edema, left- 2+ pitting edema    Sensory exam:  Monofilament sensation: abnormal - 6/10 via SW 5.07/10g monofilament to the plantar foot bilateral feet    Pulses: abnormal - 1/4 dorsalis pedis pulse and 0/4 Posterior tibial pulse,   Pinprick: Impaired  Proprioception: Impaired  Vibration (128 Hz): Impaired       DM with PVD       [x]Yes    []No      Assessment:  47 y.o. male with:   Diagnosis Orders   1. Type II diabetes mellitus with peripheral circulatory disorder (HCC)   DIABETES FOOT EXAM    ID DEBRIDEMENT OF NAILS, 6 OR MORE    ID DESTRUCTION BENIGN LESIONS UP TO 14   2. Dermatophytosis of nail   DIABETES FOOT EXAM    ID DEBRIDEMENT OF NAILS, 6 OR MORE   3. Bilateral foot pain   DIABETES FOOT EXAM    ID DEBRIDEMENT OF NAILS, 6 OR MORE    ID DESTRUCTION BENIGN LESIONS UP TO 14   4. Benign neoplasm of skin of lower limb, including hip, right   DIABETES FOOT EXAM    ID DESTRUCTION BENIGN LESIONS UP TO 14   5. Benign neoplasm of skin of lower limb, including hip, left   DIABETES FOOT EXAM    ID DESTRUCTION BENIGN LESIONS UP TO 14   6. Peripheral vascular disorder (HCC)   DIABETES FOOT EXAM    ID DEBRIDEMENT OF NAILS, 6 OR MORE    ID DESTRUCTION BENIGN LESIONS UP TO 14    diclofenac sodium (VOLTAREN) 1 % GEL           Q7   []Yes    []No                Q8   [x]Yes    []No                     Q9   []Yes    []No    Plan:   Pt was evaluated and examined. Patient was given personalized discharge instructions.        rx provided for volaren gel to apply to top of foot when painful    The lesions were partially excised via 15 blade and silver nitrate was applied under occlusion. The patient tolerated the procedure well and without complication. Advised patient to use vasoline to the area after tomorrow to prevent surrounding tissue irritation. Nails 1-10 were debrided sharply in length and thickness with a nipper and , without incident. Pt will follow up in 9 weeks or sooner if any problems arise. Diagnosis was discussed with the pt and all of their questions were answered in detail. Proper foot hygiene and care was discussed with the pt. Informed patient on proper diabetic foot care and importance of tight glycemic control. Patient to check feet daily and contact the office with any questions/problems/concerns.    Other comorbidity noted and will be managed by PCP.  4/19/2022    Electronically signed by Giancarlo Moore DPM on 4/19/2022 at 11:38 AM  4/19/2022

## 2022-04-21 ENCOUNTER — OFFICE VISIT (OUTPATIENT)
Dept: NEUROLOGY | Age: 52
End: 2022-04-21
Payer: MEDICARE

## 2022-04-21 ENCOUNTER — TELEPHONE (OUTPATIENT)
Dept: NEUROLOGY | Age: 52
End: 2022-04-21

## 2022-04-21 VITALS
HEIGHT: 73 IN | SYSTOLIC BLOOD PRESSURE: 120 MMHG | BODY MASS INDEX: 39.89 KG/M2 | DIASTOLIC BLOOD PRESSURE: 78 MMHG | WEIGHT: 301 LBS | HEART RATE: 86 BPM

## 2022-04-21 DIAGNOSIS — E85.1 AMYLOID NEUROPATHY (HCC): ICD-10-CM

## 2022-04-21 DIAGNOSIS — G63 AMYLOID NEUROPATHY (HCC): ICD-10-CM

## 2022-04-21 DIAGNOSIS — R52 INTRACTABLE PAIN: ICD-10-CM

## 2022-04-21 DIAGNOSIS — M79.2 NEUROPATHIC PAIN: Primary | ICD-10-CM

## 2022-04-21 DIAGNOSIS — E11.42 DIABETIC POLYNEUROPATHY ASSOCIATED WITH TYPE 2 DIABETES MELLITUS (HCC): ICD-10-CM

## 2022-04-21 PROCEDURE — 3052F HG A1C>EQUAL 8.0%<EQUAL 9.0%: CPT | Performed by: PSYCHIATRY & NEUROLOGY

## 2022-04-21 PROCEDURE — 99214 OFFICE O/P EST MOD 30 MIN: CPT | Performed by: PSYCHIATRY & NEUROLOGY

## 2022-04-21 RX ORDER — SACUBITRIL AND VALSARTAN 24; 26 MG/1; MG/1
1 TABLET, FILM COATED ORAL 2 TIMES DAILY
COMMUNITY

## 2022-04-21 RX ORDER — TAPENTADOL HYDROCHLORIDE 50 MG/1
50 TABLET, FILM COATED ORAL DAILY
Qty: 7 TABLET | Refills: 0 | Status: SHIPPED | OUTPATIENT
Start: 2022-04-21 | End: 2022-05-01

## 2022-04-21 NOTE — TELEPHONE ENCOUNTER
Dr Angel Morales is requesting this pt get a punch biopsy by Dr Amina Bowman. Pt has also chosen to follow up with Jennifer since Edison's leaving.

## 2022-04-21 NOTE — PROGRESS NOTES
NEUROLOGY FOLLOW-UP VISIT   Patient Name:       Jovanni Hui :        1970  Clinic Visit Date:    2022        Dear Dr. Ivan Roger MD       I saw Mr. Jovanni Hui in follow-up visit today in continuation of neurologic care. As you know he  is a 47 y.o.  male  with diabetic peripheral polyneuropathy with superimposed digna CTS. He had Rt cubital, Guyons canal and carpal tunnel release on 2020. He comes to the visit stating he is concerned about amyloid neuropathy as he has some of symptoms of systemic amyloidosis with cardiac involvement as per his cardiologist.   He admits to having intermittent orthostatic dizziness and also had uncontrollable \"100 times severe pins-and-needles sensations in lower extremities with diabetic neuropathy\". He could not tolerate most of the medications and he is only taking \"tiny doses of amitriptyline\" and requesting about any alternatives for intractable ongoing neuropathy pain. He further added \"I feel like I want to cut off both of my feet with severe disabling pain\". He also stated that he has been having restless leg syndrome and it is not getting any better with Sinemet. He could not tolerate it Requip in the past.  He is tolerating it Sinemet 25/100 at 8 PM and at bedtime well without any adverse effects. He still has ongoing pins-and-needles sensations in lower extremities. He tried Capsaicin cream and could not tolerate it. Afterwards, he visited pain clinic and he was on liquid preparation med, ? Topical solution with some relief. He also stated that he he was seen by chiropractor and then had MRI lumbar spine and it was abnormal for which he was subsequently referred to neuosurgeon for \"pinched nerve\". He did not want any discogram or surgical interventions. He wants to continue chiropractor interventions at this point of time.   Neuropathy described as significantly abnormal and painful sensations with numbness and tingling in fingers along with stinging sensation and cramps in both calves. He has restless legs with intermittent myoclonic jerks through the night. He also has been having significant gait difficulties with \"occasional right leg giving out\". Taking smaller dose of Sinemet with the partial relief. He has ongoing dysesthesias in bilateral lower extremities with marginal relief with amitriptyline. Increasing the dose of amitriptyline is making him lethargic. He is taking 50 mg every night. He was borderline diabetic since 2014 or so. He is on multiple diabetic medications including insulin, Victoza, etc.  His blood sugars had been under control but his symptoms had been getting worse. He has had NCS/EMG testing of lower extremities and he was told to have neuropathy. He was on gabapentin 300 mg 3 times daily and it has caused increased sleepiness and he stopped taking it. He also tried Lyrica and Cymbalta with no help. He has been having stinging sensation along with tingling and numbness in lower legs and feet. He also has been having painful sensations in both hands and those are described as clue-like sensation in medial aspect of both forearms and last 2 digits. He has been having trouble walking with increased pain upon walking. He denies radiating pain and paresthesias across the lower back. Denies bladder and bowel incontinence. He has been having extreme difficulty walking with unsteady gait with progressive pain in bilateral lower extremities. Patient states he is getting a compound cream from [employer] (gabapentin 10%, ketoprofen 10%, lidocaine 2%, prilocaine 2% solution) to be applied 2-5 drops to area of pain up to 4 times a day prn      All items selected indicate a positive finding. Those items not selected are negative.   Constitutional [] Weight loss/gain   [] Fatigue  [] Fever/Chills   HEENT [] Hearing Loss  [] Visual Disturbance  [] Tinnitus  [] Eye pain   Respiratory [] Shortness of Breath  [] Cough  [] Snoring   Cardiovascular [] Chest Pain  [] Palpitations  [] Lightheaded   GI [] Constipation  [] Diarrhea  [] Swallowing change    [] Urinary Frequency  [] Urinary Urgency   Musculoskeletal [] Neck pain  [] Back pain  [] Muscle pain  [x] Restless legs   Dermatologic [] Skin changes   Neurologic [] Memory loss/confusion  [] Seizures  [] Trouble walking or imbalance  [] Dizziness  [] Weakness  [x] Numbness/ pins and needles in both legs and feet  [] Tremors  [] Speech Difficulty  [] Headaches  [] Light Sensitivity  [] Sound Sensitivity   Endocrinology []Excessive thirst  []Excessive hunger   Psychiatric [] Anxiety/Depression  [] Hallucination   Allergy/immunology []Hives/environmental allergies   Hematologic/lymph [] Abnormal bleeding  [] Abnormal bruising       Current Outpatient Medications on File Prior to Visit   Medication Sig Dispense Refill    sacubitril-valsartan (ENTRESTO) 24-26 MG per tablet Take 1 tablet by mouth 2 times daily      diclofenac sodium (VOLTAREN) 1 % GEL Apply 4 g topically 4 times daily 100 g 3    Ciclopirox 1 % SHAM 2-3 TIMES WEEKLY TO AXILLA AND GROIN AS NEEDED      TRULICITY 4.87 PT/3.7JD SOPN AS DIRECTED SUBCUTANEOUS WEEKLY 30 DAYS 28      insulin lispro, 1 Unit Dial, 100 UNIT/ML SOPN INJECT UPTO 17 UNITS THREE TIMES DAILY AS PER SLIDING SCALE 30      metoprolol succinate (TOPROL XL) 50 MG extended release tablet TAKE 1 TABLET (50 MG TOTAL) BY MOUTH DAILY.       Cholecalciferol (VITAMIN D3) 125 MCG (5000 UT) TABS TAKE 1 TABLET BY MOUTH DAILY      fluticasone (FLONASE) 50 MCG/ACT nasal spray USE 1 SPRAY IN EACH NOSTRIL ONCE DAILY      hydrALAZINE (APRESOLINE) 50 MG tablet TAKE 1 TABLET BY MOUTH DAILY      LANTUS SOLOSTAR 100 UNIT/ML injection pen INJECT 20 UNITS UNDER THE SKIN TWICE DAILY      ipratropium (ATROVENT) 0.06 % nasal spray USE 2 SPRAYS IN EACH NOSTRIL 4 TIMES DAILY 15 mL 9    carbidopa-levodopa (SINEMET CR)  MG per extended release tablet Take one tab at 8 pm + 11 pm 60 tablet 3    etodolac (LODINE) 400 MG tablet Take 1 tablet by mouth 2 times daily for 14 days 28 tablet 0    tiZANidine (ZANAFLEX) 4 MG tablet TAKE ONE TABLET BY MOUTH ONCE A DAY AT BEDTIME 90 tablet 0    omeprazole (PRILOSEC) 20 MG delayed release capsule TAKE ONE CAPSULE BY MOUTH ONCE A DAY BEFORE BREAKFAST 30 capsule 0    carbidopa-levodopa (SINEMET)  MG per tablet TAKE 1 TABLET BY MOUTH AT 8PM AND 11PM 180 tablet 1    amitriptyline (ELAVIL) 25 MG tablet Take 1.5 tab nightly 135 tablet 1    atorvastatin (LIPITOR) 20 MG tablet TAKE ONE TABLET BY MOUTH ONCE A DAY AT BEDTIME 30 tablet 0    clotrimazole-betamethasone (LOTRISONE) 1-0.05 % cream APPLY TOPICALLY TWICE A DAY 90 g 2    TRUEplus Lancets 30G MISC USE TO TEST BLOOD SUGAR 4 TIMES A  each 0    zinc oxide 20 % ointment MIX WITH KETOCONAZOLE 1:1 AND APPLY TO GROIN CREASES NIGHTLY 60 g 3    ketoconazole (NIZORAL) 2 % cream MIX WITH ZINC OXIDE 1:1 AND APPLY TO GROIN CREASES NIGHTLY 60 g 2    ASPIRIN LOW DOSE 81 MG EC tablet Take 1 tablet by mouth daily      latanoprost (XALATAN) 0.005 % ophthalmic solution Place 1 drop into both eyes daily      BENZOYL PEROXIDE 5 % external wash WASH CHEST AND BACK 1 TO 2 TIMES DAILY 227 g 3    Misc. Devices MISC 1 PAIR OF DIABETIC SHOES (1 LEFT/ 1 RIGHT)  1-3 PAIRS OF INSERTS (LEFT/ RIGHT) 2 each 0    vitamin D (ERGOCALCIFEROL) 1.25 MG (81339 UT) CAPS capsule Take 1 capsule by mouth once a week      GLUCAGON EMERGENCY 1 MG injection       isosorbide mononitrate (IMDUR) 30 MG extended release tablet Take 1 tablet by mouth daily      Alcohol Swabs (B-D SINGLE USE SWABS REGULAR) PADS USE BEFORE TESTING 4 TIMES A DAY PLUS BEFORE USING INSULIN (5 TIMES A DAY) 365 each 2    nystatin (MYCOSTATIN) 402057 UNIT/GM cream Apply topically 2 times daily.  15 g 3    Sulfacetamide Sodium 9.8 % LOTN Apply to nose and cheeks twice daily (Patient taking differently: Apply topically as needed Apply to nose and cheeks twice daily) 113 g 3    Insulin Pen Needle (COMFORT EZ PEN NEEDLES) 32G X 4 MM MISC Use 5 times daily. Change needle each time with Victoza, Basaglar, and Humalog 100 each 11    ammonium lactate (AMLACTIN) 12 % cream APPLY TOPICALLY AS NEEDED. (Patient taking differently: Apply topically as needed Apply topically as needed.) 280 g 2    Lancet Devices (SIMPLE DIAGNOSTICS LANCING DEV) MISC USE TO TEST BLOOD SUGAR 4 TIMES A DAY 1 each 3    PHARMACIST CHOICE ALCOHOL 70 % PADS USE BEFORE TESTING SUGAR (4 TIMES) PLUS BEFORE USING INSULIN (5 TIMES A DAY) 270 each 11    CPAP Machine MISC       VICTOZA 18 MG/3ML SOPN SC injection INJECT 0.6MG ONCE A WEEK FOR TWO WEEKS THEN INJECT 1.2MG ONCE A DAY (Patient not taking: Reported on 4/21/2022)      diclofenac (VOLTAREN) 75 MG EC tablet TAKE 1 TABLET BY MOUTH 2 TIMES DAILY (WITH MEALS) FOR 14 DAYS 28 tablet 0    lisinopril (PRINIVIL;ZESTRIL) 5 MG tablet Take 5 mg by mouth nightly  (Patient not taking: Reported on 4/21/2022)      Semaglutide (OZEMPIC, 1 MG/DOSE, SC) Inject 1 mg into the skin once a week Every Sunday (Patient not taking: Reported on 4/21/2022)       No current facility-administered medications on file prior to visit. Allergies: Litzy Lamar. is allergic to keflex [cephalexin], lisinopril, other, codeine, corticosteroids, doxycycline, ibuprofen, and metformin and related.     Past Medical History:   Diagnosis Date    Bundle branch block, left     [facility]; last visit (virtual) April 2020    Diabetes [facility])     Diabetes mellitus ([facility] 6/9/2017    Diabetic polyneuropathy associated with type 2 diabetes mellitus ([facility] 5/4/2020    Gait difficulty 5/4/2020    GERD (gastroesophageal reflux disease)     History of echocardiogram 2016    [facility]; [facility]    Hyperlipidemia     Hypertension     PCP Juve Cordero NP, seen on 1/5/2020    Kidney calculi     Lumbar radiculopathy 6/9/2017    Mass of right elbow 5/28/2020    Muscle spasms of both lower extremities 5/4/2020    Neuropathic pain of both legs 5/4/2020    Non-ischemic cardiomyopathy (HCC)     Numbness and tingling in both hands 5/29/2019    Obstructive sleep apnea syndrome 06/09/2017    No machine    PONV (postoperative nausea and vomiting)     needs scop patch for OR    Prolonged emergence from general anesthesia     Restless legs syndrome 6/9/2017    Restless legs syndrome (RLS) 6/9/2017    Right median nerve neuropathy 5/28/2020    Severe obesity (BMI 35.0-39. 9) with comorbidity ([address] 7/31/2018    Ulnar neuropathy of right upper extremity 5/28/2020    Wears dentures     Upper & lower       Past Surgical History:   Procedure Laterality Date    ANKLE SURGERY Right     [facility]    CARDIAC CATHETERIZATION  01/2020    \"at a [facility]\"; No blockages    CARPAL TUNNEL RELEASE Right 05/28/2020    CUBITAL, GUYONS CANAL AND CARPAL TUNNEL RELEASE     CARPAL TUNNEL RELEASE Right 5/28/2020    CUBITAL, GUYONS CANAL AND CARPAL TUNNEL RELEASE performed by Lucia Read DO at [facility]    No polyps    GLAUCOMA SURGERY Bilateral 12/2018    HC INJECTION PROCEDURE FOR SACROILIAC JOINT Bilateral 2/21/2020    SACROILIAC JOINT INJECTION performed by [facility]vinder Gannon [facility] at [facility] Right     SINUS SURGERY       Social History: Jessica Diaz.  reports that he quit smoking about 12 years ago. His smoking use included cigarettes. He started smoking about 34 years ago. He has a 60.00 pack-year smoking history. He has never used smokeless tobacco. He reports current alcohol use. He reports previous drug use.     Family History   Problem Relation Age of Onset    High Cholesterol Mother     Depression Mother     Heart Disease Father         stents    High Cholesterol Father     Alcohol Abuse Father     Heart Surgery Father         CABG    Heart Attack Father     Lung Cancer Father primary, transferred to the bones    Dementia Father     Diabetes Maternal Grandmother     Diabetes Maternal Grandfather     No Known Problems Paternal Grandmother     No Known Problems Paternal Grandfather     No Known Problems Half-Sister     No Known Problems Half-Brother     Thyroid Disease Daughter      On exam: Blood pressure 120/78, pulse 86, height 6' 1\" (1.854 m), weight (!) 301 lb (136.5 kg). NEUROLOGIC EXAMINATION  GENERAL  Appears comfortable and in distress with ongoing painful sensations in both legs   HEENT  NC/ AT   NECK  Supple and no bruits heard   MENTAL STATUS:  Alert, oriented, intact memory, no confusion, normal speech, normal language, no hallucination or delusion; appropriate affect   CRANIAL NERVES: II     -      PERRLA, fundi reveal intact venous pulsations;  Visual fields intact to confrontation  III,IV,VI -  EOMs full, no afferent defect, no                      KRISTINE, no ptosis  V     -     Normal facial sensation  VII    -     Normal facial symmetry  VIII   -     Intact hearing  IX,X -     Symmetrical palate  XI    -     Symmetrical shoulder shrug  XII   -     Midline tongue, no atrophy    MOTOR FUNCTION:  significant for good strength of grade 5/5 in bilateral proximal and distal muscle groups of both upper and lower extremities with normal bulk, normal tone and no involuntary movements, no tremor   SENSORY FUNCTION:  Impaired light touch, pinprick and temperature in distal lower extremities bilaterally    CEREBELLAR FUNCTION:  Intact fine motor control over upper limbs   REFLEX FUNCTION:  Symmetric, 1+ DTRs in upper extremities and both patellar reflexes; diminished ankle jerks bilaterally, no Babinski sign   STATION and GAIT  Normal station, wide based gait; difficulty with tandem gait       NCS/EMG of bilateral upper and lower extremities 5/29/18:    Performed by Dr. Umm Yanes:   Abnormal study; electrophysiologic evidence of mild bilateral carpal tunnel syndrome.        EMG of Bilateral UE (4/4/19):   Electrodiagnostic Interpretation:   There is electrophysiologic evidence of ulnar neuropathy at right elbow with demyelinating and axonal features. Supporting features include low ulnar snap, prolonged ulnar cmap with slowing of greater than 10-11 m/s across the elbow segment compared with the forearm segment. This study also demonstrated electrophysiologic evidence of bilateral median neuropathy (bilateral CTS)  of mild-moderate severity.      MRI lumbar spine 10/29/2019: Degenerative disc disease at L5-S1 with disc bulge superimposed central to left paracentral disc protrusion, with narrowing of the left lateral recess, possibly contacting left descending S1 nerve root, with mild bilateral foraminal narrowing. Holter monitor 48 hr (3/16/2020): no a fib; no svt or ventricular ectopy      Lab Results   Component Value Date    TSH 1.33 04/18/2022         Lab Results   Component Value Date    LABA1C 8.8 (H) 04/18/2022           Impression and Plan: Mr. García Rivera. is a 47 y.o. male with   Diabetic neuropathy; inadequately controlled; failed gabapentin, Lyrica, duloxetine. Inability to tolerate Elavil; will start him on Nucynta; extensive and detailed counseling/discussion done about it. Patient was provided with patient information handout on Nucynta medication. He was strongly advised to review the literature before initiating that medication and he clearly understood that it has addiction potential.    ? Amyloid neuropathy; with symmetric distal sensory loss, burning pain and weakness; will get neuro biopsy. Restless leg syndrome sec to above; marginally controlled on present dose of Sinemet 25/100 at 8 PM + 11 PM.    Regarding control of diabetic peripheral polyneuropathy; major goals of care should include reduction of modifiable risk factors such as hyperglycemia, hyperlipidemia, hypertension, obesity and tobacco abuse.   Optimal types of exercise and specific dietary recommendations may improve outcomes. Increasing HDL and decreasing triglycerides would certainly help. Sleep apnea; on nightly cpap    Entrapment neuropathy; right ulnar neuropathy & bilateral CTS; s/p Rt cubital, Guyons canal and carpal tunnel release on 5/28/2020; hand got infected; finally healed; \"things are some better\" \"all the feeling of 4th, 5th digits didn't come back\". Discussed about referral to [facility] or [facility]; he doesn't want any referral.     Follow up in 2 months. This note was partially created using voice recognition software and is inherently subject to errors including those of syntax and \"sound alike\" substitutions which may escape proofreading. In such instances, original meaning may be extrapolated by contextual derivation.

## 2022-05-11 NOTE — TELEPHONE ENCOUNTER
I called [facility]. The codes of 24817 or 43033 do not need PA if done in outpt. . setting and [employer] was listed as in network. I called and lm that we can switch his appt to a skin punch biopsy on 6/23/22 if he would like.

## 2022-05-19 NOTE — TELEPHONE ENCOUNTER
I called and lm for David Yen. The appt on 6/23 is with a different physician.  I asked him to call me and we can try to find a spot for the skin punch biopsy

## 2023-04-26 ENCOUNTER — HOSPITAL ENCOUNTER (OUTPATIENT)
Age: 53
Discharge: HOME | End: 2023-04-26
Payer: MEDICARE

## 2023-04-26 LAB — CORTISOL: 1.3 UG/DL (ref 2.7–18.4)

## 2023-04-26 PROCEDURE — 82533 TOTAL CORTISOL: CPT

## 2023-04-26 PROCEDURE — 80299 QUANTITATIVE ASSAY DRUG: CPT

## 2023-05-01 LAB — DEXAMETHASONE: 185.2 NG/DL

## 2024-04-21 ENCOUNTER — APPOINTMENT (OUTPATIENT)
Dept: CT IMAGING | Age: 54
End: 2024-04-21
Payer: MEDICARE

## 2024-04-21 ENCOUNTER — HOSPITAL ENCOUNTER (OUTPATIENT)
Age: 54
Setting detail: OBSERVATION
Discharge: HOME | End: 2024-04-22
Attending: EMERGENCY MEDICINE | Admitting: EMERGENCY MEDICINE
Payer: MEDICARE

## 2024-04-21 ENCOUNTER — APPOINTMENT (OUTPATIENT)
Dept: GENERAL RADIOLOGY | Age: 54
End: 2024-04-21
Payer: MEDICARE

## 2024-04-21 DIAGNOSIS — R07.9 CHEST PAIN, UNSPECIFIED TYPE: Primary | ICD-10-CM

## 2024-04-21 DIAGNOSIS — N28.89 RENAL MASS: ICD-10-CM

## 2024-04-21 DIAGNOSIS — I25.9 CHEST PAIN DUE TO MYOCARDIAL ISCHEMIA, UNSPECIFIED ISCHEMIC CHEST PAIN TYPE: ICD-10-CM

## 2024-04-21 LAB
ALBUMIN SERPL-MCNC: 4.6 G/DL (ref 3.5–5.2)
ALBUMIN/GLOB SERPL: 2 {RATIO} (ref 1–2.5)
ALP SERPL-CCNC: 90 U/L (ref 40–129)
ALT SERPL-CCNC: 13 U/L (ref 10–50)
ANION GAP SERPL CALCULATED.3IONS-SCNC: 13 MMOL/L (ref 9–16)
AST SERPL-CCNC: 36 U/L (ref 10–50)
BASOPHILS # BLD: 0.08 K/UL (ref 0–0.2)
BASOPHILS NFR BLD: 1 % (ref 0–2)
BILIRUB SERPL-MCNC: 0.5 MG/DL (ref 0–1.2)
BNP SERPL-MCNC: 358 PG/ML (ref 0–300)
BUN SERPL-MCNC: 18 MG/DL (ref 6–20)
CALCIUM SERPL-MCNC: 9.5 MG/DL (ref 8.6–10.4)
CHLORIDE SERPL-SCNC: 101 MMOL/L (ref 98–107)
CO2 SERPL-SCNC: 21 MMOL/L (ref 20–31)
CREAT SERPL-MCNC: 0.8 MG/DL (ref 0.7–1.2)
EOSINOPHIL # BLD: 0.06 K/UL (ref 0–0.44)
EOSINOPHILS RELATIVE PERCENT: 1 % (ref 1–4)
ERYTHROCYTE [DISTWIDTH] IN BLOOD BY AUTOMATED COUNT: 12.6 % (ref 11.8–14.4)
GFR SERPL CREATININE-BSD FRML MDRD: >90 ML/MIN/1.73M2
GLUCOSE BLD-MCNC: 122 MG/DL (ref 75–110)
GLUCOSE BLD-MCNC: 129 MG/DL (ref 75–110)
GLUCOSE SERPL-MCNC: 137 MG/DL (ref 74–99)
HCT VFR BLD AUTO: 47.8 % (ref 40.7–50.3)
HGB BLD-MCNC: 16.5 G/DL (ref 13–17)
IMM GRANULOCYTES # BLD AUTO: <0.03 K/UL (ref 0–0.3)
IMM GRANULOCYTES NFR BLD: 0 %
LYMPHOCYTES NFR BLD: 1.22 K/UL (ref 1.1–3.7)
LYMPHOCYTES RELATIVE PERCENT: 16 % (ref 24–43)
MAGNESIUM SERPL-MCNC: 2.1 MG/DL (ref 1.6–2.6)
MCH RBC QN AUTO: 29.6 PG (ref 25.2–33.5)
MCHC RBC AUTO-ENTMCNC: 34.5 G/DL (ref 28.4–34.8)
MCV RBC AUTO: 85.7 FL (ref 82.6–102.9)
MONOCYTES NFR BLD: 0.63 K/UL (ref 0.1–1.2)
MONOCYTES NFR BLD: 8 % (ref 3–12)
NEUTROPHILS NFR BLD: 74 % (ref 36–65)
NEUTS SEG NFR BLD: 5.77 K/UL (ref 1.5–8.1)
NRBC BLD-RTO: 0 PER 100 WBC
PLATELET # BLD AUTO: 253 K/UL (ref 138–453)
PMV BLD AUTO: 10.5 FL (ref 8.1–13.5)
POTASSIUM SERPL-SCNC: 3.7 MMOL/L (ref 3.7–5.3)
PROT SERPL-MCNC: 7.4 G/DL (ref 6.6–8.7)
RBC # BLD AUTO: 5.58 M/UL (ref 4.21–5.77)
SODIUM SERPL-SCNC: 135 MMOL/L (ref 136–145)
TROPONIN I SERPL HS-MCNC: 16 NG/L (ref 0–22)
TROPONIN I SERPL HS-MCNC: 19 NG/L (ref 0–22)
WBC OTHER # BLD: 7.8 K/UL (ref 3.5–11.3)

## 2024-04-21 PROCEDURE — 83735 ASSAY OF MAGNESIUM: CPT

## 2024-04-21 PROCEDURE — 71045 X-RAY EXAM CHEST 1 VIEW: CPT

## 2024-04-21 PROCEDURE — 93005 ELECTROCARDIOGRAM TRACING: CPT | Performed by: EMERGENCY MEDICINE

## 2024-04-21 PROCEDURE — 82947 ASSAY GLUCOSE BLOOD QUANT: CPT

## 2024-04-21 PROCEDURE — G0378 HOSPITAL OBSERVATION PER HR: HCPCS

## 2024-04-21 PROCEDURE — 2580000003 HC RX 258

## 2024-04-21 PROCEDURE — 6370000000 HC RX 637 (ALT 250 FOR IP)

## 2024-04-21 PROCEDURE — 96372 THER/PROPH/DIAG INJ SC/IM: CPT

## 2024-04-21 PROCEDURE — 84484 ASSAY OF TROPONIN QUANT: CPT

## 2024-04-21 PROCEDURE — 96374 THER/PROPH/DIAG INJ IV PUSH: CPT

## 2024-04-21 PROCEDURE — 99285 EMERGENCY DEPT VISIT HI MDM: CPT

## 2024-04-21 PROCEDURE — 6360000002 HC RX W HCPCS

## 2024-04-21 PROCEDURE — 85025 COMPLETE CBC W/AUTO DIFF WBC: CPT

## 2024-04-21 PROCEDURE — 83880 ASSAY OF NATRIURETIC PEPTIDE: CPT

## 2024-04-21 PROCEDURE — 71275 CT ANGIOGRAPHY CHEST: CPT

## 2024-04-21 PROCEDURE — 96375 TX/PRO/DX INJ NEW DRUG ADDON: CPT

## 2024-04-21 PROCEDURE — 80053 COMPREHEN METABOLIC PANEL: CPT

## 2024-04-21 PROCEDURE — 74174 CTA ABD&PLVS W/CONTRAST: CPT

## 2024-04-21 PROCEDURE — 6360000004 HC RX CONTRAST MEDICATION

## 2024-04-21 RX ORDER — NITROGLYCERIN 0.4 MG/1
0.4 TABLET SUBLINGUAL ONCE
Status: COMPLETED | OUTPATIENT
Start: 2024-04-21 | End: 2024-04-21

## 2024-04-21 RX ORDER — ACETAMINOPHEN 650 MG/1
650 SUPPOSITORY RECTAL EVERY 6 HOURS PRN
Status: DISCONTINUED | OUTPATIENT
Start: 2024-04-21 | End: 2024-04-22 | Stop reason: HOSPADM

## 2024-04-21 RX ORDER — FLUTICASONE PROPIONATE 50 MCG
1 SPRAY, SUSPENSION (ML) NASAL DAILY
Status: DISCONTINUED | OUTPATIENT
Start: 2024-04-21 | End: 2024-04-22 | Stop reason: HOSPADM

## 2024-04-21 RX ORDER — MORPHINE SULFATE 4 MG/ML
4 INJECTION, SOLUTION INTRAMUSCULAR; INTRAVENOUS ONCE
Status: COMPLETED | OUTPATIENT
Start: 2024-04-21 | End: 2024-04-21

## 2024-04-21 RX ORDER — LIDOCAINE 4 G/G
1 PATCH TOPICAL ONCE
Status: COMPLETED | OUTPATIENT
Start: 2024-04-21 | End: 2024-04-22

## 2024-04-21 RX ORDER — OXYCODONE HYDROCHLORIDE 5 MG/1
5 TABLET ORAL ONCE
Status: COMPLETED | OUTPATIENT
Start: 2024-04-21 | End: 2024-04-21

## 2024-04-21 RX ORDER — ENOXAPARIN SODIUM 100 MG/ML
40 INJECTION SUBCUTANEOUS DAILY
Status: DISCONTINUED | OUTPATIENT
Start: 2024-04-21 | End: 2024-04-22 | Stop reason: HOSPADM

## 2024-04-21 RX ORDER — POTASSIUM CHLORIDE 20 MEQ/1
40 TABLET, EXTENDED RELEASE ORAL PRN
Status: DISCONTINUED | OUTPATIENT
Start: 2024-04-21 | End: 2024-04-22 | Stop reason: HOSPADM

## 2024-04-21 RX ORDER — LATANOPROST 50 UG/ML
1 SOLUTION/ DROPS OPHTHALMIC DAILY
Status: DISCONTINUED | OUTPATIENT
Start: 2024-04-21 | End: 2024-04-22 | Stop reason: HOSPADM

## 2024-04-21 RX ORDER — ATORVASTATIN CALCIUM 20 MG/1
20 TABLET, FILM COATED ORAL NIGHTLY
Status: DISCONTINUED | OUTPATIENT
Start: 2024-04-21 | End: 2024-04-22 | Stop reason: HOSPADM

## 2024-04-21 RX ORDER — PANTOPRAZOLE SODIUM 40 MG/1
40 TABLET, DELAYED RELEASE ORAL
Status: DISCONTINUED | OUTPATIENT
Start: 2024-04-22 | End: 2024-04-22 | Stop reason: HOSPADM

## 2024-04-21 RX ORDER — ASPIRIN 81 MG/1
243 TABLET, CHEWABLE ORAL ONCE
Status: COMPLETED | OUTPATIENT
Start: 2024-04-21 | End: 2024-04-21

## 2024-04-21 RX ORDER — ACETAMINOPHEN 325 MG/1
650 TABLET ORAL EVERY 6 HOURS PRN
Status: DISCONTINUED | OUTPATIENT
Start: 2024-04-21 | End: 2024-04-22 | Stop reason: HOSPADM

## 2024-04-21 RX ORDER — INSULIN LISPRO 100 [IU]/ML
17 INJECTION, SOLUTION INTRAVENOUS; SUBCUTANEOUS
Status: DISCONTINUED | OUTPATIENT
Start: 2024-04-21 | End: 2024-04-22 | Stop reason: HOSPADM

## 2024-04-21 RX ORDER — ASPIRIN 81 MG/1
81 TABLET ORAL DAILY
Status: DISCONTINUED | OUTPATIENT
Start: 2024-04-21 | End: 2024-04-22 | Stop reason: HOSPADM

## 2024-04-21 RX ORDER — ONDANSETRON 4 MG/1
4 TABLET, ORALLY DISINTEGRATING ORAL EVERY 8 HOURS PRN
Status: DISCONTINUED | OUTPATIENT
Start: 2024-04-21 | End: 2024-04-22 | Stop reason: HOSPADM

## 2024-04-21 RX ORDER — POLYETHYLENE GLYCOL 3350 17 G/17G
17 POWDER, FOR SOLUTION ORAL DAILY PRN
Status: DISCONTINUED | OUTPATIENT
Start: 2024-04-21 | End: 2024-04-22 | Stop reason: HOSPADM

## 2024-04-21 RX ORDER — NITROGLYCERIN 0.4 MG/1
0.4 TABLET SUBLINGUAL EVERY 5 MIN PRN
Status: DISCONTINUED | OUTPATIENT
Start: 2024-04-21 | End: 2024-04-22 | Stop reason: HOSPADM

## 2024-04-21 RX ORDER — HYDRALAZINE HYDROCHLORIDE 50 MG/1
50 TABLET, FILM COATED ORAL EVERY 8 HOURS SCHEDULED
Status: DISCONTINUED | OUTPATIENT
Start: 2024-04-21 | End: 2024-04-22 | Stop reason: HOSPADM

## 2024-04-21 RX ORDER — OXYCODONE HYDROCHLORIDE 5 MG/1
5 TABLET ORAL EVERY 4 HOURS PRN
Status: DISCONTINUED | OUTPATIENT
Start: 2024-04-21 | End: 2024-04-22 | Stop reason: HOSPADM

## 2024-04-21 RX ORDER — IPRATROPIUM BROMIDE 42 UG/1
2 SPRAY, METERED NASAL 4 TIMES DAILY
Status: DISCONTINUED | OUTPATIENT
Start: 2024-04-21 | End: 2024-04-22 | Stop reason: HOSPADM

## 2024-04-21 RX ORDER — SODIUM CHLORIDE 0.9 % (FLUSH) 0.9 %
5-40 SYRINGE (ML) INJECTION EVERY 12 HOURS SCHEDULED
Status: DISCONTINUED | OUTPATIENT
Start: 2024-04-21 | End: 2024-04-22 | Stop reason: HOSPADM

## 2024-04-21 RX ORDER — ORPHENADRINE CITRATE 30 MG/ML
60 INJECTION INTRAMUSCULAR; INTRAVENOUS ONCE
Status: COMPLETED | OUTPATIENT
Start: 2024-04-21 | End: 2024-04-21

## 2024-04-21 RX ORDER — VITAMIN B COMPLEX
5000 TABLET ORAL DAILY
Status: DISCONTINUED | OUTPATIENT
Start: 2024-04-21 | End: 2024-04-22 | Stop reason: HOSPADM

## 2024-04-21 RX ORDER — POTASSIUM CHLORIDE 7.45 MG/ML
10 INJECTION INTRAVENOUS PRN
Status: DISCONTINUED | OUTPATIENT
Start: 2024-04-21 | End: 2024-04-22 | Stop reason: HOSPADM

## 2024-04-21 RX ORDER — AMITRIPTYLINE HYDROCHLORIDE 25 MG/1
25 TABLET, FILM COATED ORAL NIGHTLY
Status: DISCONTINUED | OUTPATIENT
Start: 2024-04-21 | End: 2024-04-22 | Stop reason: HOSPADM

## 2024-04-21 RX ORDER — TIZANIDINE 4 MG/1
4 TABLET ORAL NIGHTLY
Status: DISCONTINUED | OUTPATIENT
Start: 2024-04-21 | End: 2024-04-22 | Stop reason: HOSPADM

## 2024-04-21 RX ORDER — CARBIDOPA AND LEVODOPA 25; 100 MG/1; MG/1
1 TABLET, EXTENDED RELEASE ORAL NIGHTLY
Status: DISCONTINUED | OUTPATIENT
Start: 2024-04-21 | End: 2024-04-22 | Stop reason: HOSPADM

## 2024-04-21 RX ORDER — SODIUM CHLORIDE 0.9 % (FLUSH) 0.9 %
5-40 SYRINGE (ML) INJECTION PRN
Status: DISCONTINUED | OUTPATIENT
Start: 2024-04-21 | End: 2024-04-22 | Stop reason: HOSPADM

## 2024-04-21 RX ORDER — SODIUM CHLORIDE 9 MG/ML
INJECTION, SOLUTION INTRAVENOUS PRN
Status: DISCONTINUED | OUTPATIENT
Start: 2024-04-21 | End: 2024-04-22 | Stop reason: HOSPADM

## 2024-04-21 RX ORDER — METOPROLOL SUCCINATE 25 MG/1
50 TABLET, EXTENDED RELEASE ORAL DAILY
Status: DISCONTINUED | OUTPATIENT
Start: 2024-04-21 | End: 2024-04-22 | Stop reason: HOSPADM

## 2024-04-21 RX ORDER — ONDANSETRON 2 MG/ML
INJECTION INTRAMUSCULAR; INTRAVENOUS
Status: COMPLETED
Start: 2024-04-21 | End: 2024-04-21

## 2024-04-21 RX ORDER — MAGNESIUM SULFATE IN WATER 40 MG/ML
2000 INJECTION, SOLUTION INTRAVENOUS PRN
Status: DISCONTINUED | OUTPATIENT
Start: 2024-04-21 | End: 2024-04-22 | Stop reason: HOSPADM

## 2024-04-21 RX ORDER — ONDANSETRON 2 MG/ML
4 INJECTION INTRAMUSCULAR; INTRAVENOUS EVERY 6 HOURS PRN
Status: DISCONTINUED | OUTPATIENT
Start: 2024-04-21 | End: 2024-04-22 | Stop reason: HOSPADM

## 2024-04-21 RX ORDER — ONDANSETRON 2 MG/ML
4 INJECTION INTRAMUSCULAR; INTRAVENOUS ONCE
Status: COMPLETED | OUTPATIENT
Start: 2024-04-21 | End: 2024-04-21

## 2024-04-21 RX ORDER — INSULIN GLARGINE 100 [IU]/ML
20 INJECTION, SOLUTION SUBCUTANEOUS 2 TIMES DAILY
Status: DISCONTINUED | OUTPATIENT
Start: 2024-04-21 | End: 2024-04-22 | Stop reason: HOSPADM

## 2024-04-21 RX ADMIN — OXYCODONE 5 MG: 5 TABLET ORAL at 16:42

## 2024-04-21 RX ADMIN — MORPHINE SULFATE 4 MG: 4 INJECTION INTRAVENOUS at 14:12

## 2024-04-21 RX ADMIN — FLUTICASONE PROPIONATE 1 SPRAY: 50 SPRAY, METERED NASAL at 18:40

## 2024-04-21 RX ADMIN — ASPIRIN 81 MG 243 MG: 81 TABLET ORAL at 14:53

## 2024-04-21 RX ADMIN — ATORVASTATIN CALCIUM 20 MG: 20 TABLET, FILM COATED ORAL at 22:06

## 2024-04-21 RX ADMIN — NITROGLYCERIN 0.4 MG: 0.4 TABLET SUBLINGUAL at 15:23

## 2024-04-21 RX ADMIN — CHOLECALCIFEROL TAB 25 MCG (1000 UNIT) 5000 UNITS: 25 TAB at 18:37

## 2024-04-21 RX ADMIN — AMITRIPTYLINE HYDROCHLORIDE 25 MG: 25 TABLET, FILM COATED ORAL at 22:06

## 2024-04-21 RX ADMIN — ONDANSETRON 4 MG: 2 INJECTION INTRAMUSCULAR; INTRAVENOUS at 14:12

## 2024-04-21 RX ADMIN — ENOXAPARIN SODIUM 40 MG: 100 INJECTION SUBCUTANEOUS at 18:38

## 2024-04-21 RX ADMIN — ASPIRIN 81 MG: 81 TABLET, COATED ORAL at 18:49

## 2024-04-21 RX ADMIN — METOPROLOL SUCCINATE 50 MG: 25 TABLET, EXTENDED RELEASE ORAL at 18:50

## 2024-04-21 RX ADMIN — IOPAMIDOL 100 ML: 755 INJECTION, SOLUTION INTRAVENOUS at 14:24

## 2024-04-21 RX ADMIN — SACUBITRIL AND VALSARTAN 1 TABLET: 24; 26 TABLET, FILM COATED ORAL at 22:07

## 2024-04-21 RX ADMIN — CARBIDOPA AND LEVODOPA 1 TABLET: 25; 100 TABLET ORAL at 22:06

## 2024-04-21 RX ADMIN — ORPHENADRINE CITRATE 60 MG: 30 INJECTION, SOLUTION INTRAMUSCULAR; INTRAVENOUS at 18:51

## 2024-04-21 RX ADMIN — ACETAMINOPHEN 650 MG: 325 TABLET ORAL at 22:08

## 2024-04-21 RX ADMIN — NITROGLYCERIN 0.4 MG: 0.4 TABLET SUBLINGUAL at 22:16

## 2024-04-21 RX ADMIN — OXYCODONE 5 MG: 5 TABLET ORAL at 18:33

## 2024-04-21 RX ADMIN — HYDRALAZINE HYDROCHLORIDE 50 MG: 50 TABLET, FILM COATED ORAL at 22:05

## 2024-04-21 RX ADMIN — CARBIDOPA AND LEVODOPA 1 TABLET: 25; 100 TABLET, EXTENDED RELEASE ORAL at 22:06

## 2024-04-21 RX ADMIN — TIZANIDINE 4 MG: 4 TABLET ORAL at 22:08

## 2024-04-21 RX ADMIN — SODIUM CHLORIDE, PRESERVATIVE FREE 10 ML: 5 INJECTION INTRAVENOUS at 19:36

## 2024-04-21 RX ADMIN — OXYCODONE 5 MG: 5 TABLET ORAL at 22:09

## 2024-04-21 ASSESSMENT — PAIN DESCRIPTION - LOCATION
LOCATION: CHEST;SHOULDER;NECK
LOCATION: CHEST;ARM
LOCATION: NECK;CHEST;SHOULDER
LOCATION: CHEST;NECK
LOCATION: CHEST;NECK;SHOULDER
LOCATION: CHEST;NECK;SHOULDER

## 2024-04-21 ASSESSMENT — PAIN DESCRIPTION - ONSET: ONSET: SUDDEN

## 2024-04-21 ASSESSMENT — PAIN SCALES - GENERAL
PAINLEVEL_OUTOF10: 8
PAINLEVEL_OUTOF10: 5
PAINLEVEL_OUTOF10: 7
PAINLEVEL_OUTOF10: 7
PAINLEVEL_OUTOF10: 0
PAINLEVEL_OUTOF10: 10
PAINLEVEL_OUTOF10: 10
PAINLEVEL_OUTOF10: 5
PAINLEVEL_OUTOF10: 10
PAINLEVEL_OUTOF10: 8
PAINLEVEL_OUTOF10: 5

## 2024-04-21 ASSESSMENT — PAIN - FUNCTIONAL ASSESSMENT
PAIN_FUNCTIONAL_ASSESSMENT: 0-10
PAIN_FUNCTIONAL_ASSESSMENT: ACTIVITIES ARE NOT PREVENTED

## 2024-04-21 ASSESSMENT — PAIN DESCRIPTION - ORIENTATION
ORIENTATION: LEFT
ORIENTATION: LEFT;MID
ORIENTATION: LEFT;MID

## 2024-04-21 ASSESSMENT — PAIN SCALES - WONG BAKER
WONGBAKER_NUMERICALRESPONSE: HURTS WHOLE LOT
WONGBAKER_NUMERICALRESPONSE: NO HURT
WONGBAKER_NUMERICALRESPONSE: NO HURT

## 2024-04-21 ASSESSMENT — PAIN DESCRIPTION - PAIN TYPE: TYPE: ACUTE PAIN

## 2024-04-21 ASSESSMENT — PAIN DESCRIPTION - DESCRIPTORS
DESCRIPTORS: ACHING;DISCOMFORT
DESCRIPTORS: ACHING
DESCRIPTORS: ACHING;DISCOMFORT
DESCRIPTORS: ACHING;PRESSURE
DESCRIPTORS: TIGHTNESS
DESCRIPTORS: ACHING

## 2024-04-21 ASSESSMENT — PAIN DESCRIPTION - FREQUENCY: FREQUENCY: INTERMITTENT

## 2024-04-21 ASSESSMENT — HEART SCORE: ECG: NON-SPECIFC REPOLARIZATION DISTURBANCE/LBTB/PM

## 2024-04-21 NOTE — ED PROVIDER NOTES
[facility] ED     Emergency Department     Faculty Attestation        I performed a history and physical examination of the patient and discussed management with the resident. I reviewed the resident’s note and agree with the documented findings and plan of care. Any areas of disagreement are noted on the chart. I was personally present for the key portions of any procedures. I have documented in the chart those procedures where I was not present during the key portions. I have reviewed the emergency nurses triage note. I agree with the chief complaint, past medical history, past surgical history, allergies, medications, social and family history as documented unless otherwise noted below.  For Physician Assistant/ Nurse Practitioner cases/documentation I have personally evaluated this patient and have completed at least one if not all key elements of the E/M (history, physical exam, and MDM). Additional findings are as noted.      Vital Signs: BP: (!) 173/104  Pulse: 84  Respirations: 18  Temp: 97.6 °F (36.4 °C) SpO2: 94 %  PCP:  Rl Mccarthy MD  Note Started: 4/21/24, 1:35 PM EDT    Pertinent Comments:     Patient is a 54-year-old male with history of hypertension diabetes as well as nonischemic cardiomyopathy with last catheterization 4 years ago negative for coronary artery disease but had EF of 40%.   Over the last day or so patient has had off-and-on chest pain that is considered left-sided up into his neck as well as posterior shoulder and back that is occasionally feeling \"ripping\".   At times symptoms are completely gone however.   Associated with some mild shortness of breath and did have some diaphoresis earlier now resolved.    Some nausea but no vomiting.    Denies any other recent illnesses such as viral syndrome symptoms or nasal congestion/cough.      Physical Examination:  Clear to auscultation bilaterally, regular rate and rhythm, and

## 2024-04-21 NOTE — ED NOTES
Dr. Banerjee at bedside to speak with patient.   
chloride 10 mEq/100 mL IVPB (Peripheral Line)    magnesium sulfate 2000 mg in 50 mL IVPB premix    enoxaparin (LOVENOX) injection 40 mg     Order Specific Question:   Indication of Use     Answer:   Prophylaxis-DVT/PE    OR Linked Order Group     ondansetron (ZOFRAN-ODT) disintegrating tablet 4 mg     ondansetron (ZOFRAN) injection 4 mg    polyethylene glycol (GLYCOLAX) packet 17 g    OR Linked Order Group     acetaminophen (TYLENOL) tablet 650 mg     acetaminophen (TYLENOL) suppository 650 mg       SURGICAL HISTORY       Past Surgical History:   Procedure Laterality Date    ANKLE SURGERY Right     [facility]    CARDIAC CATHETERIZATION  01/2020    \"at a [facility]\";  No blockages    CARPAL TUNNEL RELEASE Right 05/28/2020    CUBITAL, GUYONS CANAL AND CARPAL TUNNEL RELEASE     CARPAL TUNNEL RELEASE Right 5/28/2020    CUBITAL, GUYONS CANAL AND CARPAL TUNNEL RELEASE performed by Dirk Parson DO at [facility] OR    COLONOSCOPY  1990    No polyps    GLAUCOMA SURGERY Bilateral 12/2018    HC INJECTION PROCEDURE FOR SACROILIAC JOINT Bilateral 2/21/2020    SACROILIAC JOINT INJECTION performed by Sylvester Yu MD at [facility] OR    SHOULDER SURGERY Right     SINUS SURGERY         PAST MEDICAL HISTORY       Past Medical History:   Diagnosis Date    Bundle branch block, left     [facility]; last visit (virtual) April 2020    Diabetes (HCC)     Diabetes mellitus (HCC) 6/9/2017    Diabetic polyneuropathy associated with type 2 diabetes mellitus (HCC) 5/4/2020    Gait difficulty 5/4/2020    GERD (gastroesophageal reflux disease)     History of echocardiogram 2016    [facility]; [facility]    Hyperlipidemia     Hypertension     PCP COLE Ponce NP, seen on 1/5/2020    Kidney calculi     Lumbar radiculopathy 6/9/2017    Mass of right elbow 5/28/2020    Muscle spasms of both lower extremities 5/4/2020    Neuropathic pain of both legs 5/4/2020    Non-ischemic cardiomyopathy (HCC)     Numbness and tingling in both

## 2024-04-21 NOTE — ED TRIAGE NOTES
Pt presents to ED rm 20 via EMS with c/o chest pain. Pt states the chest pain started last night into this morning and decided to call EMS. Pt has a hx of a cardiac cath at [facility]. Pt states the chest pain radiates to his left shoulder and neck. Pain rated a 10/10. Pt also has a hx of kidney stone and is a diabetic. Pt attached to full monitor. Resp even and non labored. Pt is resting in bed with personal items and call light within reach. Will continue with plan of care.

## 2024-04-21 NOTE — ED PROVIDER NOTES
[facility]  FACULTY HANDOFF       Handoff taken on the following patient from prior Attending Physician:  Pt Name: Davidson Christiansen Jr.  PCP:  Rl Mccarthy MD    Attestation  I was available and discussed any additional care issues that arose and coordinated the management plans with the resident(s) caring for the patient during my duty period. Any areas of disagreement with resident's documentation of care or procedures are noted on the chart. I was personally present for the key portions of any/all procedures during my duty period. I have documented in the chart those procedures where I was not present during the key portions.           Daniel Trent MD  04/21/24 [id number]

## 2024-04-21 NOTE — ED PROVIDER NOTES
[facility] OBSERVATION UNIT  Emergency Department Encounter  Emergency Medicine Resident     Pt Name:Davidson Christiansen Jr.  MRN: [id number]  Birthdate 1970  Date of evaluation: 4/21/24  PCP:  Rl Mccarthy MD  Note Started: 1:32 PM EDT      CHIEF COMPLAINT       Chief Complaint   Patient presents with    Chest Pain       HISTORY OF PRESENT ILLNESS  (Location/Symptom, Timing/Onset, Context/Setting, Quality, Duration, Modifying Factors, Severity.)      Davidson Christiansen Jr. is a 54 y.o. male history of diabetes, hypertension, hyperlipidemia who presents with left-sided chest pain that began yesterday evening.  Patient states he was seated when he began having a severe burning, ripping pain in his left shoulder.  Radiates up into his neck and into his back.  States he went to be evaluated at emergency department last night however the wait was too long so he returned home.  Patient now continues to have pain in his left chest with nausea and feels diaphoretic.  Denies recent illness including fevers, chills.    Patient's last cardiac catheterization in 2020 showing minimal coronary artery disease.  Had an EF of 56% at that time.    PAST MEDICAL / SURGICAL / SOCIAL / FAMILY HISTORY      has a past medical history of Bundle branch block, left, Diabetes (HCC), Diabetes mellitus (HCC), Diabetic polyneuropathy associated with type 2 diabetes mellitus (HCC), Gait difficulty, GERD (gastroesophageal reflux disease), History of echocardiogram, Hyperlipidemia, Hypertension, Kidney calculi, Lumbar radiculopathy, Mass of right elbow, Muscle spasms of both lower extremities, Neuropathic pain of both legs, Non-ischemic cardiomyopathy (HCC), Numbness and tingling in both hands, Obstructive sleep apnea syndrome, PONV (postoperative nausea and vomiting), Prolonged emergence from general anesthesia, Restless legs syndrome, Restless legs syndrome (RLS), Right median nerve neuropathy, Severe obesity (BMI 35.0-39.9) with comorbidity (HCC), Ulnar

## 2024-04-22 ENCOUNTER — APPOINTMENT (OUTPATIENT)
Age: 54
End: 2024-04-22
Attending: EMERGENCY MEDICINE
Payer: MEDICARE

## 2024-04-22 ENCOUNTER — APPOINTMENT (OUTPATIENT)
Dept: CT IMAGING | Age: 54
End: 2024-04-22
Payer: MEDICARE

## 2024-04-22 VITALS
WEIGHT: 221 LBS | HEIGHT: 73 IN | HEART RATE: 76 BPM | OXYGEN SATURATION: 98 % | BODY MASS INDEX: 29.29 KG/M2 | TEMPERATURE: 97 F | DIASTOLIC BLOOD PRESSURE: 74 MMHG | RESPIRATION RATE: 16 BRPM | SYSTOLIC BLOOD PRESSURE: 138 MMHG

## 2024-04-22 LAB
ECHO AO ROOT DIAM: 3.7 CM
ECHO AO ROOT INDEX: 1.65 CM/M2
ECHO AV AREA PEAK VELOCITY: 3.5 CM2
ECHO AV AREA VTI: 3.3 CM2
ECHO AV AREA/BSA PEAK VELOCITY: 1.6 CM2/M2
ECHO AV AREA/BSA VTI: 1.5 CM2/M2
ECHO AV MEAN GRADIENT: 5 MMHG
ECHO AV MEAN VELOCITY: 1 M/S
ECHO AV PEAK GRADIENT: 9 MMHG
ECHO AV PEAK VELOCITY: 1.5 M/S
ECHO AV VELOCITY RATIO: 0.87
ECHO AV VTI: 32 CM
ECHO BSA: 2.27 M2
ECHO EST RA PRESSURE: 10 MMHG
ECHO IVC EXP: 2.3 CM
ECHO LA AREA 2C: 25.2 CM2
ECHO LA AREA 4C: 16.8 CM2
ECHO LA DIAMETER INDEX: 2.05 CM/M2
ECHO LA DIAMETER: 4.6 CM
ECHO LA MAJOR AXIS: 4.7 CM
ECHO LA MINOR AXIS: 5.5 CM
ECHO LA TO AORTIC ROOT RATIO: 1.24
ECHO LA VOL BP: 73 ML (ref 18–58)
ECHO LA VOL MOD A2C: 96 ML (ref 18–58)
ECHO LA VOL MOD A4C: 47 ML (ref 18–58)
ECHO LA VOL/BSA BIPLANE: 33 ML/M2 (ref 16–34)
ECHO LA VOLUME INDEX MOD A2C: 43 ML/M2 (ref 16–34)
ECHO LA VOLUME INDEX MOD A4C: 21 ML/M2 (ref 16–34)
ECHO LV E' LATERAL VELOCITY: 9 CM/S
ECHO LV E' SEPTAL VELOCITY: 4 CM/S
ECHO LV EDV A2C: 141 ML
ECHO LV EDV A4C: 138 ML
ECHO LV EDV INDEX A4C: 62 ML/M2
ECHO LV EDV NDEX A2C: 63 ML/M2
ECHO LV EJECTION FRACTION A2C: 48 %
ECHO LV EJECTION FRACTION A4C: 49 %
ECHO LV EJECTION FRACTION BIPLANE: 47 % (ref 55–100)
ECHO LV ESV A2C: 73 ML
ECHO LV ESV A4C: 70 ML
ECHO LV ESV INDEX A2C: 33 ML/M2
ECHO LV ESV INDEX A4C: 31 ML/M2
ECHO LV INTERNAL DIMENSION DIASTOLE INDEX: 2.59 CM/M2
ECHO LV INTERNAL DIMENSION DIASTOLIC: 5.8 CM (ref 4.2–5.9)
ECHO LV IVSD: 1.1 CM (ref 0.6–1)
ECHO LV MASS 2D: 297 G (ref 88–224)
ECHO LV MASS INDEX 2D: 132.6 G/M2 (ref 49–115)
ECHO LV POSTERIOR WALL DIASTOLIC: 1.3 CM (ref 0.6–1)
ECHO LV RELATIVE WALL THICKNESS RATIO: 0.45
ECHO LVOT AREA: 4.2 CM2
ECHO LVOT AV VTI INDEX: 0.79
ECHO LVOT DIAM: 2.3 CM
ECHO LVOT MEAN GRADIENT: 3 MMHG
ECHO LVOT PEAK GRADIENT: 6 MMHG
ECHO LVOT PEAK VELOCITY: 1.3 M/S
ECHO LVOT STROKE VOLUME INDEX: 47.1 ML/M2
ECHO LVOT SV: 105.5 ML
ECHO LVOT VTI: 25.4 CM
ECHO MV A VELOCITY: 0.98 M/S
ECHO MV AREA VTI: 3.4 CM2
ECHO MV E DECELERATION TIME (DT): 244 MS
ECHO MV E VELOCITY: 0.93 M/S
ECHO MV E/A RATIO: 0.95
ECHO MV E/E' LATERAL: 10.33
ECHO MV E/E' RATIO (AVERAGED): 16.79
ECHO MV LVOT VTI INDEX: 1.2
ECHO MV MAX VELOCITY: 0.9 M/S
ECHO MV MEAN GRADIENT: 1 MMHG
ECHO MV MEAN VELOCITY: 0.4 M/S
ECHO MV PEAK GRADIENT: 3 MMHG
ECHO MV VTI: 30.6 CM
ECHO PV MAX VELOCITY: 1.2 M/S
ECHO PV PEAK GRADIENT: 6 MMHG
ECHO RA MAJOR AXIS INDEX: 2.28 CM/M2
ECHO RA MAJOR AXIS: 5.1 CM
ECHO RA MINOR AXIS INDEX: 1.7 CM/M2
ECHO RA MINOR AXIS: 3.8 CM
ECHO RIGHT VENTRICULAR SYSTOLIC PRESSURE (RVSP): 28 MMHG
ECHO RV BASAL DIMENSION: 3.8 CM
ECHO RV MID DIMENSION: 3.3 CM
ECHO RV TAPSE: 2.4 CM (ref 1.7–?)
ECHO TV REGURGITANT MAX VELOCITY: 2.15 M/S
ECHO TV REGURGITANT PEAK GRADIENT: 18 MMHG
EKG ATRIAL RATE: 53 BPM
EKG ATRIAL RATE: 70 BPM
EKG P AXIS: 45 DEGREES
EKG P AXIS: 47 DEGREES
EKG P-R INTERVAL: 166 MS
EKG P-R INTERVAL: 166 MS
EKG Q-T INTERVAL: 434 MS
EKG Q-T INTERVAL: 462 MS
EKG QRS DURATION: 142 MS
EKG QRS DURATION: 144 MS
EKG QTC CALCULATION (BAZETT): 433 MS
EKG QTC CALCULATION (BAZETT): 468 MS
EKG R AXIS: -23 DEGREES
EKG R AXIS: -29 DEGREES
EKG T AXIS: 161 DEGREES
EKG T AXIS: 164 DEGREES
EKG VENTRICULAR RATE: 53 BPM
EKG VENTRICULAR RATE: 70 BPM
GLUCOSE BLD-MCNC: 116 MG/DL (ref 75–110)
GLUCOSE BLD-MCNC: 143 MG/DL (ref 75–110)
TROPONIN I SERPL HS-MCNC: 19 NG/L (ref 0–22)

## 2024-04-22 PROCEDURE — 84484 ASSAY OF TROPONIN QUANT: CPT

## 2024-04-22 PROCEDURE — 82947 ASSAY GLUCOSE BLOOD QUANT: CPT

## 2024-04-22 PROCEDURE — 6370000000 HC RX 637 (ALT 250 FOR IP): Performed by: STUDENT IN AN ORGANIZED HEALTH CARE EDUCATION/TRAINING PROGRAM

## 2024-04-22 PROCEDURE — 6370000000 HC RX 637 (ALT 250 FOR IP)

## 2024-04-22 PROCEDURE — G0378 HOSPITAL OBSERVATION PER HR: HCPCS

## 2024-04-22 PROCEDURE — 6360000002 HC RX W HCPCS

## 2024-04-22 PROCEDURE — 93306 TTE W/DOPPLER COMPLETE: CPT | Performed by: INTERNAL MEDICINE

## 2024-04-22 PROCEDURE — 2580000003 HC RX 258

## 2024-04-22 PROCEDURE — 93306 TTE W/DOPPLER COMPLETE: CPT

## 2024-04-22 PROCEDURE — 93010 ELECTROCARDIOGRAM REPORT: CPT | Performed by: INTERNAL MEDICINE

## 2024-04-22 PROCEDURE — 36415 COLL VENOUS BLD VENIPUNCTURE: CPT

## 2024-04-22 PROCEDURE — 99222 1ST HOSP IP/OBS MODERATE 55: CPT | Performed by: INTERNAL MEDICINE

## 2024-04-22 PROCEDURE — 96372 THER/PROPH/DIAG INJ SC/IM: CPT

## 2024-04-22 PROCEDURE — 93005 ELECTROCARDIOGRAM TRACING: CPT | Performed by: EMERGENCY MEDICINE

## 2024-04-22 RX ORDER — ISOSORBIDE MONONITRATE 30 MG/1
30 TABLET, EXTENDED RELEASE ORAL DAILY
Qty: 30 TABLET | Refills: 0 | Status: SHIPPED | OUTPATIENT
Start: 2024-04-22

## 2024-04-22 RX ORDER — NITROGLYCERIN 0.4 MG/1
0.4 TABLET SUBLINGUAL EVERY 5 MIN PRN
Qty: 25 TABLET | Refills: 3 | Status: SHIPPED | OUTPATIENT
Start: 2024-04-22

## 2024-04-22 RX ORDER — METOPROLOL TARTRATE 50 MG/1
50 TABLET, FILM COATED ORAL ONCE
Status: COMPLETED | OUTPATIENT
Start: 2024-04-22 | End: 2024-04-22

## 2024-04-22 RX ADMIN — HYDRALAZINE HYDROCHLORIDE 50 MG: 50 TABLET, FILM COATED ORAL at 09:23

## 2024-04-22 RX ADMIN — OXYCODONE 5 MG: 5 TABLET ORAL at 08:51

## 2024-04-22 RX ADMIN — CHOLECALCIFEROL TAB 25 MCG (1000 UNIT) 5000 UNITS: 25 TAB at 09:23

## 2024-04-22 RX ADMIN — INSULIN GLARGINE 20 UNITS: 100 INJECTION, SOLUTION SUBCUTANEOUS at 09:38

## 2024-04-22 RX ADMIN — METOPROLOL TARTRATE 50 MG: 50 TABLET, FILM COATED ORAL at 11:57

## 2024-04-22 RX ADMIN — ENOXAPARIN SODIUM 40 MG: 100 INJECTION SUBCUTANEOUS at 09:23

## 2024-04-22 RX ADMIN — SODIUM CHLORIDE, PRESERVATIVE FREE 10 ML: 5 INJECTION INTRAVENOUS at 09:26

## 2024-04-22 RX ADMIN — SACUBITRIL AND VALSARTAN 1 TABLET: 24; 26 TABLET, FILM COATED ORAL at 09:24

## 2024-04-22 RX ADMIN — OXYCODONE 5 MG: 5 TABLET ORAL at 03:47

## 2024-04-22 RX ADMIN — FLUTICASONE PROPIONATE 1 SPRAY: 50 SPRAY, METERED NASAL at 09:24

## 2024-04-22 RX ADMIN — ACETAMINOPHEN 650 MG: 325 TABLET ORAL at 03:47

## 2024-04-22 RX ADMIN — INSULIN LISPRO 17 UNITS: 100 INJECTION, SOLUTION INTRAVENOUS; SUBCUTANEOUS at 09:38

## 2024-04-22 RX ADMIN — METOPROLOL SUCCINATE 50 MG: 25 TABLET, EXTENDED RELEASE ORAL at 09:24

## 2024-04-22 RX ADMIN — PANTOPRAZOLE SODIUM 40 MG: 40 TABLET, DELAYED RELEASE ORAL at 09:24

## 2024-04-22 RX ADMIN — ASPIRIN 81 MG: 81 TABLET, COATED ORAL at 09:24

## 2024-04-22 ASSESSMENT — PAIN SCALES - WONG BAKER

## 2024-04-22 ASSESSMENT — PAIN SCALES - GENERAL
PAINLEVEL_OUTOF10: 10
PAINLEVEL_OUTOF10: 0
PAINLEVEL_OUTOF10: 0
PAINLEVEL_OUTOF10: 5

## 2024-04-22 ASSESSMENT — PAIN DESCRIPTION - ORIENTATION: ORIENTATION: LEFT

## 2024-04-22 ASSESSMENT — PAIN - FUNCTIONAL ASSESSMENT: PAIN_FUNCTIONAL_ASSESSMENT: ACTIVITIES ARE NOT PREVENTED

## 2024-04-22 ASSESSMENT — PAIN DESCRIPTION - LOCATION
LOCATION: CHEST
LOCATION: CHEST;NECK;SHOULDER

## 2024-04-22 ASSESSMENT — PAIN DESCRIPTION - DESCRIPTORS
DESCRIPTORS: ACHING;DISCOMFORT
DESCRIPTORS: TIGHTNESS

## 2024-04-22 NOTE — PROGRESS NOTES
Observation resident on call notified via [employer] and updated that  patient complaints of chest pain, left neck pain and left shoulder pain, bp 170/68 p 62. night medications and pain meds given . 1 round of nitroglycerin given and now bp is 137/81, 63p, pulse ox stating at 95% on room air. Patient states pain has come down to 5 and tolerable now and doesn't want another nitroglycerin at this time. No new orders at this time.

## 2024-04-22 NOTE — H&P
of intravenous contrast. Multiplanar reformatted images are provided for review. MIP images are provided for review. Automated exposure control, iterative reconstruction, and/or weight based adjustment of the mA/kV was utilized to reduce the radiation dose to as low as reasonably achievable.; CTA of the chest was performed before and after the administration of intravenous contrast.  Multiplanar reformatted images are provided for review.  MIP images are provided for review. Automated exposure control, iterative reconstruction, and/or weight based adjustment of the mA/kV was utilized to reduce the radiation dose to as low as reasonably achievable. COMPARISON: Correlation is made to chest radiograph dated April 21, 2024.  CT abdomen/pelvis without contrast dated July 10, 2015.  No comparison for the chest portion of the exam. HISTORY: ORDERING SYSTEM PROVIDED HISTORY: r/o dissection TECHNOLOGIST PROVIDED HISTORY: r/o dissection Additional Contrast?->1 Reason for Exam: ripping chest pain, r/o dissection FINDINGS: CTA Chest: Aorta: On the noncontrast images, there is no evidence of thoracic aortic intramural hematoma.  There are calcified plaques in the thoracic aorta. Coronary artery atherosclerosis.  Following the administration of intravenous contrast, thoracic aorta opacifies normally with no evidence of intimal flap. Thoracic aortic caliber is within normal range. Mediastinum: Heart size is normal.  No pericardial effusion.  No evidence of hilar or mediastinal lymphadenopathy.  No central pulmonary embolus. Lungs: Dependent atelectasis.  There is mucus in the tracheal lumen.  The central tracheal bronchial airways are otherwise normal.  There is no bronchiectasis or bronchial wall thickening. There is no focal pulmonary consolidation, pneumothorax, or pleural effusion. Bones and soft tissues: No aggressive lytic or blastic bony lesion. CTA ABDOMEN: Abdominal aorta is of normal caliber and opacifies normally.

## 2024-04-22 NOTE — PLAN OF CARE
Problem: Chronic Conditions and Co-morbidities  Goal: Patient's chronic conditions and co-morbidity symptoms are monitored and maintained or improved  Outcome: Adequate for Discharge     Problem: Discharge Planning  Goal: Discharge to home or other facility with appropriate resources  Outcome: Adequate for Discharge     Problem: Pain  Goal: Verbalizes/displays adequate comfort level or baseline comfort level  Outcome: Adequate for Discharge     Problem: Safety - Adult  Goal: Free from fall injury  Outcome: Adequate for Discharge

## 2024-04-22 NOTE — CONSULTS
is moderate in size and is angiographically normal.   Right Coronary Artery: The vessel was visualized by angiography, is moderate in size and is angiographically normal.     Labs:     CBC:   Recent Labs     04/21/24  1346   WBC 7.8   HGB 16.5   HCT 47.8        BMP:   Recent Labs     04/21/24  1346   *   K 3.7   CO2 21   BUN 18   CREATININE 0.8   LABGLOM >90   GLUCOSE 137*       FASTING LIPID PANEL:  Lab Results   Component Value Date/Time    HDL 33 04/18/2022 11:32 AM    LDLCALC 164 11/19/2019 12:00 AM     LIVER PROFILE:  Recent Labs     04/21/24  1346   AST 36   ALT 13       IMPRESSION:    Patient Active Problem List   Diagnosis    Diabetes mellitus (HCC)    Hyperlipidemia    Hypertension    Lumbar radiculopathy    Obstructive sleep apnea syndrome    Restless legs syndrome (RLS)    Severe obesity (BMI 35.0-39.9) with comorbidity (HCC)    Numbness and tingling in both hands    Neuropathic pain of both legs    Gait difficulty    Diabetic polyneuropathy associated with type 2 diabetes mellitus (HCC)    Muscle spasms of both lower extremities    Ulnar neuropathy of right upper extremity    Right median nerve neuropathy    Mass of right elbow    Chest pain     Typical chest pain-concern for unstable angina   Non obstructive CAD on cardiac cath in 2020  HFmrEF with EF 40% on ECHO in 2021   Hypertension  Hyperlipidemia  Insulin-dependent type 2 diabetes mellitus  BRIANNE    RECOMMENDATIONS:  Considering the typical nature of patient's symptoms, risk factors and prior cardiac cath, recommend proceeding with coronary angiogram for further risk stratification, further recommendations to follow based on the same.  In the meantime, continue aspirin 81 mg daily, Lipitor 20 mg nightly.   Continue Toprol-XL 50 mg daily, Entresto 1 tablet 2 times daily, hydralazine 50 mg every 8.  If needed, for appropriate BP control, change Toprol-XL to Coreg 12.5 mg twice daily.  Patient is allergic to lisinopril.  Obtain 2D

## 2024-04-22 NOTE — DISCHARGE SUMMARY
Ref Range    WBC 7.8 3.5 - 11.3 k/uL    RBC 5.58 4.21 - 5.77 m/uL    Hemoglobin 16.5 13.0 - 17.0 g/dL    Hematocrit 47.8 40.7 - 50.3 %    MCV 85.7 82.6 - 102.9 fL    MCH 29.6 25.2 - 33.5 pg    MCHC 34.5 28.4 - 34.8 g/dL    RDW 12.6 11.8 - 14.4 %    Platelets 253 138 - 453 k/uL    MPV 10.5 8.1 - 13.5 fL    NRBC Automated 0.0 0.0 per 100 WBC    Neutrophils % 74 (H) 36 - 65 %    Lymphocytes % 16 (L) 24 - 43 %    Monocytes % 8 3 - 12 %    Eosinophils % 1 1 - 4 %    Basophils % 1 0 - 2 %    Immature Granulocytes % 0 0 %    Neutrophils Absolute 5.77 1.50 - 8.10 k/uL    Lymphocytes Absolute 1.22 1.10 - 3.70 k/uL    Monocytes Absolute 0.63 0.10 - 1.20 k/uL    Eosinophils Absolute 0.06 0.00 - 0.44 k/uL    Basophils Absolute 0.08 0.00 - 0.20 k/uL    Immature Granulocytes Absolute <0.03 0.00 - 0.30 k/uL   CMP   Result Value Ref Range    Sodium 135 (L) 136 - 145 mmol/L    Potassium 3.7 3.7 - 5.3 mmol/L    Chloride 101 98 - 107 mmol/L    CO2 21 20 - 31 mmol/L    Anion Gap 13 9 - 16 mmol/L    Glucose 137 (H) 74 - 99 mg/dL    BUN 18 6 - 20 mg/dL    Creatinine 0.8 0.70 - 1.20 mg/dL    Est, Glom Filt Rate >90 >60 mL/min/1.73m2    Calcium 9.5 8.6 - 10.4 mg/dL    Total Protein 7.4 6.6 - 8.7 g/dL    Albumin 4.6 3.5 - 5.2 g/dL    Albumin/Globulin Ratio 2.0 1.0 - 2.5    Total Bilirubin 0.5 0.00 - 1.20 mg/dL    Alkaline Phosphatase 90 40 - 129 U/L    ALT 13 10 - 50 U/L    AST 36 10 - 50 U/L   Magnesium   Result Value Ref Range    Magnesium 2.1 1.6 - 2.6 mg/dL   Troponin   Result Value Ref Range    Troponin, High Sensitivity 16 0 - 22 ng/L   Troponin   Result Value Ref Range    Troponin, High Sensitivity 19 0 - 22 ng/L   Brain Natriuretic Peptide   Result Value Ref Range    Pro- (H) 0 - 300 pg/mL   Troponin   Result Value Ref Range    Troponin, High Sensitivity 19 0 - 22 ng/L   POC Glucose Fingerstick   Result Value Ref Range    POC Glucose 129 (H) 75 - 110 mg/dL   POC Glucose Fingerstick   Result Value Ref Range    POC Glucose 122

## 2024-04-22 NOTE — PROGRESS NOTES
[facility]  CDU / OBSERVATION ENCOUNTER  ATTENDING NOTE       I performed a history and physical examination of the patient and discussed management with the resident or midlevel provider. I reviewed the resident or midlevel provider's note and agree with the documented findings and plan of care. Any areas of disagreement are noted on the chart. I was personally present for the key portions of any procedures. I have documented in the chart those procedures where I was not present during the key portions. I have reviewed the nurses notes. I agree with the chief complaint, past medical history, past surgical history, allergies, medications, social and family history as documented unless otherwise noted below.    The Family history, social history, and ROS are effectively unchanged since admission unless noted elsewhere in the chart.    This patient was placed in the observation unit for reevaluation for possible admission to the hospital    Patient with multiple points of frustration regarding his care.  Patient was frustrated that he was left in the hallway outside CAT scan awaiting his turn for scan.  Patient was also having difficulty with n.p.o. status initially in his stay.  Patient was felt to require further cardiac evaluation and was seen by cardiology.  Recommendations were for CT angiography.  Patient elected not to stay for further cardiac workup.      I discussed with the patient his options at some length.  Patient states he was unwilling to stay for further evaluation but would pursue outpatient workup.  Patient does have a primary care physician.  Patient was therefore had outpatient stress testing ordered with instructions and follow-up.  Patient is pain-free and symptom-free and understands need for further workup and reevaluation.    Nitesh Ovalles MD  Attending Emergency  Physician

## 2024-04-23 LAB
EKG ATRIAL RATE: 82 BPM
EKG P AXIS: 68 DEGREES
EKG P-R INTERVAL: 152 MS
EKG Q-T INTERVAL: 406 MS
EKG QRS DURATION: 146 MS
EKG QTC CALCULATION (BAZETT): 474 MS
EKG R AXIS: 28 DEGREES
EKG T AXIS: -165 DEGREES
EKG VENTRICULAR RATE: 82 BPM

## 2024-04-23 PROCEDURE — 93010 ELECTROCARDIOGRAM REPORT: CPT | Performed by: INTERNAL MEDICINE

## 2024-04-23 NOTE — CARE COORDINATION
Received call from [facility] scheduling, faxed OP stress order to [phone]  
discharge to: House  Plan for transportation at discharge: [facility]    Financial    Payor: [employer] MEDICARE / Plan: [employer] MEDICARE ADVANTAGE HMO / Product Type: Medicare /     Does insurance require precert for SNF: Yes    Potential assistance Purchasing Medications: No  Meds-to-Beds request:  yes      [facility] [place] - [address] - [place] [phone] - F [phone]  [address]  [place] [address]  Phone: [phone] Fax: [phone]      Notes:    Factors facilitating achievement of predicted outcomes: independent    Barriers to discharge: none    Additional Case Management Notes: home with s.o     The Plan for Transition of Care is related to the following treatment goals of Chest pain [R07.9]  Renal mass [N28.89]  Chest pain, unspecified type [R07.9]    IF APPLICABLE: The Patient and/or patient representative Davidson and his family were provided with a choice of provider and agrees with the discharge plan. Freedom of choice list with basic dialogue that supports the patient's individualized plan of care/goals and shares the quality data associated with the providers was provided to:     Patient     The Patient and/or Patient Representative Agree with the Discharge Plan?  yes    APOLLO WEAEVR RN  Case Management Department

## 2024-04-29 ENCOUNTER — HOSPITAL ENCOUNTER (OUTPATIENT)
Age: 54
Discharge: HOME | End: 2024-05-01
Attending: EMERGENCY MEDICINE
Payer: MEDICARE

## 2024-04-29 ENCOUNTER — HOSPITAL ENCOUNTER (OUTPATIENT)
Dept: NUCLEAR MEDICINE | Age: 54
Discharge: HOME | End: 2024-05-01
Attending: EMERGENCY MEDICINE
Payer: MEDICARE

## 2024-04-29 DIAGNOSIS — I20.9 ANGINA PECTORIS (HCC): ICD-10-CM

## 2024-04-29 LAB
STRESS BASELINE DIAS BP: 84 MMHG
STRESS BASELINE HR: 71 BPM
STRESS BASELINE SYS BP: 162 MMHG
STRESS ESTIMATED WORKLOAD: 1 METS
STRESS PEAK DIAS BP: 84 MMHG
STRESS PEAK SYS BP: 162 MMHG
STRESS PERCENT HR ACHIEVED: 58 %
STRESS POST PEAK HR: 96 BPM
STRESS RATE PRESSURE PRODUCT: NORMAL BPM*MMHG
STRESS TARGET HR: 165 BPM

## 2024-04-29 PROCEDURE — 2580000003 HC RX 258: Performed by: EMERGENCY MEDICINE

## 2024-04-29 PROCEDURE — 93018 CV STRESS TEST I&R ONLY: CPT | Performed by: INTERNAL MEDICINE

## 2024-04-29 PROCEDURE — A9500 TC99M SESTAMIBI: HCPCS | Performed by: EMERGENCY MEDICINE

## 2024-04-29 PROCEDURE — 93017 CV STRESS TEST TRACING ONLY: CPT

## 2024-04-29 PROCEDURE — 78452 HT MUSCLE IMAGE SPECT MULT: CPT

## 2024-04-29 PROCEDURE — 3430000000 HC RX DIAGNOSTIC RADIOPHARMACEUTICAL: Performed by: EMERGENCY MEDICINE

## 2024-04-29 PROCEDURE — 6360000002 HC RX W HCPCS: Performed by: EMERGENCY MEDICINE

## 2024-04-29 PROCEDURE — 93016 CV STRESS TEST SUPVJ ONLY: CPT | Performed by: INTERNAL MEDICINE

## 2024-04-29 RX ORDER — SODIUM CHLORIDE 0.9 % (FLUSH) 0.9 %
5-40 SYRINGE (ML) INJECTION PRN
Status: DISCONTINUED | OUTPATIENT
Start: 2024-04-29 | End: 2024-04-29

## 2024-04-29 RX ORDER — SODIUM CHLORIDE 9 MG/ML
500 INJECTION, SOLUTION INTRAVENOUS CONTINUOUS PRN
Status: DISCONTINUED | OUTPATIENT
Start: 2024-04-29 | End: 2024-04-29

## 2024-04-29 RX ORDER — AMINOPHYLLINE 25 MG/ML
50 INJECTION, SOLUTION INTRAVENOUS PRN
Status: DISCONTINUED | OUTPATIENT
Start: 2024-04-29 | End: 2024-04-29

## 2024-04-29 RX ORDER — REGADENOSON 0.08 MG/ML
0.4 INJECTION, SOLUTION INTRAVENOUS
Status: COMPLETED | OUTPATIENT
Start: 2024-04-29 | End: 2024-04-29

## 2024-04-29 RX ORDER — SODIUM CHLORIDE 0.9 % (FLUSH) 0.9 %
10 SYRINGE (ML) INJECTION PRN
Status: DISCONTINUED | OUTPATIENT
Start: 2024-04-29 | End: 2024-05-02 | Stop reason: HOSPADM

## 2024-04-29 RX ORDER — TETRAKIS(2-METHOXYISOBUTYLISOCYANIDE)COPPER(I) TETRAFLUOROBORATE 1 MG/ML
39 INJECTION, POWDER, LYOPHILIZED, FOR SOLUTION INTRAVENOUS
Status: COMPLETED | OUTPATIENT
Start: 2024-04-29 | End: 2024-04-29

## 2024-04-29 RX ORDER — ALBUTEROL SULFATE 90 UG/1
2 AEROSOL, METERED RESPIRATORY (INHALATION) PRN
Status: DISCONTINUED | OUTPATIENT
Start: 2024-04-29 | End: 2024-04-29

## 2024-04-29 RX ORDER — ATROPINE SULFATE 0.1 MG/ML
0.5 INJECTION INTRAVENOUS EVERY 5 MIN PRN
Status: DISCONTINUED | OUTPATIENT
Start: 2024-04-29 | End: 2024-04-29

## 2024-04-29 RX ORDER — TETRAKIS(2-METHOXYISOBUTYLISOCYANIDE)COPPER(I) TETRAFLUOROBORATE 1 MG/ML
16 INJECTION, POWDER, LYOPHILIZED, FOR SOLUTION INTRAVENOUS
Status: COMPLETED | OUTPATIENT
Start: 2024-04-29 | End: 2024-04-29

## 2024-04-29 RX ORDER — METOPROLOL TARTRATE 1 MG/ML
5 INJECTION, SOLUTION INTRAVENOUS EVERY 5 MIN PRN
Status: DISCONTINUED | OUTPATIENT
Start: 2024-04-29 | End: 2024-04-29

## 2024-04-29 RX ORDER — NITROGLYCERIN 0.4 MG/1
0.4 TABLET SUBLINGUAL EVERY 5 MIN PRN
Status: DISCONTINUED | OUTPATIENT
Start: 2024-04-29 | End: 2024-04-29

## 2024-04-29 RX ADMIN — TETRAKIS(2-METHOXYISOBUTYLISOCYANIDE)COPPER(I) TETRAFLUOROBORATE 39 MILLICURIE: 1 INJECTION, POWDER, LYOPHILIZED, FOR SOLUTION INTRAVENOUS at 10:05

## 2024-04-29 RX ADMIN — SODIUM CHLORIDE, PRESERVATIVE FREE 10 ML: 5 INJECTION INTRAVENOUS at 09:44

## 2024-04-29 RX ADMIN — TETRAKIS(2-METHOXYISOBUTYLISOCYANIDE)COPPER(I) TETRAFLUOROBORATE 16 MILLICURIE: 1 INJECTION, POWDER, LYOPHILIZED, FOR SOLUTION INTRAVENOUS at 08:00

## 2024-04-29 RX ADMIN — SODIUM CHLORIDE, PRESERVATIVE FREE 10 ML: 5 INJECTION INTRAVENOUS at 10:04

## 2024-04-29 RX ADMIN — REGADENOSON 0.4 MG: 0.08 INJECTION, SOLUTION INTRAVENOUS at 10:02

## 2024-04-29 RX ADMIN — SODIUM CHLORIDE, PRESERVATIVE FREE 10 ML: 5 INJECTION INTRAVENOUS at 10:05

## 2024-04-29 RX ADMIN — SODIUM CHLORIDE, PRESERVATIVE FREE 10 ML: 5 INJECTION INTRAVENOUS at 08:00

## 2024-07-17 ENCOUNTER — HOSPITAL ENCOUNTER (OUTPATIENT)
Age: 54
Setting detail: OUTPATIENT SURGERY
Discharge: HOME | End: 2024-07-17
Attending: UROLOGY | Admitting: UROLOGY
Payer: MEDICARE

## 2024-07-17 VITALS
RESPIRATION RATE: 18 BRPM | HEART RATE: 71 BPM | HEIGHT: 73 IN | TEMPERATURE: 97.3 F | OXYGEN SATURATION: 97 % | BODY MASS INDEX: 29.95 KG/M2 | WEIGHT: 226 LBS | DIASTOLIC BLOOD PRESSURE: 89 MMHG | SYSTOLIC BLOOD PRESSURE: 160 MMHG

## 2024-07-17 LAB — GLUCOSE BLD-MCNC: 117 MG/DL (ref 75–110)

## 2024-07-17 PROCEDURE — 3600000012 HC SURGERY LEVEL 2 ADDTL 15MIN: Performed by: UROLOGY

## 2024-07-17 PROCEDURE — 3600000002 HC SURGERY LEVEL 2 BASE: Performed by: UROLOGY

## 2024-07-17 PROCEDURE — 2580000003 HC RX 258: Performed by: ANESTHESIOLOGY

## 2024-07-17 PROCEDURE — 2709999900 HC NON-CHARGEABLE SUPPLY: Performed by: UROLOGY

## 2024-07-17 PROCEDURE — 82947 ASSAY GLUCOSE BLOOD QUANT: CPT

## 2024-07-17 PROCEDURE — 7100000010 HC PHASE II RECOVERY - FIRST 15 MIN: Performed by: UROLOGY

## 2024-07-17 PROCEDURE — 7100000011 HC PHASE II RECOVERY - ADDTL 15 MIN: Performed by: UROLOGY

## 2024-07-17 PROCEDURE — 6370000000 HC RX 637 (ALT 250 FOR IP): Performed by: UROLOGY

## 2024-07-17 RX ORDER — SODIUM CHLORIDE, SODIUM LACTATE, POTASSIUM CHLORIDE, CALCIUM CHLORIDE 600; 310; 30; 20 MG/100ML; MG/100ML; MG/100ML; MG/100ML
INJECTION, SOLUTION INTRAVENOUS CONTINUOUS
Status: DISCONTINUED | OUTPATIENT
Start: 2024-07-17 | End: 2024-07-17

## 2024-07-17 RX ORDER — PHENAZOPYRIDINE HYDROCHLORIDE 200 MG/1
200 TABLET, FILM COATED ORAL 3 TIMES DAILY PRN
Qty: 6 TABLET | Refills: 0 | Status: SHIPPED | OUTPATIENT
Start: 2024-07-17 | End: 2024-07-19

## 2024-07-17 RX ORDER — ALFUZOSIN HYDROCHLORIDE 10 MG/1
10 TABLET, EXTENDED RELEASE ORAL DAILY
Qty: 30 TABLET | Refills: 3 | Status: SHIPPED | OUTPATIENT
Start: 2024-07-17

## 2024-07-17 RX ORDER — LIDOCAINE HYDROCHLORIDE 20 MG/ML
JELLY TOPICAL PRN
Status: DISCONTINUED | OUTPATIENT
Start: 2024-07-17 | End: 2024-07-17 | Stop reason: HOSPADM

## 2024-07-17 RX ORDER — CIPROFLOXACIN 500 MG/1
500 TABLET, FILM COATED ORAL 2 TIMES DAILY
Qty: 6 TABLET | Refills: 0 | Status: SHIPPED | OUTPATIENT
Start: 2024-07-17 | End: 2024-07-20

## 2024-07-17 RX ORDER — CIPROFLOXACIN 2 MG/ML
400 INJECTION, SOLUTION INTRAVENOUS ONCE
Status: DISCONTINUED | OUTPATIENT
Start: 2024-07-17 | End: 2024-07-17

## 2024-07-17 RX ADMIN — SODIUM CHLORIDE, POTASSIUM CHLORIDE, SODIUM LACTATE AND CALCIUM CHLORIDE: 600; 310; 30; 20 INJECTION, SOLUTION INTRAVENOUS at 09:56

## 2024-07-17 ASSESSMENT — PAIN - FUNCTIONAL ASSESSMENT
PAIN_FUNCTIONAL_ASSESSMENT: 0-10
PAIN_FUNCTIONAL_ASSESSMENT: NONE - DENIES PAIN

## 2024-07-17 ASSESSMENT — PAIN DESCRIPTION - DESCRIPTORS: DESCRIPTORS: PRESSURE

## 2024-07-17 NOTE — H&P
SOLOSTAR 100 UNIT/ML injection pen INJECT 20 UNITS UNDER THE SKIN TWICE DAILY 10/14/21   Chelsi Rubio MD   ipratropium (ATROVENT) 0.06 % nasal spray USE 2 SPRAYS IN EACH NOSTRIL 4 TIMES DAILY 10/14/21   Miguel Angel Quiñones MD   carbidopa-levodopa (SINEMET CR)  MG per extended release tablet Take one tab at 8 pm + 11 pm 9/16/21   Shun Hogan MD   etodolac (LODINE) 400 MG tablet Take 1 tablet by mouth 2 times daily for 14 days 6/21/21 4/21/22  Shea Neumann MD   diclofenac (VOLTAREN) 75 MG EC tablet TAKE 1 TABLET BY MOUTH 2 TIMES DAILY (WITH MEALS) FOR 14 DAYS 6/14/21 9/15/21  Nuno Oliva PA   tiZANidine (ZANAFLEX) 4 MG tablet TAKE ONE TABLET BY MOUTH ONCE A DAY AT BEDTIME 5/14/21   Shun Hogan MD   omeprazole (PRILOSEC) 20 MG delayed release capsule TAKE ONE CAPSULE BY MOUTH ONCE A DAY BEFORE BREAKFAST 4/15/21   Kathryn Ponce APRN - CNP   carbidopa-levodopa (SINEMET)  MG per tablet TAKE 1 TABLET BY MOUTH AT 8PM AND 11PM 4/15/21   Shun Hogan MD   amitriptyline (ELAVIL) 25 MG tablet Take 1.5 tab nightly 4/15/21 4/21/24  Shun Hogan MD   atorvastatin (LIPITOR) 20 MG tablet TAKE ONE TABLET BY MOUTH ONCE A DAY AT BEDTIME 3/22/21   Kathryn Ponce APRN - CNP   clotrimazole-betamethasone (LOTRISONE) 1-0.05 % cream APPLY TOPICALLY TWICE A DAY 3/12/21   Dale Virk DPM   TRUEplus Lancets 30G MISC USE TO TEST BLOOD SUGAR 4 TIMES A DAY 1/19/21   Kathryn Ponce APRN - CNP   zinc oxide 20 % ointment MIX WITH KETOCONAZOLE 1:1 AND APPLY TO GROIN CREASES NIGHTLY 7/30/20   Dileep Carpenter MD   ketoconazole (NIZORAL) 2 % cream MIX WITH ZINC OXIDE 1:1 AND APPLY TO GROIN CREASES NIGHTLY 7/30/20   Dileep Carpenter MD   ASPIRIN LOW DOSE 81 MG EC tablet Take 1 tablet by mouth daily 6/19/20   Provider, MD Chelsi   latanoprost (XALATAN) 0.005 % ophthalmic solution Place 1 drop into both eyes daily  Patient not taking: Reported on 4/21/2024

## 2024-07-17 NOTE — PROGRESS NOTES
Patient requesting anesthesia today.  IV placed and Dr Almanzar in to evaluate patient.  Dr Almanzar would like patient to have cardiac clearance prior to receiving anesthesia.  He spoke with patient regarding this and patient stated he still wanted to have the procedure and will have the procedure under local.  IV removed.

## 2024-07-20 NOTE — OP NOTE
operative Note      Patient: Davidson Christiansen Jr.  YOB: 1970  MRN: [id number]    Date of Procedure: 7/17/2024    Pre-Op Diagnosis Codes:     * Benign prostatic hyperplasia, unspecified whether lower urinary tract symptoms present [N40.0]    Post-Op Diagnosis: Same and bladder outlet obstruction overactive bladder       Procedure(s):  CYSTOSCOPY, DILATION, UROFLO    Surgeon(s):  Arturo Warner MD    Assistant:   * No surgical staff found *    Anesthesia: Local    Estimated Blood Loss (mL): Minimal    Complications: None    Specimens:   * No specimens in log *    Implants:  * No implants in log *      Drains: * No LDAs found *    Findings:  Infection Present At Time Of Surgery (PATOS) (choose all levels that have infection present):  No infection present  Other Findings: Indications: This patient is 54 years old, he was scheduled for cystoscopy under monitored anesthesia but he did not obtain cardiac clearance, because of his cardiac the anesthesiology recommended that the procedure be done under local anesthesia or be rescheduled.    Discussed with the patient he elected to proceed with the evaluation.    He has a history of renal mass, complex renal cyst, the option of surgical management discussed with the patient's as well as the option of observation the patient has had an MRI of the kidneys as well as a renal ultrasound is being evaluated today for bladder outlet obstructive symptoms    Detailed Description of Procedure:   Patient was brought to the operating room, positioned in supine, proper patient identification procedure identification prepping and draping.    We entered the bladder with the flexible cystoscope, the patient has significant narrowing at the bulbous urethra, he has significant discomfort with the at the present instrument.    We used the the Laureano sounds, and we used lidocaine 2% gel, we gently dilated the bladder neck and prostate through size 20 [place] without  occasions.    Cystoscope was introduced again, bladder outlet obstructive uropathy documented    A uroflow study was performed    The bladder was filled up at 70 mL/min, the patient tolerated 320 mL in the bladder.    He had a first sensation at 100 mL, he had a strong urge at 250 mL.    The patient was then asked to void.    He voided with a decreased peak flow, less than 10 mL, he voided 320 mL.    Patient returned to recovery room in stable    Recommendations: At the present time we will have the patient on alpha-blocker Uroxatrol, we will also add antimuscarinic therapy such as trospium 20 mg for urgency and frequency and urge incontinence.    Follow-up visit at the office and measuring postvoid residual.    Meantime the workup for the renal mass previously mentioned    Electronically signed by Arturo Warner MD on 7/20/2024 at 2:25 PM

## 2024-08-28 RX ORDER — SODIUM CHLORIDE, SODIUM LACTATE, POTASSIUM CHLORIDE, CALCIUM CHLORIDE 600; 310; 30; 20 MG/100ML; MG/100ML; MG/100ML; MG/100ML
INJECTION, SOLUTION INTRAVENOUS CONTINUOUS
Status: CANCELLED | OUTPATIENT
Start: 2024-08-28

## 2024-09-04 ENCOUNTER — HOSPITAL ENCOUNTER (OUTPATIENT)
Dept: PREADMISSION TESTING | Age: 54
Discharge: HOME | End: 2024-09-04

## (undated) DEVICE — DRAPE,REIN 53X77,STERILE: Brand: MEDLINE

## (undated) DEVICE — MARKER,SKIN,WI/RULER AND LABELS: Brand: MEDLINE

## (undated) DEVICE — GARMENT,MEDLINE,DVT,INT,CALF,MED, GEN2: Brand: MEDLINE

## (undated) DEVICE — SUTURE NONABSORBABLE MONOFILAMENT 3-0 PS-1 18 IN BLK ETHILON 1663H

## (undated) DEVICE — NEEDLE FLTR 18GA L1.5IN MEM THK5UM BLNT DISP

## (undated) DEVICE — Device

## (undated) DEVICE — SUTURE VCRL SZ 3-0 L18IN ABSRB UD L26MM SH 1/2 CIR J864D

## (undated) DEVICE — NEEDLE SPNL 22GA L3.5IN BLK HUB S STL REG WALL FIT STYL W/

## (undated) DEVICE — APPLICATOR MEDICATED 26 CC SOLUTION HI LT ORNG CHLORAPREP

## (undated) DEVICE — DRESSING BORDERED ADH GZ UNIV GEN USE 5IN 4IN AND 2 1/2IN

## (undated) DEVICE — DRESSING BORDERED ADH GZ UNIV GEN USE 8INX4IN AND 6INX2IN

## (undated) DEVICE — SPONGE LAP W18XL18IN WHT COT 4 PLY FLD STRUNG RADPQ DISP ST

## (undated) DEVICE — SVMMC HND

## (undated) DEVICE — BANDAGE,GAUZE,BULKEE II,4.5"X4.1YD,STRL: Brand: MEDLINE

## (undated) DEVICE — GLOVE SURG SZ 7 CRM LTX FREE POLYISOPRENE POLYMER BEAD ANTI

## (undated) DEVICE — GLOVE ORANGE PI 7   MSG9070

## (undated) DEVICE — GLOVE ORANGE PI 7 1/2   MSG9075

## (undated) DEVICE — COVER,MAYO STAND,STERILE: Brand: MEDLINE

## (undated) DEVICE — TOWEL,OR,DSP,ST,NATURAL,DLX,4/PK,20PK/CS: Brand: MEDLINE

## (undated) DEVICE — CANNULA NSL AD L7FT O2 PRSS CRUSH RESIST TBNG M CONN AIRLFE

## (undated) DEVICE — E-Z CLEAN, NON-STICK, PTFE COATED, ELECTROSURGICAL BLADE ELECTRODE, MODIFIED EXTENDED INSULATION, 2.5 INCH (6.35 CM): Brand: MEGADYNE

## (undated) DEVICE — GAUZE,SPONGE,FLUFF,6"X6.75",STRL,5/TRAY: Brand: MEDLINE

## (undated) DEVICE — INTENDED FOR TISSUE SEPARATION, AND OTHER PROCEDURES THAT REQUIRE A SHARP SURGICAL BLADE TO PUNCTURE OR CUT.: Brand: BARD-PARKER ® CARBON RIB-BACK BLADES

## (undated) DEVICE — GLOVE ORANGE PI 8   MSG9080

## (undated) DEVICE — STAZ CYSTO: Brand: MEDLINE INDUSTRIES, INC.

## (undated) DEVICE — Z DISCONTINUED APPLICATOR SURG PREP 0.35OZ 2% CHG 70% ISO ALC W/ HI LT

## (undated) DEVICE — TUBING, SUCTION, 9/32" X 20', STRAIGHT: Brand: MEDLINE INDUSTRIES, INC.

## (undated) DEVICE — BNDG,ELSTC,MATRIX,STRL,4"X5YD,LF,HOOK&LP: Brand: MEDLINE

## (undated) DEVICE — GLOVE ORANGE PI 8 1/2   MSG9085

## (undated) DEVICE — BNDG,ELSTC,MATRIX,STRL,2"X5YD,LF,HOOK&LP: Brand: MEDLINE

## (undated) DEVICE — TOWEL,OR,DSP,ST,BLUE,STD,4/PK,20PK/CS: Brand: MEDLINE

## (undated) DEVICE — DISPOSABLE IRRIGATION BIPOLAR CORD, M1000 TYPE: Brand: KIRWAN

## (undated) DEVICE — BLADE ES ELASTOMERIC COAT INSUL DURABLE BEND UPTO 90DEG

## (undated) DEVICE — SOLUTION IRRIGATION STRL H2O 1000 ML UROMATIC CONTAINER

## (undated) DEVICE — GLOVE SURG SZ 8 L12IN FNGR THK79MIL GRN LTX FREE